# Patient Record
Sex: MALE | Race: WHITE | NOT HISPANIC OR LATINO | Employment: FULL TIME | URBAN - METROPOLITAN AREA
[De-identification: names, ages, dates, MRNs, and addresses within clinical notes are randomized per-mention and may not be internally consistent; named-entity substitution may affect disease eponyms.]

---

## 2021-03-29 ENCOUNTER — IMMUNIZATION (OUTPATIENT)
Dept: PRIMARY CARE CLINIC | Facility: CLINIC | Age: 51
End: 2021-03-29

## 2021-03-29 DIAGNOSIS — Z23 NEED FOR VACCINATION: Primary | ICD-10-CM

## 2021-03-29 PROCEDURE — 91301 PR SARS-COV-2 COVID-19 VACCINE, NO PRSV, 100MCG/0.5ML, IM: ICD-10-PCS | Mod: S$GLB,,, | Performed by: INTERNAL MEDICINE

## 2021-03-29 PROCEDURE — 91301 PR SARS-COV-2 COVID-19 VACCINE, NO PRSV, 100MCG/0.5ML, IM: CPT | Mod: S$GLB,,, | Performed by: INTERNAL MEDICINE

## 2021-03-29 PROCEDURE — 0011A PR IMMUNIZ ADMIN, SARS-COV-2 COVID-19 VACC, 100MCG/0.5ML, 1ST DOSE: CPT | Mod: CV19,S$GLB,, | Performed by: INTERNAL MEDICINE

## 2021-03-29 PROCEDURE — 0011A PR IMMUNIZ ADMIN, SARS-COV-2 COVID-19 VACC, 100MCG/0.5ML, 1ST DOSE: ICD-10-PCS | Mod: CV19,S$GLB,, | Performed by: INTERNAL MEDICINE

## 2021-03-29 RX ADMIN — Medication 0.5 ML: at 05:03

## 2021-04-28 ENCOUNTER — IMMUNIZATION (OUTPATIENT)
Dept: PRIMARY CARE CLINIC | Facility: CLINIC | Age: 51
End: 2021-04-28

## 2021-04-28 DIAGNOSIS — Z23 NEED FOR VACCINATION: Primary | ICD-10-CM

## 2021-04-28 PROCEDURE — 0012A PR IMMUNIZ ADMIN, SARS-COV-2 COVID-19 VACC, 100MCG/0.5ML, 2ND DOSE: ICD-10-PCS | Mod: CV19,S$GLB,, | Performed by: INTERNAL MEDICINE

## 2021-04-28 PROCEDURE — 91301 PR SARS-COV-2 COVID-19 VACCINE, NO PRSV, 100MCG/0.5ML, IM: CPT | Mod: S$GLB,,, | Performed by: INTERNAL MEDICINE

## 2021-04-28 PROCEDURE — 0012A PR IMMUNIZ ADMIN, SARS-COV-2 COVID-19 VACC, 100MCG/0.5ML, 2ND DOSE: CPT | Mod: CV19,S$GLB,, | Performed by: INTERNAL MEDICINE

## 2021-04-28 PROCEDURE — 91301 PR SARS-COV-2 COVID-19 VACCINE, NO PRSV, 100MCG/0.5ML, IM: ICD-10-PCS | Mod: S$GLB,,, | Performed by: INTERNAL MEDICINE

## 2021-04-28 RX ADMIN — Medication 0.5 ML: at 06:04

## 2022-02-28 ENCOUNTER — HOSPITAL ENCOUNTER (EMERGENCY)
Facility: HOSPITAL | Age: 52
Discharge: HOME | End: 2022-02-28
Attending: EMERGENCY MEDICINE
Payer: COMMERCIAL

## 2022-02-28 VITALS
TEMPERATURE: 98 F | RESPIRATION RATE: 18 BRPM | BODY MASS INDEX: 32.88 KG/M2 | HEIGHT: 76 IN | SYSTOLIC BLOOD PRESSURE: 140 MMHG | HEART RATE: 90 BPM | WEIGHT: 270 LBS | OXYGEN SATURATION: 98 % | DIASTOLIC BLOOD PRESSURE: 90 MMHG

## 2022-02-28 DIAGNOSIS — G89.29 CHRONIC LEFT-SIDED LOW BACK PAIN WITH LEFT-SIDED SCIATICA: Primary | ICD-10-CM

## 2022-02-28 DIAGNOSIS — M54.42 CHRONIC LEFT-SIDED LOW BACK PAIN WITH LEFT-SIDED SCIATICA: Primary | ICD-10-CM

## 2022-02-28 PROCEDURE — 25000003 PHARM REV CODE 250: Performed by: STUDENT IN AN ORGANIZED HEALTH CARE EDUCATION/TRAINING PROGRAM

## 2022-02-28 PROCEDURE — 99284 EMERGENCY DEPT VISIT MOD MDM: CPT | Mod: ,,, | Performed by: EMERGENCY MEDICINE

## 2022-02-28 PROCEDURE — 99283 EMERGENCY DEPT VISIT LOW MDM: CPT

## 2022-02-28 PROCEDURE — 99284 PR EMERGENCY DEPT VISIT,LEVEL IV: ICD-10-PCS | Mod: ,,, | Performed by: EMERGENCY MEDICINE

## 2022-02-28 RX ORDER — METHOCARBAMOL 500 MG/1
500 TABLET, FILM COATED ORAL
Status: COMPLETED | OUTPATIENT
Start: 2022-02-28 | End: 2022-02-28

## 2022-02-28 RX ORDER — MELOXICAM 7.5 MG/1
7.5 TABLET ORAL DAILY
Qty: 28 TABLET | Refills: 0 | Status: SHIPPED | OUTPATIENT
Start: 2022-02-28 | End: 2022-03-28

## 2022-02-28 RX ORDER — ACETAMINOPHEN 500 MG
1000 TABLET ORAL
Status: COMPLETED | OUTPATIENT
Start: 2022-02-28 | End: 2022-02-28

## 2022-02-28 RX ORDER — IBUPROFEN 400 MG/1
800 TABLET ORAL
Status: COMPLETED | OUTPATIENT
Start: 2022-02-28 | End: 2022-02-28

## 2022-02-28 RX ADMIN — ACETAMINOPHEN 1000 MG: 500 TABLET ORAL at 12:02

## 2022-02-28 RX ADMIN — IBUPROFEN 800 MG: 400 TABLET, FILM COATED ORAL at 12:02

## 2022-02-28 RX ADMIN — METHOCARBAMOL 500 MG: 500 TABLET ORAL at 12:02

## 2022-02-28 NOTE — ED NOTES
Patient identifiers verified and correct for this patient, ID bracelet placed on the L wrist.     Patient reports that he was at the chiropractor and stated that the pain was so bad that he could not do anything there so they called an ambulance to bring him here for the pain.  States that he is having pain from lower back around his buttocks, wrapping around and down the L leg.  States that he took tylenol for the pain at home.  Does report some numbness and tingling in the L leg.  Denies any falls.  Denies any loss of bowel or bladder control or difficulty with urination.     LOC: The patient is awake, alert and aware of environment with an appropriate affect, the patient is oriented x 3 and speaking appropriately.   APPEARANCE: Patient appears comfortable and in no acute distress, patient is clean and well groomed.  SKIN: The skin is warm and dry, color consistent with ethnicity, patient has normal skin turgor and moist mucus membranes, visible skin intact, no breakdown or bruising noted, denies any open wounds or sores.   MUSCULOSKELETAL: Patient moving all extremities spontaneously, no swelling noted, does report the pain in the L buttocks down the L leg as described above.  RESPIRATORY: Airway is open and patent, respirations are spontaneous, patient has a normal effort and rate, no accessory muscle use noted, denies any SOB.  CARDIAC: No edema noted, capillary refill < 3 seconds, denies any chest pain.   GASTRO: Soft and non tender to palpation, no distention noted, normoactive bowel sounds present in all four quadrants. Pt states bowel movements have been regular.  : Pt denies any pain or frequency with urination.  NEURO: Pt opens eyes spontaneously, behavior appropriate to situation, follows commands, facial expression symmetrical, purposeful motor response noted, normal sensation in all extremities when touched with a finger.    Awaiting MD evaluation and order placement.

## 2022-02-28 NOTE — ED PROVIDER NOTES
Encounter Date: 2/28/2022       History     Chief Complaint   Patient presents with    Back Pain     Arrived from chiropractor with L side sciatic nerve pain starting inlower back and buttock radiating down L leg     Patient is a 51-year-old male with no significant past medical history presenting to the emergency department for lower back pain x1 month.  He initially developed the pain when he had a stumble at work.  Did not fall, however he tested his body in a way that ended up causing a strain on his back; no actual/direct trauma to back. He has had associated although intermittent LLE shooting pains posteriorly with paresthesias.  He has not been taking any medications at home, however he did see a chiropractor for the pain.  States that it hurts him to walk, but is able to still do so.  He denies any midline back pain, urinary incontinence or saddle anesthesia.        Review of patient's allergies indicates:  No Known Allergies  History reviewed. No pertinent past medical history.  History reviewed. No pertinent surgical history.  No family history on file.     Review of Systems   Constitutional: Negative for fever.   HENT: Negative for sore throat.    Respiratory: Negative for shortness of breath.    Cardiovascular: Negative for chest pain.   Gastrointestinal: Negative for nausea.   Genitourinary: Negative for dysuria.   Musculoskeletal: Positive for back pain (lower; left sided with associated shooting pains down posterior L leg).   Skin: Negative for rash.   Neurological: Negative for weakness.   Hematological: Does not bruise/bleed easily.       Physical Exam     Initial Vitals [02/28/22 1106]   BP Pulse Resp Temp SpO2   (!) 140/90 90 18 98.1 °F (36.7 °C) 98 %      MAP       --         Physical Exam    Nursing note and vitals reviewed.  Constitutional: Vital signs are normal. He appears well-developed. He is not diaphoretic. No distress.   Overweight. Well-appearing.  Sitting in bed comfortably.  Speaking  full sentences.  No acute distress.   HENT:   Head: Normocephalic and atraumatic.   Right Ear: External ear normal.   Left Ear: External ear normal.   Neck: Neck supple.   Cardiovascular: Normal rate, regular rhythm, normal heart sounds and intact distal pulses.   Pulmonary/Chest: Breath sounds normal. No respiratory distress. He has no wheezes. He has no rhonchi. He has no rales.   Abdominal: Abdomen is soft. He exhibits no distension. There is no abdominal tenderness. There is no rebound and no guarding.   Musculoskeletal:      Cervical back: Neck supple.      Comments: There is no midline back pain. Pain is reproducible to L lumbar paraspinal musculature to the region of about L3-L4. Negative straight leg raise test.     Neurological: He is alert and oriented to person, place, and time. GCS score is 15. GCS eye subscore is 4. GCS verbal subscore is 5. GCS motor subscore is 6.   Strength and sensation intact to bilateral lower extremities.   Skin: Skin is warm. Capillary refill takes less than 2 seconds. No rash noted.   Psychiatric: He has a normal mood and affect.         ED Course   Procedures  Labs Reviewed - No data to display       Imaging Results    None          Medications   methocarbamoL tablet 500 mg (500 mg Oral Given 2/28/22 1201)   ibuprofen tablet 800 mg (800 mg Oral Given 2/28/22 1201)   acetaminophen tablet 1,000 mg (1,000 mg Oral Given 2/28/22 1201)     Medical Decision Making:   Initial Assessment:   Emergent evaluation of lower back pain that has been present x1 month.  He is afebrile and hemodynamically stable.  Differential Diagnosis:   Lumbar muscle strain, sciatica  ED Management:  Patient only has pain upon movement. Given ibuprofen, Tylenol and Robaxin.  Encouraged continued supportive care and management at home.  Discussed strict ED return precautions and back pain red flags, and patient expressed understanding.                      Clinical Impression:   Final diagnoses:  [M54.42,  G89.29] Chronic left-sided low back pain with left-sided sciatica (Primary)          ED Disposition Condition    Discharge Stable        ED Prescriptions     Medication Sig Dispense Start Date End Date Auth. Provider    meloxicam (MOBIC) 7.5 MG tablet Take 1 tablet (7.5 mg total) by mouth once daily. 28 tablet 2/28/2022 3/28/2022 Yared Duncan MD        Follow-up Information     Follow up With Specialties Details Why Contact Info    [facility] - Emergency Dept Emergency Medicine Go to  As needed, If symptoms worsen [address]  [facility] [id number] [phone]           Yared Duncan MD  Resident  02/28/22 9919

## 2022-02-28 NOTE — ED NOTES
Bed: [facility]  Expected date:   Expected time:   Means of arrival:   Comments:

## 2022-02-28 NOTE — DISCHARGE INSTRUCTIONS
Please return to the emergency department if you begin to experience symptoms including midline back pain, lower extremity weakness, inability to hold urine or numbness to the anal area

## 2022-02-28 NOTE — ED NOTES
Patient was able to get up off of the stretcher without any difficulty and with no assistance.  Stood in one attempt and stated that he felt much better.  He then ambulated out of the ED at this time without any difficulty accompanied by his mother.

## 2022-02-28 NOTE — ED NOTES
Patient medicated as ordered.  Currently without movement he denies any pain.  Visitor at bedside.  She had asked what the plan was for this patient and if we were going to do any imaging.  I had told her that they likely would not do imaging from the emergency department as the pain that he was describing was typically from sciatica.  Will continue to monitor.

## 2022-12-01 ENCOUNTER — HOSPITAL ENCOUNTER (EMERGENCY)
Facility: HOSPITAL | Age: 52
Discharge: HOME | End: 2022-12-01
Attending: EMERGENCY MEDICINE
Payer: COMMERCIAL

## 2022-12-01 VITALS
RESPIRATION RATE: 16 BRPM | TEMPERATURE: 98 F | HEART RATE: 80 BPM | OXYGEN SATURATION: 100 % | SYSTOLIC BLOOD PRESSURE: 139 MMHG | BODY MASS INDEX: 35.06 KG/M2 | WEIGHT: 288 LBS | DIASTOLIC BLOOD PRESSURE: 92 MMHG

## 2022-12-01 DIAGNOSIS — R03.0 ELEVATED BLOOD PRESSURE READING: ICD-10-CM

## 2022-12-01 DIAGNOSIS — R00.0 TACHYCARDIA: ICD-10-CM

## 2022-12-01 DIAGNOSIS — R31.9 HEMATURIA, UNSPECIFIED TYPE: ICD-10-CM

## 2022-12-01 DIAGNOSIS — M79.89 LEG SWELLING: ICD-10-CM

## 2022-12-01 DIAGNOSIS — E11.69 TYPE 2 DIABETES MELLITUS WITH OTHER SPECIFIED COMPLICATION, WITHOUT LONG-TERM CURRENT USE OF INSULIN: Primary | ICD-10-CM

## 2022-12-01 LAB
ALBUMIN SERPL BCP-MCNC: 4.2 G/DL (ref 3.5–5.2)
ALLENS TEST: ABNORMAL
ALP SERPL-CCNC: 101 U/L (ref 55–135)
ALT SERPL W/O P-5'-P-CCNC: 69 U/L (ref 10–44)
ANION GAP SERPL CALC-SCNC: 10 MMOL/L (ref 8–16)
AST SERPL-CCNC: 55 U/L (ref 10–40)
B-OH-BUTYR BLD STRIP-SCNC: 0.1 MMOL/L (ref 0–0.5)
BACTERIA #/AREA URNS AUTO: ABNORMAL /HPF
BASOPHILS # BLD AUTO: 0.05 K/UL (ref 0–0.2)
BASOPHILS NFR BLD: 0.7 % (ref 0–1.9)
BILIRUB SERPL-MCNC: 0.9 MG/DL (ref 0.1–1)
BILIRUB UR QL STRIP: NEGATIVE
BNP SERPL-MCNC: <10 PG/ML (ref 0–99)
BUN SERPL-MCNC: 10 MG/DL (ref 6–20)
CALCIUM SERPL-MCNC: 9.4 MG/DL (ref 8.7–10.5)
CHLORIDE SERPL-SCNC: 101 MMOL/L (ref 95–110)
CLARITY UR REFRACT.AUTO: CLEAR
CO2 SERPL-SCNC: 22 MMOL/L (ref 23–29)
COLOR UR AUTO: YELLOW
CREAT SERPL-MCNC: 1.1 MG/DL (ref 0.5–1.4)
DIFFERENTIAL METHOD: ABNORMAL
EOSINOPHIL # BLD AUTO: 0.1 K/UL (ref 0–0.5)
EOSINOPHIL NFR BLD: 1.5 % (ref 0–8)
ERYTHROCYTE [DISTWIDTH] IN BLOOD BY AUTOMATED COUNT: 12.7 % (ref 11.5–14.5)
EST. GFR  (NO RACE VARIABLE): >60 ML/MIN/1.73 M^2
GLUCOSE SERPL-MCNC: 402 MG/DL (ref 70–110)
GLUCOSE UR QL STRIP: ABNORMAL
HCO3 UR-SCNC: 25.4 MMOL/L (ref 24–28)
HCT VFR BLD AUTO: 45 % (ref 40–54)
HGB BLD-MCNC: 15.6 G/DL (ref 14–18)
HGB UR QL STRIP: ABNORMAL
IMM GRANULOCYTES # BLD AUTO: 0.07 K/UL (ref 0–0.04)
IMM GRANULOCYTES NFR BLD AUTO: 0.9 % (ref 0–0.5)
KETONES UR QL STRIP: NEGATIVE
LEUKOCYTE ESTERASE UR QL STRIP: NEGATIVE
LYMPHOCYTES # BLD AUTO: 2 K/UL (ref 1–4.8)
LYMPHOCYTES NFR BLD: 27.4 % (ref 18–48)
MCH RBC QN AUTO: 30.6 PG (ref 27–31)
MCHC RBC AUTO-ENTMCNC: 34.7 G/DL (ref 32–36)
MCV RBC AUTO: 88 FL (ref 82–98)
MICROSCOPIC COMMENT: ABNORMAL
MONOCYTES # BLD AUTO: 0.5 K/UL (ref 0.3–1)
MONOCYTES NFR BLD: 6.8 % (ref 4–15)
NEUTROPHILS # BLD AUTO: 4.7 K/UL (ref 1.8–7.7)
NEUTROPHILS NFR BLD: 62.7 % (ref 38–73)
NITRITE UR QL STRIP: NEGATIVE
NRBC BLD-RTO: 0 /100 WBC
PCO2 BLDA: 43.1 MMHG (ref 35–45)
PH SMN: 7.38 [PH] (ref 7.35–7.45)
PH UR STRIP: 5 [PH] (ref 5–8)
PLATELET # BLD AUTO: 151 K/UL (ref 150–450)
PMV BLD AUTO: 11.9 FL (ref 9.2–12.9)
PO2 BLDA: 65 MMHG (ref 40–60)
POC BE: 0 MMOL/L
POC SATURATED O2: 92 % (ref 95–100)
POC TCO2: 27 MMOL/L (ref 24–29)
POCT GLUCOSE: 313 MG/DL (ref 70–110)
POCT GLUCOSE: 359 MG/DL (ref 70–110)
POCT GLUCOSE: 404 MG/DL (ref 70–110)
POTASSIUM SERPL-SCNC: 4.1 MMOL/L (ref 3.5–5.1)
PROT SERPL-MCNC: 8.2 G/DL (ref 6–8.4)
PROT UR QL STRIP: NEGATIVE
RBC # BLD AUTO: 5.1 M/UL (ref 4.6–6.2)
RBC #/AREA URNS AUTO: 11 /HPF (ref 0–4)
SAMPLE: ABNORMAL
SITE: ABNORMAL
SODIUM SERPL-SCNC: 133 MMOL/L (ref 136–145)
SP GR UR STRIP: >1.03 (ref 1–1.03)
SQUAMOUS #/AREA URNS AUTO: 0 /HPF
URN SPEC COLLECT METH UR: ABNORMAL
WBC # BLD AUTO: 7.45 K/UL (ref 3.9–12.7)
WBC #/AREA URNS AUTO: 0 /HPF (ref 0–5)
YEAST UR QL AUTO: ABNORMAL

## 2022-12-01 PROCEDURE — 93005 ELECTROCARDIOGRAM TRACING: CPT

## 2022-12-01 PROCEDURE — 99284 PR EMERGENCY DEPT VISIT,LEVEL IV: ICD-10-PCS | Mod: ,,, | Performed by: PHYSICIAN ASSISTANT

## 2022-12-01 PROCEDURE — 80053 COMPREHEN METABOLIC PANEL: CPT | Performed by: PHYSICIAN ASSISTANT

## 2022-12-01 PROCEDURE — 83880 ASSAY OF NATRIURETIC PEPTIDE: CPT | Performed by: PHYSICIAN ASSISTANT

## 2022-12-01 PROCEDURE — 82962 GLUCOSE BLOOD TEST: CPT

## 2022-12-01 PROCEDURE — 99285 EMERGENCY DEPT VISIT HI MDM: CPT | Mod: 25

## 2022-12-01 PROCEDURE — 81001 URINALYSIS AUTO W/SCOPE: CPT | Performed by: PHYSICIAN ASSISTANT

## 2022-12-01 PROCEDURE — 93010 ELECTROCARDIOGRAM REPORT: CPT | Mod: ,,, | Performed by: INTERNAL MEDICINE

## 2022-12-01 PROCEDURE — 25000003 PHARM REV CODE 250: Performed by: PHYSICIAN ASSISTANT

## 2022-12-01 PROCEDURE — 99900035 HC TECH TIME PER 15 MIN (STAT)

## 2022-12-01 PROCEDURE — 82803 BLOOD GASES ANY COMBINATION: CPT

## 2022-12-01 PROCEDURE — 85025 COMPLETE CBC W/AUTO DIFF WBC: CPT | Performed by: PHYSICIAN ASSISTANT

## 2022-12-01 PROCEDURE — 96360 HYDRATION IV INFUSION INIT: CPT

## 2022-12-01 PROCEDURE — 82010 KETONE BODYS QUAN: CPT | Performed by: PHYSICIAN ASSISTANT

## 2022-12-01 PROCEDURE — 99284 EMERGENCY DEPT VISIT MOD MDM: CPT | Mod: ,,, | Performed by: PHYSICIAN ASSISTANT

## 2022-12-01 PROCEDURE — 93010 EKG 12-LEAD: ICD-10-PCS | Mod: ,,, | Performed by: INTERNAL MEDICINE

## 2022-12-01 RX ORDER — METFORMIN HYDROCHLORIDE 500 MG/1
500 TABLET ORAL
Qty: 30 TABLET | Refills: 0 | Status: SHIPPED | OUTPATIENT
Start: 2022-12-01 | End: 2022-12-27 | Stop reason: SDUPTHER

## 2022-12-01 RX ADMIN — SODIUM CHLORIDE 500 ML: 0.9 INJECTION, SOLUTION INTRAVENOUS at 07:12

## 2022-12-01 RX ADMIN — SODIUM CHLORIDE 500 ML: 9 INJECTION, SOLUTION INTRAVENOUS at 05:12

## 2022-12-02 NOTE — ED PROVIDER NOTES
"Encounter Date: 12/1/2022       History     Chief Complaint   Patient presents with    MD referral     Sent from [facility] for a "checkup" due to some concerns that came up when getting his new medical card.  "I feel fine"     Patient is a 52-year-old male with no significant medical history who presents to the emergency department after having a physical for work.  Patient reports he was having a work physical, when they told him his blood pressure and blood sugar were too high.  He denies any symptoms.  He states he does not see a primary care provider regularly.      The history is provided by the patient.   Review of patient's allergies indicates:  No Known Allergies  No past medical history on file.  No past surgical history on file.  No family history on file.     Review of Systems   Constitutional:  Negative for activity change, appetite change, chills, fatigue and fever.   HENT:  Negative for congestion, ear discharge, ear pain, postnasal drip, rhinorrhea, sore throat and trouble swallowing.    Respiratory:  Negative for cough and shortness of breath.    Cardiovascular:  Negative for chest pain.   Gastrointestinal:  Negative for abdominal pain, blood in stool, constipation, diarrhea, nausea and vomiting.   Genitourinary:  Negative for dysuria, flank pain and hematuria.   Musculoskeletal:  Negative for back pain, neck pain and neck stiffness.   Skin:  Negative for rash and wound.   Neurological:  Negative for dizziness, light-headedness and headaches.     Physical Exam     Initial Vitals [12/01/22 1602]   BP Pulse Resp Temp SpO2   (!) 168/72 100 20 98 °F (36.7 °C) 100 %      MAP       --         Physical Exam    Nursing note and vitals reviewed.  Constitutional: Vital signs are normal. He appears well-developed and well-nourished. He is not diaphoretic.  Non-toxic appearance. No distress.   HENT:   Head: Normocephalic.   Right Ear: External ear normal.   Left Ear: External ear normal.   Nose: Nose normal. "   Mouth/Throat: Oropharynx is clear and moist. No oropharyngeal exudate.   Eyes: Conjunctivae are normal. Pupils are equal, round, and reactive to light.   Neck:   Normal range of motion.  Cardiovascular:  Normal rate and regular rhythm.           Pulmonary/Chest: Breath sounds normal.   Abdominal: Abdomen is soft. Bowel sounds are normal. There is no abdominal tenderness.   Musculoskeletal:      Cervical back: Normal range of motion.     Neurological: He is alert and oriented to person, place, and time. He has normal strength. GCS score is 15. GCS eye subscore is 4. GCS verbal subscore is 5. GCS motor subscore is 6.   Skin: Skin is warm and dry. Capillary refill takes less than 2 seconds.   Psychiatric: He has a normal mood and affect.       ED Course   Procedures  Labs Reviewed   CBC W/ AUTO DIFFERENTIAL - Abnormal; Notable for the following components:       Result Value    Immature Granulocytes 0.9 (*)     Immature Grans (Abs) 0.07 (*)     All other components within normal limits   COMPREHENSIVE METABOLIC PANEL - Abnormal; Notable for the following components:    Sodium 133 (*)     CO2 22 (*)     Glucose 402 (*)     AST 55 (*)     ALT 69 (*)     All other components within normal limits   URINALYSIS, REFLEX TO URINE CULTURE - Abnormal; Notable for the following components:    Specific Gravity, UA >1.030 (*)     Glucose, UA 4+ (*)     Occult Blood UA Trace (*)     All other components within normal limits    Narrative:     Specimen Source->Urine   URINALYSIS MICROSCOPIC - Abnormal; Notable for the following components:    RBC, UA 11 (*)     All other components within normal limits    Narrative:     Specimen Source->Urine   POCT GLUCOSE - Abnormal; Notable for the following components:    POCT Glucose 404 (*)     All other components within normal limits   ISTAT PROCEDURE - Abnormal; Notable for the following components:    POC PO2 65 (*)     POC SATURATED O2 92 (*)     All other components within normal limits    POCT GLUCOSE - Abnormal; Notable for the following components:    POCT Glucose 359 (*)     All other components within normal limits   POCT GLUCOSE - Abnormal; Notable for the following components:    POCT Glucose 313 (*)     All other components within normal limits   B-TYPE NATRIURETIC PEPTIDE   BETA - HYDROXYBUTYRATE, SERUM     EKG Readings: (Independently Interpreted)   Initial Reading: No STEMI. Rhythm: Normal Sinus Rhythm.     Imaging Results              X-Ray Chest AP Portable (Final result)  Result time 12/01/22 18:28:05      Final result by Jackson Petit MD (12/01/22 18:28:05)                   Impression:      No acute process.      Electronically signed by: Jackson Petit MD  Date:    12/01/2022  Time:    18:28               Narrative:    EXAMINATION:  XR CHEST AP PORTABLE    CLINICAL HISTORY:  Other specified soft tissue disorders    TECHNIQUE:  Single frontal view of the chest was performed.    COMPARISON:  None    FINDINGS:  The trachea is unremarkable.  The cardiomediastinal silhouette is within normal limits.  The hilar structures are unremarkable.  There is no evidence of free air beneath the hemidiaphragms.  There are no pleural effusions.  There is no evidence of a pneumothorax.  There is no evidence of pneumomediastinum.  No airspace opacity is present.  The osseous structures are unremarkable.                                    X-Rays:   Independently Interpreted Readings:   Other Readings:  No acute cardiopulmonary process  Medications   sodium chloride 0.9% bolus 500 mL (0 mLs Intravenous Stopped 12/1/22 1800)   sodium chloride 0.9% bolus 500 mL (0 mLs Intravenous Stopped 12/1/22 2025)     Medical Decision Making:   Initial Assessment:   Urgent evaluation of a 52-year-old male with no significant medical history presents to the emergency department after an abnormal work physical.  Patient is afebrile, nontoxic appearing, and hemodynamically stable.  Patient is completely asymptomatic.   Patient's work forms show that his blood pressure was mildly elevated and has a glucose of greater than 500.  Extensive workup completed.  No significant kidney insufficiency or electrolyte disturbance.  No evidence to suggest DKA.  Patient is given IV fluid.  Glucose is trending downward.  He will be started on metformin.  Referral for Internal Medicine and diabetic training is placed.  Patient also has painless hematuria.  Urology referral is placed.  Patient is counseled on all findings.  He is advised to follow up with all physicians outpatient.  He is given strict return precautions.                        Clinical Impression:   Final diagnoses:  [R00.0] Tachycardia  [M79.89] Leg swelling  [E11.69] Type 2 diabetes mellitus with other specified complication, without long-term current use of insulin (Primary)  [R03.0] Elevated blood pressure reading  [R31.9] Hematuria, unspecified type        ED Disposition Condition    Discharge Stable          ED Prescriptions       Medication Sig Dispense Start Date End Date Auth. Provider    metFORMIN (GLUCOPHAGE) 500 MG tablet Take 1 tablet (500 mg total) by mouth daily with breakfast. 30 tablet 12/1/2022 12/1/2023 Kristie Crump PA-C          Follow-up Information       Follow up With Specialties Details Why Contact Info Additional Information    [facility]   [address]  [facility] [address] [phone] [facility] Please park in surface lot and check in at central registration desk    [facility] - [facility]rology [facility] Urology   [address]  [facility] [id number] [phone] Main Building, 4th Floor Please park in [facility] and take [facility] elevator             Kristie Crump PA-C  12/02/22 0048

## 2022-12-08 ENCOUNTER — LAB VISIT (OUTPATIENT)
Dept: LAB | Facility: HOSPITAL | Age: 52
End: 2022-12-08
Payer: COMMERCIAL

## 2022-12-08 ENCOUNTER — OFFICE VISIT (OUTPATIENT)
Dept: INTERNAL MEDICINE | Facility: CLINIC | Age: 52
End: 2022-12-08
Payer: COMMERCIAL

## 2022-12-08 VITALS
HEIGHT: 76 IN | OXYGEN SATURATION: 98 % | WEIGHT: 282.19 LBS | BODY MASS INDEX: 34.36 KG/M2 | HEART RATE: 82 BPM | DIASTOLIC BLOOD PRESSURE: 93 MMHG | SYSTOLIC BLOOD PRESSURE: 138 MMHG

## 2022-12-08 DIAGNOSIS — R03.0 ELEVATED BLOOD PRESSURE READING: ICD-10-CM

## 2022-12-08 DIAGNOSIS — Z00.00 ROUTINE HEALTH MAINTENANCE: ICD-10-CM

## 2022-12-08 DIAGNOSIS — E11.69 TYPE 2 DIABETES MELLITUS WITH OTHER SPECIFIED COMPLICATION, WITHOUT LONG-TERM CURRENT USE OF INSULIN: ICD-10-CM

## 2022-12-08 DIAGNOSIS — Z00.00 ROUTINE HEALTH MAINTENANCE: Primary | ICD-10-CM

## 2022-12-08 LAB
ALBUMIN SERPL BCP-MCNC: 3.9 G/DL (ref 3.5–5.2)
ALP SERPL-CCNC: 77 U/L (ref 55–135)
ALT SERPL W/O P-5'-P-CCNC: 56 U/L (ref 10–44)
ANION GAP SERPL CALC-SCNC: 8 MMOL/L (ref 8–16)
AST SERPL-CCNC: 53 U/L (ref 10–40)
BILIRUB SERPL-MCNC: 0.6 MG/DL (ref 0.1–1)
BILIRUB UR QL STRIP: NEGATIVE
BUN SERPL-MCNC: 8 MG/DL (ref 6–20)
CALCIUM SERPL-MCNC: 9.3 MG/DL (ref 8.7–10.5)
CHLORIDE SERPL-SCNC: 106 MMOL/L (ref 95–110)
CHOLEST SERPL-MCNC: 189 MG/DL (ref 120–199)
CHOLEST/HDLC SERPL: 5.3 {RATIO} (ref 2–5)
CLARITY UR REFRACT.AUTO: CLEAR
CO2 SERPL-SCNC: 24 MMOL/L (ref 23–29)
COLOR UR AUTO: YELLOW
CREAT SERPL-MCNC: 0.8 MG/DL (ref 0.5–1.4)
EST. GFR  (NO RACE VARIABLE): >60 ML/MIN/1.73 M^2
ESTIMATED AVG GLUCOSE: 306 MG/DL (ref 68–131)
GLUCOSE SERPL-MCNC: 143 MG/DL (ref 70–110)
GLUCOSE UR QL STRIP: ABNORMAL
HBA1C MFR BLD: 12.3 % (ref 4–5.6)
HDLC SERPL-MCNC: 36 MG/DL (ref 40–75)
HDLC SERPL: 19 % (ref 20–50)
HGB UR QL STRIP: NEGATIVE
KETONES UR QL STRIP: NEGATIVE
LDLC SERPL CALC-MCNC: 108.8 MG/DL (ref 63–159)
LEUKOCYTE ESTERASE UR QL STRIP: NEGATIVE
NITRITE UR QL STRIP: NEGATIVE
NONHDLC SERPL-MCNC: 153 MG/DL
PH UR STRIP: 5 [PH] (ref 5–8)
POTASSIUM SERPL-SCNC: 3.9 MMOL/L (ref 3.5–5.1)
PROT SERPL-MCNC: 7.4 G/DL (ref 6–8.4)
PROT UR QL STRIP: NEGATIVE
SODIUM SERPL-SCNC: 138 MMOL/L (ref 136–145)
SP GR UR STRIP: 1.02 (ref 1–1.03)
TRIGL SERPL-MCNC: 221 MG/DL (ref 30–150)
URN SPEC COLLECT METH UR: ABNORMAL

## 2022-12-08 PROCEDURE — 99396 PREV VISIT EST AGE 40-64: CPT | Mod: S$GLB,,,

## 2022-12-08 PROCEDURE — 3075F SYST BP GE 130 - 139MM HG: CPT | Mod: CPTII,S$GLB,,

## 2022-12-08 PROCEDURE — 36415 COLL VENOUS BLD VENIPUNCTURE: CPT

## 2022-12-08 PROCEDURE — 3080F PR MOST RECENT DIASTOLIC BLOOD PRESSURE >= 90 MM HG: ICD-10-PCS | Mod: CPTII,S$GLB,,

## 2022-12-08 PROCEDURE — 3008F PR BODY MASS INDEX (BMI) DOCUMENTED: ICD-10-PCS | Mod: CPTII,S$GLB,,

## 2022-12-08 PROCEDURE — 83036 HEMOGLOBIN GLYCOSYLATED A1C: CPT

## 2022-12-08 PROCEDURE — 3008F BODY MASS INDEX DOCD: CPT | Mod: CPTII,S$GLB,,

## 2022-12-08 PROCEDURE — 3046F HEMOGLOBIN A1C LEVEL >9.0%: CPT | Mod: CPTII,S$GLB,,

## 2022-12-08 PROCEDURE — 81003 URINALYSIS AUTO W/O SCOPE: CPT

## 2022-12-08 PROCEDURE — 3075F PR MOST RECENT SYSTOLIC BLOOD PRESS GE 130-139MM HG: ICD-10-PCS | Mod: CPTII,S$GLB,,

## 2022-12-08 PROCEDURE — 99999 PR PBB SHADOW E&M-EST. PATIENT-LVL IV: ICD-10-PCS | Mod: PBBFAC,,,

## 2022-12-08 PROCEDURE — 3046F PR MOST RECENT HEMOGLOBIN A1C LEVEL > 9.0%: ICD-10-PCS | Mod: CPTII,S$GLB,,

## 2022-12-08 PROCEDURE — 80053 COMPREHEN METABOLIC PANEL: CPT

## 2022-12-08 PROCEDURE — 80061 LIPID PANEL: CPT

## 2022-12-08 PROCEDURE — 3080F DIAST BP >= 90 MM HG: CPT | Mod: CPTII,S$GLB,,

## 2022-12-08 PROCEDURE — 99999 PR PBB SHADOW E&M-EST. PATIENT-LVL IV: CPT | Mod: PBBFAC,,,

## 2022-12-08 PROCEDURE — 99396 PR PREVENTIVE VISIT,EST,40-64: ICD-10-PCS | Mod: S$GLB,,,

## 2022-12-08 NOTE — PATIENT INSTRUCTIONS
Please obtain labs at your earliest convenience and continue to make lifestyle modifications.     Please follow up with your nutritionist and urologist

## 2022-12-08 NOTE — PROGRESS NOTES
Shlomo Wallace  1970        Subjective     Reason for Visit:    History of Present Illness:  Mr. Shlomo Wallace is a 52 y.o. male who presents to clinic for establishing of care.   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 In the ED visit, BG was 502. UA with trace hematuria. ED recommended starting on metformin, follow up with nutrition and urology.     His main concern at this visit is taking back control of his health and gettinghis diabetes under control.   ADLs: no limitations, 100% independent  Memory: No concerns  Mental health: PHQ-0   Safety: Lives at home with mother, no concerns for safety  Gait: wnl, no falls  Occupation: , odd hours   Diet: A lot of fried foods, gatorode, sweet foods, nutrition, motivat  Exercise: hard to do with work   Tobacco; no smoking  Alcohol; bud light social drinking, seldom (~1 every 1-2 mo)   Illicit Drug use: Denies   Sexual History: No partners in the last year,     HEALTH MAINTENANCE:  Colonoscopy; Has not had a colonoscopy yet  VACCINATIONS:    Flu; received flu shot  COVID: 5 shots     Review of Systems   Constitutional:  Negative for chills, fever, malaise/fatigue and weight loss.   HENT:  Negative for hearing loss and sore throat.    Eyes:  Negative for blurred vision.   Respiratory:  Negative for cough and wheezing.    Cardiovascular:  Negative for chest pain, palpitations, orthopnea and leg swelling.   Gastrointestinal:  Negative for abdominal pain, blood in stool, constipation, diarrhea, nausea and vomiting.   Genitourinary:  Negative for dysuria.   Musculoskeletal:  Negative for myalgias.   Neurological:  Negative for dizziness, tingling, weakness and headaches.   Endo/Heme/Allergies:  Does not bruise/bleed easily.   Psychiatric/Behavioral:  The patient is not nervous/anxious.       PAST HISTORY:     No past medical history on file.    No past surgical history on  "file.    No family history on file.    Social History     Socioeconomic History    Marital status: Single   Tobacco Use    Smoking status: Never    Smokeless tobacco: Never       MEDICATIONS & ALLERGIES:     Current Outpatient Medications on File Prior to Visit   Medication Sig    metFORMIN (GLUCOPHAGE) 500 MG tablet Take 1 tablet (500 mg total) by mouth daily with breakfast.     No current facility-administered medications on file prior to visit.       Review of patient's allergies indicates:  No Known Allergies    OBJECTIVE:        Vital Signs:  Vitals:    12/08/22 1522   BP: (!) 138/93   BP Location: Left arm   Patient Position: Sitting   BP Method: Large (Automatic)   Pulse: 82   SpO2: 98%   Weight: 128 kg (282 lb 3 oz)   Height: 6' 4" (1.93 m)       Body mass index is 34.35 kg/m².     Physical Exam:  Physical Exam  Vitals and nursing note reviewed.      Gen: AxOx3, well nourished, appears stated age, no pallor, no jaundice, appears well hydrated  Eye: EOMI, no scleral icterus, no periorbital edema or ecchymosis  Head: normocephalic, atraumatic, no lesions, scalp appears normal  ENT: neck supple, no stridor, no masses, no drooling or voice changes  CVS: All distal pulses intact with normal rate and rhythm, no JVD, normal S1/S2, no murmur  Pulm: Normal breath sounds, no wheezes, rales or rhonchi, no increased work of breathing  Abd: soft, nontender, nondistended, no organomegaly, no CVAT  Ext: no edema, no lesions, rashes, or deformity  Neuro: GCS15, moving all extremities, gait intact, face grossly symmetric  Psych: normal affect, cooperative, well groomed, makes good eye contact      Laboratory  Lab Results   Component Value Date    WBC 7.45 12/01/2022    HGB 15.6 12/01/2022    HCT 45.0 12/01/2022    MCV 88 12/01/2022     12/01/2022     Lab Results   Component Value Date     (H) 12/08/2022     12/08/2022    K 3.9 12/08/2022     12/08/2022    CO2 24 12/08/2022    BUN 8 12/08/2022    " CREATININE 0.8 12/08/2022    CALCIUM 9.3 12/08/2022    PROT 7.4 12/08/2022    BILITOT 0.6 12/08/2022    ALKPHOS 77 12/08/2022    ALT 56 (H) 12/08/2022    AST 53 (H) 12/08/2022     No results found for: INR, PROTIME  Lab Results   Component Value Date    CHOL 189 12/08/2022    HDL 36 (L) 12/08/2022    LDLCALC 108.8 12/08/2022    TRIG 221 (H) 12/08/2022     Lab Results   Component Value Date    HGBA1C 12.3 (H) 12/08/2022     No results found for: TSH  No results for input(s): POCTGLUCOSE in the last 72 hours.       Health Maintenance         Date Due Completion Date    Hepatitis C Screening Never done ---    Lipid Panel Never done ---    HIV Screening Never done ---    TETANUS VACCINE Never done ---    Colorectal Cancer Screening Never done ---    COVID-19 Vaccine (3 - Booster for Moderna series) 06/23/2021 4/28/2021                ASSESSMENT & PLAN:       Mr. Shlomo Wallace is a 52 y.o. male who was seen today in clinic for establishing of care after ED visit found BG of 402. He will follow up with me within 4 weeks.     Shlomo was seen today for diabetes.    Diagnoses and all orders for this visit:    Routine health maintenance  -     COMPREHENSIVE METABOLIC PANEL; Future  -     LIPID PANEL; Future  -     Urinalysis, Reflex to Urine Culture Urine, Clean Catch  -     Ambulatory referral/consult to Endo Procedure ; Future    Type 2 diabetes mellitus with other specified complication, without long-term current use of insulin  -     Ambulatory referral/consult to Internal Medicine  -     HEMOGLOBIN A1C; Future  -     Microalbumin/Creatinine Ratio, Urine; Future    Elevated blood pressure reading  -     Ambulatory referral/consult to Internal Medicine       1. Routine health maintenance    2. Type 2 diabetes mellitus with other specified complication, without long-term current use of insulin    3. Elevated blood pressure reading        Follow up in about 1 month (around 1/8/2023).    Other Orders Placed  This Visit:  Orders Placed This Encounter   Procedures    COMPREHENSIVE METABOLIC PANEL    HEMOGLOBIN A1C    LIPID PANEL    Urinalysis, Reflex to Urine Culture Urine, Clean Catch    Microalbumin/Creatinine Ratio, Urine    Ambulatory referral/consult to Endo Procedure                Discussed with Dr. Fajardo - staff attestation to follow    Cindy Castillo MD  Internal Medicine, PGY-1  12/09/2022.3:31 PM  [facility]  Internal Medicine Resident Clinic  [address] [place] [id number]  [phone]  [email or web address]

## 2022-12-09 NOTE — PROGRESS NOTES
Would usually recommend insulin with this A1c however his glucose on cmp was much better. Would do only small amount of basal if he is agreeable.

## 2022-12-12 ENCOUNTER — OFFICE VISIT (OUTPATIENT)
Dept: UROLOGY | Facility: CLINIC | Age: 52
End: 2022-12-12
Payer: COMMERCIAL

## 2022-12-12 ENCOUNTER — TELEPHONE (OUTPATIENT)
Dept: INTERNAL MEDICINE | Facility: CLINIC | Age: 52
End: 2022-12-12
Payer: COMMERCIAL

## 2022-12-12 ENCOUNTER — PATIENT MESSAGE (OUTPATIENT)
Dept: INTERNAL MEDICINE | Facility: CLINIC | Age: 52
End: 2022-12-12
Payer: COMMERCIAL

## 2022-12-12 ENCOUNTER — LAB VISIT (OUTPATIENT)
Dept: LAB | Facility: HOSPITAL | Age: 52
End: 2022-12-12
Payer: COMMERCIAL

## 2022-12-12 VITALS
HEART RATE: 87 BPM | DIASTOLIC BLOOD PRESSURE: 87 MMHG | HEIGHT: 76 IN | BODY MASS INDEX: 34.35 KG/M2 | SYSTOLIC BLOOD PRESSURE: 138 MMHG

## 2022-12-12 DIAGNOSIS — Z12.5 PROSTATE CANCER SCREENING: Primary | ICD-10-CM

## 2022-12-12 DIAGNOSIS — R31.9 HEMATURIA, UNSPECIFIED TYPE: ICD-10-CM

## 2022-12-12 DIAGNOSIS — E11.9 TYPE 2 DIABETES MELLITUS WITHOUT COMPLICATION, WITHOUT LONG-TERM CURRENT USE OF INSULIN: Primary | ICD-10-CM

## 2022-12-12 DIAGNOSIS — Z12.5 PROSTATE CANCER SCREENING: ICD-10-CM

## 2022-12-12 LAB
BILIRUB SERPL-MCNC: NORMAL MG/DL
BLOOD URINE, POC: NORMAL
CLARITY, POC UA: CLEAR
COLOR, POC UA: YELLOW
COMPLEXED PSA SERPL-MCNC: 0.25 NG/ML (ref 0–4)
GLUCOSE UR QL STRIP: NORMAL
KETONES UR QL STRIP: NORMAL
LEUKOCYTE ESTERASE URINE, POC: NORMAL
NITRITE, POC UA: NORMAL
PH, POC UA: 5
PROTEIN, POC: NORMAL
SPECIFIC GRAVITY, POC UA: 1.02
UROBILINOGEN, POC UA: NORMAL

## 2022-12-12 PROCEDURE — 88112 CYTOPATH CELL ENHANCE TECH: CPT | Mod: 26,,, | Performed by: PATHOLOGY

## 2022-12-12 PROCEDURE — 99204 PR OFFICE/OUTPT VISIT, NEW, LEVL IV, 45-59 MIN: ICD-10-PCS | Mod: S$GLB,,,

## 2022-12-12 PROCEDURE — 81002 POCT URINE DIPSTICK WITHOUT MICROSCOPE: ICD-10-PCS | Mod: S$GLB,,,

## 2022-12-12 PROCEDURE — 3075F SYST BP GE 130 - 139MM HG: CPT | Mod: CPTII,S$GLB,,

## 2022-12-12 PROCEDURE — 1160F RVW MEDS BY RX/DR IN RCRD: CPT | Mod: CPTII,S$GLB,,

## 2022-12-12 PROCEDURE — 1160F PR REVIEW ALL MEDS BY PRESCRIBER/CLIN PHARMACIST DOCUMENTED: ICD-10-PCS | Mod: CPTII,S$GLB,,

## 2022-12-12 PROCEDURE — 36415 COLL VENOUS BLD VENIPUNCTURE: CPT

## 2022-12-12 PROCEDURE — 3008F PR BODY MASS INDEX (BMI) DOCUMENTED: ICD-10-PCS | Mod: CPTII,S$GLB,,

## 2022-12-12 PROCEDURE — 88112 PR  CYTOPATH, CELL ENHANCE TECH: ICD-10-PCS | Mod: 26,,, | Performed by: PATHOLOGY

## 2022-12-12 PROCEDURE — 3008F BODY MASS INDEX DOCD: CPT | Mod: CPTII,S$GLB,,

## 2022-12-12 PROCEDURE — 99999 PR PBB SHADOW E&M-EST. PATIENT-LVL III: CPT | Mod: PBBFAC,,,

## 2022-12-12 PROCEDURE — 81001 URINALYSIS AUTO W/SCOPE: CPT

## 2022-12-12 PROCEDURE — 88112 CYTOPATH CELL ENHANCE TECH: CPT | Performed by: PATHOLOGY

## 2022-12-12 PROCEDURE — 84153 ASSAY OF PSA TOTAL: CPT

## 2022-12-12 PROCEDURE — 3079F DIAST BP 80-89 MM HG: CPT | Mod: CPTII,S$GLB,,

## 2022-12-12 PROCEDURE — 87086 URINE CULTURE/COLONY COUNT: CPT

## 2022-12-12 PROCEDURE — 3075F PR MOST RECENT SYSTOLIC BLOOD PRESS GE 130-139MM HG: ICD-10-PCS | Mod: CPTII,S$GLB,,

## 2022-12-12 PROCEDURE — 3046F PR MOST RECENT HEMOGLOBIN A1C LEVEL > 9.0%: ICD-10-PCS | Mod: CPTII,S$GLB,,

## 2022-12-12 PROCEDURE — 3046F HEMOGLOBIN A1C LEVEL >9.0%: CPT | Mod: CPTII,S$GLB,,

## 2022-12-12 PROCEDURE — 3079F PR MOST RECENT DIASTOLIC BLOOD PRESSURE 80-89 MM HG: ICD-10-PCS | Mod: CPTII,S$GLB,,

## 2022-12-12 PROCEDURE — 99204 OFFICE O/P NEW MOD 45 MIN: CPT | Mod: S$GLB,,,

## 2022-12-12 PROCEDURE — 1159F PR MEDICATION LIST DOCUMENTED IN MEDICAL RECORD: ICD-10-PCS | Mod: CPTII,S$GLB,,

## 2022-12-12 PROCEDURE — 81002 URINALYSIS NONAUTO W/O SCOPE: CPT | Mod: S$GLB,,,

## 2022-12-12 PROCEDURE — 99999 PR PBB SHADOW E&M-EST. PATIENT-LVL III: ICD-10-PCS | Mod: PBBFAC,,,

## 2022-12-12 PROCEDURE — 1159F MED LIST DOCD IN RCRD: CPT | Mod: CPTII,S$GLB,,

## 2022-12-12 NOTE — PROGRESS NOTES
CHIEF COMPLAINT:  Microscopic hematuria     HISTORY OF PRESENTING ILLINESS:  Shlomo Wallace is a 52 y.o. male new to urology. He is here today as a referral from JULIA Crump PA-C for microscopic hematuria. He had a microscopic urinalysis performed 12/1/22 11 rbc/hpf. He was seen for health physical through work - had high blood pressure and high blood sugar during exam and was sent to the ED for evaluation.     No family history of bladder, kidney, or bladder cancer.     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   HENT:  Negative for congestion and sore throat.    Respiratory:  Negative for cough, shortness of breath and wheezing.    Cardiovascular:  Negative for chest pain and palpitations.   Gastrointestinal:  Negative for nausea and vomiting.   Genitourinary:  Negative for dysuria, flank pain, frequency, hematuria and urgency.   Neurological:  Negative for dizziness and headaches.       PATIENT HISTORY:    No past medical history on file.    No past surgical history on file.    No family history on file.    Social History     Socioeconomic History    Marital status: Single   Tobacco Use    Smoking status: Never    Smokeless tobacco: Never       Allergies:  Patient has no known allergies.    Medications:    Current Outpatient Medications:     metFORMIN (GLUCOPHAGE) 500 MG tablet, Take 1 tablet (500 mg total) by mouth daily with breakfast., Disp: 30 tablet, Rfl: 0    PHYSICAL EXAMINATION:  Physical Exam  Constitutional:       Appearance: Normal appearance.   HENT:      Head: Normocephalic and atraumatic.      Right Ear: External ear normal.      Left Ear: External ear normal.   Pulmonary:      Effort: Pulmonary effort is  normal. No respiratory distress.   Genitourinary:     Comments: Declined KENAN - rationale provided   Skin:     General: Skin is warm and dry.   Neurological:      General: No focal deficit present.      Mental Status: He is alert and oriented to person, place, and time.   Psychiatric:         Mood and Affect: Mood normal.         Behavior: Behavior normal.       LABS:  UA dip in office normal      IMPRESSION:    Encounter Diagnoses   Name Primary?    Hematuria, unspecified type          Assessment:       1. Hematuria, unspecified type          Plan:   - repeat microscopic UA today - will call pt with results. If results show more than 3 rbc/hpf will proceed with hematuria workup   - PSA today     I explained to the patient that the causes of hematuria, whether it be gross hematuria or microhematuria, are many, including but not limited to  infections, kidney stones,  malignancy. Fortunately, for patients with microhematuria, the likelihood of finding an underlying  malignancy as the cause of the hematuria is very low at 1-2%. In patients with gross hematuria, the chances of an underlying  malignancy are higher but still low at 15-20%.     Nevertheless, I explained to the patient that the evaluation in both cases consists of upper tract imaging followed by flexible cystoscopy. I described the rationale and procedure for both and answered all questions.    Hematuria work up discussed in detail.  Will proceed with hematuria work up:  Creatinine prior to CT urogram to ensure adequate kidney function.   CT urogram   Cystoscopy  Urine cytology - sent  Urine culture - sent     I spent 45 minutes with the patient of which more than half was spent in direct consultation with the patient in regards to our treatment and plan.  We addressed the office findings and recent labs.   Education and recommendations of today's plan of care including home remedies and needed follow up with PCP.   We discussed the chief  complaint/LUTS and the possible contributory factors.   Recommended lifestyle modifications with proper, healthy diet, good hydration if no fluid restrictions; reducing bladder irritants.   Benefits of regular exercise approved by PCP.

## 2022-12-13 ENCOUNTER — PATIENT MESSAGE (OUTPATIENT)
Dept: INTERNAL MEDICINE | Facility: CLINIC | Age: 52
End: 2022-12-13
Payer: COMMERCIAL

## 2022-12-13 LAB
BACTERIA #/AREA URNS AUTO: ABNORMAL /HPF
BACTERIA UR CULT: NO GROWTH
MICROSCOPIC COMMENT: ABNORMAL
RBC #/AREA URNS AUTO: 2 /HPF (ref 0–4)
WBC #/AREA URNS AUTO: 1 /HPF (ref 0–5)

## 2022-12-13 RX ORDER — INSULIN PUMP SYRINGE, 3 ML
EACH MISCELLANEOUS
Qty: 1 EACH | Refills: 0 | Status: SHIPPED | OUTPATIENT
Start: 2022-12-13 | End: 2023-12-12

## 2022-12-13 RX ORDER — LANCETS
EACH MISCELLANEOUS
Qty: 100 EACH | Refills: 2 | Status: SHIPPED | OUTPATIENT
Start: 2022-12-13

## 2022-12-13 RX ORDER — INSULIN DETEMIR 100 [IU]/ML
15 INJECTION, SOLUTION SUBCUTANEOUS NIGHTLY
Qty: 6 EACH | Refills: 0 | Status: SHIPPED | OUTPATIENT
Start: 2022-12-13 | End: 2023-12-13

## 2022-12-13 RX ORDER — PEN NEEDLE, DIABETIC 30 GX3/16"
NEEDLE, DISPOSABLE MISCELLANEOUS
Qty: 100 EACH | Refills: 2 | Status: SHIPPED | OUTPATIENT
Start: 2022-12-13

## 2022-12-13 NOTE — TELEPHONE ENCOUNTER
Spoke with patient informing him of his HgA1c being 12.3 and the need for now starting insulin with nightly injection. Patient is agreeable and insulin 15U was sent to pharmacy of choice. Will follow up with patient in a month and additionally a referral to diabetes education was sent.

## 2022-12-14 ENCOUNTER — PATIENT MESSAGE (OUTPATIENT)
Dept: INTERNAL MEDICINE | Facility: CLINIC | Age: 52
End: 2022-12-14
Payer: COMMERCIAL

## 2022-12-14 NOTE — PROGRESS NOTES
I have reviewed the notes, assessments, and I concur with the plan and documentation. Discussed plan with patient.

## 2022-12-15 ENCOUNTER — PATIENT MESSAGE (OUTPATIENT)
Dept: DIABETES | Facility: CLINIC | Age: 52
End: 2022-12-15
Payer: COMMERCIAL

## 2022-12-15 ENCOUNTER — PATIENT MESSAGE (OUTPATIENT)
Dept: INTERNAL MEDICINE | Facility: CLINIC | Age: 52
End: 2022-12-15
Payer: COMMERCIAL

## 2022-12-15 NOTE — TELEPHONE ENCOUNTER
Patient's inquiry was regarding taking metformin and insulin, recommended that he continue with both and to follow up with me in clinic Jan 30- Feb 10. He also asked about his insulin- treated DM assessment form for work. Reported that at this time, I will speak to my senior attending, Dr Yuen and will have it ready for him by the end of business day tomorrow.

## 2022-12-16 ENCOUNTER — TELEPHONE (OUTPATIENT)
Dept: UROLOGY | Facility: CLINIC | Age: 52
End: 2022-12-16
Payer: COMMERCIAL

## 2022-12-16 LAB
FINAL PATHOLOGIC DIAGNOSIS: NORMAL
Lab: NORMAL

## 2022-12-16 NOTE — TELEPHONE ENCOUNTER
Spoke with patient about urine tests. Will schedule him for a f/u apt in 3 months for KENAN and micro UA.

## 2022-12-26 ENCOUNTER — PATIENT MESSAGE (OUTPATIENT)
Dept: INTERNAL MEDICINE | Facility: CLINIC | Age: 52
End: 2022-12-26
Payer: COMMERCIAL

## 2022-12-27 RX ORDER — METFORMIN HYDROCHLORIDE 500 MG/1
500 TABLET ORAL
Qty: 30 TABLET | Refills: 0 | OUTPATIENT
Start: 2022-12-27 | End: 2023-01-03 | Stop reason: SDUPTHER

## 2022-12-29 ENCOUNTER — CLINICAL SUPPORT (OUTPATIENT)
Dept: DIABETES | Facility: CLINIC | Age: 52
End: 2022-12-29
Payer: COMMERCIAL

## 2022-12-29 DIAGNOSIS — E11.69 TYPE 2 DIABETES MELLITUS WITH OTHER SPECIFIED COMPLICATION, WITHOUT LONG-TERM CURRENT USE OF INSULIN: ICD-10-CM

## 2022-12-29 PROCEDURE — G0108 PR DIAB MANAGE TRN  PER INDIV: ICD-10-PCS | Mod: S$GLB,,,

## 2022-12-29 PROCEDURE — 99999 PR PBB SHADOW E&M-EST. PATIENT-LVL I: ICD-10-PCS | Mod: PBBFAC,,,

## 2022-12-29 PROCEDURE — G0108 DIAB MANAGE TRN  PER INDIV: HCPCS | Mod: S$GLB,,,

## 2022-12-29 PROCEDURE — 99999 PR PBB SHADOW E&M-EST. PATIENT-LVL I: CPT | Mod: PBBFAC,,,

## 2022-12-30 NOTE — PROGRESS NOTES
Diabetes Care Specialist Progress Note  Author: Jasmine Lima RD  Date: 12/30/2022    Program Intake  Reason for Diabetes Program Visit:: Initial Diabetes Assessment  Current diabetes risk level:: high  In the last 12 months, have you:: used emergency room services  Was the ER or hospital admission related to diabetes?: Yes    Lab Results   Component Value Date    HGBA1C 12.3 (H) 12/08/2022     Clinical    Problem Review  Reviewed health maintenance: yes    Clinical Assessment  Current Diabetes Treatment: Oral Medication, Insulin  Have you ever experienced hypoglycemia (low blood sugar)?: no  Have you ever experienced hyperglycemia (high blood sugar)?: no    Medication Information  How many days a week do you miss your medications?: Never  Medication adherence impacting ability to self-manage diabetes?: No    Labs  Do you have regular lab work to monitor your medications?: Yes  Type of Regular Lab Work: A1c, Cholesterol, CBC  Lab Compliance Barriers: No    Nutritional Status  Diet: Regular  Meal Plan 24 Hour Recall: Breakfast, Lunch, Dinner  Meal Plan 24 Hour Recall - Breakfast: [place], banana  Meal Plan 24 Hour Recall - Lunch: Skips  Meal Plan 24 Hour Recall - Dinner: Split pea soup  Change in appetite?: No  Current nutritional status an area of need that is impacting patient's ability to self-manage diabetes?: No    Additional Social History    Support  Does anyone support you with your diabetes care?: yes  Who supports you?: self, parent  Who takes you to your medical appointments?: self  Does the current support meet the patient's needs?: Yes  Is Support an area impacting ability to self-manage diabetes?: No    Access to Mass Media & Technology  Does the patient have access to any of the following devices or technologies?: Smart phone  Media or technology needs impacting ability to self-manage diabetes?: No    Cognitive/Behavioral Health  Alert and Oriented: Yes  Difficulty Thinking: No  Requires Prompting:  No  Requires assistance for routine expression?: No  Cognitive or behavioral barriers impacting ability to self-manage diabetes?: No    Communication  Language preference: English  Hearing Problems: No  Vision Problems: No  Communication needs impacting ability to self-manage diabetes?: No    Health Literacy  Preferred Learning Method: Face to Face, Reading Materials  Health literacy needs impacting ability to self-manage diabetes?: No    Diabetes Self-Management Skills Assessment    Diabetes Disease Process/Treatment Options  Patient/caregiver knows what type of diabetes they have.: yes  Diabetes Type : Type II  Diabetes Disease Process/Treatment Options: Skills Assessment Completed: Yes  Assessment indicates:: Knowledge deficit  Area of need?: Yes    Nutrition/Healthy Eating  Challenges to healthy eating:: portion control  Method of carbohydrate measurement:: no method  Nutrition/Healthy Eating Skills Assessment Completed:: Yes  Assessment indicates:: Knowledge deficit, Instruction Needed  Area of need?: Yes    Physical Activity/Exercise  Patient formally exercises outside of work.: no  Patient can identify forms of physical activity.: yes  Stated forms of physical activity:: any movement performed by muscles that uses energy  Physical Activity/Exercise Skills Assessment Completed: : Yes  Assessment indicates:: Instruction Needed  Area of need?: Yes    Medications  Patient is able to describe current diabetes management routine.: yes  Diabetes management routine:: insulin, diet, oral medications  Patient is able to identify current diabetes medications, dosages, and appropriate timing of medications.: yes (Metformin, Levemir 15u)  Patient reports problems or concerns with current medication regimen.: no  Medication Skills Assessment Completed:: Yes  Assessment indicates:: Adequate understanding  Area of need?: No    Home Blood Glucose Monitoring  Patient states that blood sugar is checked at home daily.:  yes  Monitoring Method:: home glucometer  How often do you check your blood sugar?: Other (comment) (Once every other day 2/2 work schedule)  Blood glucose logs:: no, encouraged to keep logs  Blood glucose logs reviewed today?: no  Home Blood Glucose Monitoring Skills Assessment Completed: : Yes  Assessment indicates:: Instruction Needed  Area of need?: Yes    Acute Complications  Acute Complications Skills Assessment Completed: : No  Deffered due to:: Time    Chronic Complications  Patient is taking statin?: No  Chronic Complications Skills Assessment Completed: : No  Deferred due to:: Time    Psychosocial/Coping  Psychosocial/Coping Skills Assessment Completed: : No  Deffered due to:: Time    Diabetes Self Support Plan    Assessment Summary and Plan    Based on today's diabetes care assessment, the following areas of need were identified:      Social 12/29/2022   Support No   Access to Mass Media/Tech No   Cognitive/Behavioral Health No   Communication No   Health Literacy No        Clinical 12/29/2022   Medication Adherence No   Lab Compliance No   Nutritional Status No        Diabetes Self-Management Skills 12/29/2022   Diabetes Disease Process/Treatment Options Yes   Nutrition/Healthy Eating Yes, see care plan.   Physical Activity/Exercise Yes   Medication No   Home Blood Glucose Monitoring Yes, see care plan.     Today's interventions were provided through individual discussion, instruction, and written materials were provided.      Patient verbalized understanding of instruction and written materials.  Pt was able to return back demonstration of instructions today. Patient understood key points, needs reinforcement and further instruction.     Diabetes Self-Management Care Plan:    Today's Diabetes Self-Management Care Plan was developed with Shlomo's input. Shlomo has agreed to work toward the following goal(s) to improve his/her overall diabetes control.      Care Plan: Diabetes Management   Updates made  since 11/30/2022 12:00 AM        Problem: Healthy Eating         Goal: Eat 2-3 meals daily with 45-60g/3-4 servings of Carbohydrate per meal. Limit snacking in between meal to 1 serving (15 grams).    Start Date: 12/29/2022   Expected End Date: 3/30/2023   This Visit's Progress: Deferred   Priority: High   Barriers: No Barriers Identified   Note:    Reviewed meal planning and general nutritional counseling with regards to diabetes management. Meal habits reviewed. Reviewed basic Carbohydrate Counting principles--Food groups, label reading, use of dry measuring cups for accurate portion sizes       Task: Reviewed the sources and role of Carbohydrate, Protein, and Fat and how each nutrient impacts blood sugar. Completed 12/29/2022        Task: Provided visual examples using dry measuring cups, food models, and other familiar objects such as computer mouse, deck or cards, tennis ball etc. to help with visualization of portions. Completed 12/29/2022        Task: Explained how to count carbohydrates using the food label and the use of dry measuring cups for accurate carb counting. Completed 12/29/2022        Task: Discussed strategies for choosing healthier menu options when dining out. Completed 12/29/2022        Task: Review the importance of balancing carbohydrates with each meal using portion control techniques to count servings of carbohydrate and label reading to identify serving size and amount of total carbs per serving. Completed 12/29/2022        Problem: Blood Glucose Self-Monitoring         Goal: Patient agrees to check and record blood sugars daily.    Start Date: 12/29/2022   Expected End Date: 3/30/2023   This Visit's Progress: Deferred   Priority: Medium   Barriers: No Barriers Identified   Note:    Pt stated currently checking BS every other day 2/2 work schedule. Encouraged pt to check BS per MD instructions.         Follow Up Plan     Follow up in about 3 months (around 3/29/2023) for 3-month  F/U.    Today's care plan and follow up schedule was discussed with patient.  Shlomo verbalized understanding of the care plan, goals, and agrees to follow up plan.        The patient was encouraged to communicate with his/her health care provider/physician and care team regarding his/her condition(s) and treatment.  I provided the patient with my contact information today and encouraged to contact me via phone or [facility] Patient Portal as needed.     Length of Visit   Total Time: 50 Minutes

## 2023-01-04 RX ORDER — METFORMIN HYDROCHLORIDE 500 MG/1
500 TABLET ORAL
Qty: 30 TABLET | Refills: 0 | Status: SHIPPED | OUTPATIENT
Start: 2023-01-04 | End: 2023-03-10 | Stop reason: SDUPTHER

## 2023-01-10 ENCOUNTER — PATIENT MESSAGE (OUTPATIENT)
Dept: INTERNAL MEDICINE | Facility: CLINIC | Age: 53
End: 2023-01-10
Payer: COMMERCIAL

## 2023-01-19 ENCOUNTER — TELEPHONE (OUTPATIENT)
Dept: INTERNAL MEDICINE | Facility: CLINIC | Age: 53
End: 2023-01-19
Payer: COMMERCIAL

## 2023-01-19 NOTE — TELEPHONE ENCOUNTER
----- Message from Gene Ramirez MA sent at 1/18/2023  1:01 PM CST -----  Contact: Gogo basurto/[facility] [phone]    ----- Message -----  From: Tamar Linn  Sent: 1/17/2023   3:33 PM CST  To: [facility] Internal Medicine Residents    Gogo is reaching out in regards to a recent claim pt put in. Please give her a call.  She is looking for Dr. Castillo.            Thank you

## 2023-01-19 NOTE — TELEPHONE ENCOUNTER
Was not seen on date recrds requested   Telephone and fax number given to release of medical records

## 2023-01-26 ENCOUNTER — CLINICAL SUPPORT (OUTPATIENT)
Dept: ENDOSCOPY | Facility: HOSPITAL | Age: 53
End: 2023-01-26
Payer: COMMERCIAL

## 2023-01-26 VITALS — HEIGHT: 76 IN | BODY MASS INDEX: 32.88 KG/M2 | WEIGHT: 270 LBS

## 2023-01-26 DIAGNOSIS — Z12.11 COLON CANCER SCREENING: Primary | ICD-10-CM

## 2023-01-26 DIAGNOSIS — Z00.00 ROUTINE HEALTH MAINTENANCE: ICD-10-CM

## 2023-01-26 RX ORDER — SODIUM, POTASSIUM,MAG SULFATES 17.5-3.13G
1 SOLUTION, RECONSTITUTED, ORAL ORAL DAILY
Qty: 1 KIT | Refills: 0 | Status: SHIPPED | OUTPATIENT
Start: 2023-01-26 | End: 2023-01-28

## 2023-01-30 ENCOUNTER — OFFICE VISIT (OUTPATIENT)
Dept: INTERNAL MEDICINE | Facility: CLINIC | Age: 53
End: 2023-01-30
Payer: COMMERCIAL

## 2023-01-30 ENCOUNTER — TELEPHONE (OUTPATIENT)
Dept: INTERNAL MEDICINE | Facility: CLINIC | Age: 53
End: 2023-01-30
Payer: COMMERCIAL

## 2023-01-30 VITALS
SYSTOLIC BLOOD PRESSURE: 130 MMHG | BODY MASS INDEX: 34.05 KG/M2 | WEIGHT: 279.75 LBS | DIASTOLIC BLOOD PRESSURE: 70 MMHG

## 2023-01-30 DIAGNOSIS — E11.69 TYPE 2 DIABETES MELLITUS WITH OTHER SPECIFIED COMPLICATION, WITHOUT LONG-TERM CURRENT USE OF INSULIN: Primary | ICD-10-CM

## 2023-01-30 PROCEDURE — 3078F DIAST BP <80 MM HG: CPT | Mod: CPTII,S$GLB,,

## 2023-01-30 PROCEDURE — 99213 OFFICE O/P EST LOW 20 MIN: CPT | Mod: S$GLB,,,

## 2023-01-30 PROCEDURE — 3078F PR MOST RECENT DIASTOLIC BLOOD PRESSURE < 80 MM HG: ICD-10-PCS | Mod: CPTII,S$GLB,,

## 2023-01-30 PROCEDURE — 99999 PR PBB SHADOW E&M-EST. PATIENT-LVL III: CPT | Mod: PBBFAC,,,

## 2023-01-30 PROCEDURE — 3075F PR MOST RECENT SYSTOLIC BLOOD PRESS GE 130-139MM HG: ICD-10-PCS | Mod: CPTII,S$GLB,,

## 2023-01-30 PROCEDURE — 3008F BODY MASS INDEX DOCD: CPT | Mod: CPTII,S$GLB,,

## 2023-01-30 PROCEDURE — 3075F SYST BP GE 130 - 139MM HG: CPT | Mod: CPTII,S$GLB,,

## 2023-01-30 PROCEDURE — 99999 PR PBB SHADOW E&M-EST. PATIENT-LVL III: ICD-10-PCS | Mod: PBBFAC,,,

## 2023-01-30 PROCEDURE — 99213 PR OFFICE/OUTPT VISIT, EST, LEVL III, 20-29 MIN: ICD-10-PCS | Mod: S$GLB,,,

## 2023-01-30 PROCEDURE — 3008F PR BODY MASS INDEX (BMI) DOCUMENTED: ICD-10-PCS | Mod: CPTII,S$GLB,,

## 2023-01-30 RX ORDER — ATORVASTATIN CALCIUM 10 MG/1
10 TABLET, FILM COATED ORAL DAILY
Qty: 90 TABLET | Refills: 3 | Status: SHIPPED | OUTPATIENT
Start: 2023-01-30 | End: 2024-01-30

## 2023-01-30 RX ORDER — ATORVASTATIN CALCIUM 10 MG/1
10 TABLET, FILM COATED ORAL DAILY
Qty: 90 TABLET | Refills: 3 | Status: SHIPPED | OUTPATIENT
Start: 2023-01-30 | End: 2023-01-30

## 2023-01-30 NOTE — TELEPHONE ENCOUNTER
----- Message from Rashmi Alvarado sent at 1/30/2023  9:41 AM CST -----  Contact: Gogo [phone]  Gogo from [facility] requesting a call in regards to status of fax.    Please call and advise

## 2023-01-30 NOTE — PROGRESS NOTES
Shlomo Wallace  1970        Subjective     Reason for Visit:    History of Present Illness:  Mr. Shlomo Wallace is a 52 y.o. male who presents to clinic for a follow up visit after a recent diagnosis of Type 2 DM (now on insulin and metformin). Last seen on 12/08/2022.    Patient appeared in good spirits during visit. States he is taking his basal insulin (15U) at night time, occasionally will miss a dose. States his mother assists him in taking it. Reports adherence with blood sugar checks, states night time was 170 and morning 190. He reports his diet has improved, he supplements sugar cravings with yoghurt. States he is exercising, with 3 hours of basketball. Will incorporate 30 min of exercise.     Followed up with urology, cystoscopy was negative. Has a colonoscopy scheduled on 02/24/23.    He denies any changes in vision, lightheadedness, dizziness, numbness or tingling in extremities.       HPI         Review of Systems   Constitutional:  Positive for weight loss (intentional). Negative for chills, fever and malaise/fatigue.   HENT:  Negative for hearing loss and sore throat.    Eyes:  Negative for blurred vision.   Respiratory:  Negative for cough and wheezing.    Cardiovascular:  Negative for chest pain, palpitations, orthopnea and leg swelling.   Gastrointestinal:  Negative for abdominal pain, blood in stool, constipation, diarrhea, nausea and vomiting.   Genitourinary:  Negative for dysuria.   Musculoskeletal:  Negative for myalgias.   Neurological:  Negative for dizziness, tingling, weakness and headaches.   Endo/Heme/Allergies:  Does not bruise/bleed easily.   Psychiatric/Behavioral:  The patient is not nervous/anxious.       PAST HISTORY:     Past Medical History:   Diagnosis Date    Diabetes mellitus, type 2        No past surgical history on file.    No family history on file.    Social History     Socioeconomic History    Marital status: Single   Tobacco Use    Smoking status: Never     "Smokeless tobacco: Never   Substance and Sexual Activity    Alcohol use: Yes     Comment: occasional    Drug use: Never       MEDICATIONS & ALLERGIES:     Current Outpatient Medications on File Prior to Visit   Medication Sig    blood sugar diagnostic Strp To check BG 3 times daily, to use with insurance preferred meter    blood-glucose meter kit To check BG 3 times daily with meals and dinner, to use with insurance preferred meter    insulin detemir U-100 (LEVEMIR FLEXTOUCH U-100 INSULN) 100 unit/mL (3 mL) InPn pen Inject 15 Units into the skin every evening.    lancets Misc To check BG 3 times daily with meals and dinner, to use with insurance preferred meter    metFORMIN (GLUCOPHAGE) 500 MG tablet Take 1 tablet (500 mg total) by mouth daily with breakfast.    pen needle, diabetic 32 gauge x 5/32" Ndle Use to give insulin daily     No current facility-administered medications on file prior to visit.       Review of patient's allergies indicates:  No Known Allergies    OBJECTIVE:        Vital Signs:  Vitals:    01/30/23 1410   BP: 130/70   Weight: 126.9 kg (279 lb 12.2 oz)       Body mass index is 34.05 kg/m².     Physical Exam:  Physical Exam  Vitals and nursing note reviewed.   Constitutional:       Appearance: Normal appearance. He is well-groomed and overweight.   HENT:      Head: Normocephalic and atraumatic.   Eyes:      Extraocular Movements: Extraocular movements intact.      Conjunctiva/sclera: Conjunctivae normal.   Cardiovascular:      Rate and Rhythm: Normal rate and regular rhythm.      Heart sounds: No murmur heard.  Pulmonary:      Effort: Pulmonary effort is normal.      Breath sounds: Normal breath sounds.   Abdominal:      Tenderness: There is no abdominal tenderness.   Musculoskeletal:      Cervical back: Normal range of motion.      Right lower leg: No edema.      Left lower leg: No edema.   Skin:     General: Skin is warm.   Neurological:      Mental Status: He is alert and oriented to person, " place, and time.   Psychiatric:         Behavior: Behavior is cooperative.          Laboratory  Lab Results   Component Value Date    WBC 7.45 12/01/2022    HGB 15.6 12/01/2022    HCT 45.0 12/01/2022    MCV 88 12/01/2022     12/01/2022     Lab Results   Component Value Date     (H) 12/08/2022     12/08/2022    K 3.9 12/08/2022     12/08/2022    CO2 24 12/08/2022    BUN 8 12/08/2022    CREATININE 0.8 12/08/2022    CALCIUM 9.3 12/08/2022    PROT 7.4 12/08/2022    BILITOT 0.6 12/08/2022    ALKPHOS 77 12/08/2022    ALT 56 (H) 12/08/2022    AST 53 (H) 12/08/2022     No results found for: INR, PROTIME  Lab Results   Component Value Date    CHOL 189 12/08/2022    HDL 36 (L) 12/08/2022    LDLCALC 108.8 12/08/2022    TRIG 221 (H) 12/08/2022     Lab Results   Component Value Date    HGBA1C 12.3 (H) 12/08/2022     No results found for: TSH  No results for input(s): POCTGLUCOSE in the last 72 hours.       Health Maintenance         Date Due Completion Date    Hepatitis C Screening Never done ---    Diabetes Urine Screening Never done ---    Pneumococcal Vaccines (Age 0-64) (1 - PCV) Never done ---    Foot Exam Never done ---    Eye Exam Never done ---    HIV Screening Never done ---    TETANUS VACCINE Never done ---    Low Dose Statin Never done ---    Colorectal Cancer Screening Never done ---    COVID-19 Vaccine (3 - Booster for Moderna series) 06/23/2021 4/28/2021    Hemoglobin A1c 03/08/2023 12/8/2022    Lipid Panel 12/08/2023 12/8/2022                ASSESSMENT & PLAN:       Mr. Shlomo Wallace is a 52 y.o. male who was seen today in clinic for follow up visit.     Diagnoses and all orders for this visit:    Type 2 diabetes mellitus with other specified complication, without long-term current use of insulin  - will have patient follow up in 2 months.   - In setting of occasional adherence with insulin, will request patient to send blood sugar readings every 2 weeks. (Meal time and night time).    - offered patient chepe or dexacom at this time, however patient deferred states he is comfortably with the finger stick checks  - Upon follow up visit, will repeat A1c, lipid panel  -  Additionally for primary prevention, will add:   -     atorvastatin (LIPITOR) 10 MG tablet; Take 1 tablet (10 mg total) by mouth once daily.         1. Type 2 diabetes mellitus with other specified complication, without long-term current use of insulin        Follow up in about 2 months (around 3/30/2023).    Other Orders Placed This Visit:  No orders of the defined types were placed in this encounter.              Discussed with Dr. Clarke - staff attestation to follow    Cindy Castillo MD  Internal Medicine, PGY-1  01/30/2023.1:21 PM  [facility]  Internal Medicine Resident Clinic  [address] [place] [id number]  [phone]  [email or web address]

## 2023-01-30 NOTE — PROGRESS NOTES
I have reviewed and concur with the resident's history, physical, assessment, and plan.  See my attestation.   Spontaneous, unlabored and symmetrical

## 2023-01-30 NOTE — TELEPHONE ENCOUNTER
Spoke w/ Modesta and notified her that I would fax over the appt notes from his visit on December 8. She understood.

## 2023-01-30 NOTE — PATIENT INSTRUCTIONS
Via patient portal, please send me your blood sugar reading (meal time and night time readings) every 2 weeks  Please ensure that you take 15U of basal insulin every night   Please ensure you take your statin every night as well.

## 2023-02-01 ENCOUNTER — PATIENT MESSAGE (OUTPATIENT)
Dept: INTERNAL MEDICINE | Facility: CLINIC | Age: 53
End: 2023-02-01
Payer: COMMERCIAL

## 2023-02-01 DIAGNOSIS — Z79.4 TYPE 2 DIABETES MELLITUS WITHOUT COMPLICATION, WITH LONG-TERM CURRENT USE OF INSULIN: Primary | ICD-10-CM

## 2023-02-01 DIAGNOSIS — E11.9 TYPE 2 DIABETES MELLITUS WITHOUT COMPLICATION, WITH LONG-TERM CURRENT USE OF INSULIN: Primary | ICD-10-CM

## 2023-02-02 ENCOUNTER — TELEPHONE (OUTPATIENT)
Dept: INTERNAL MEDICINE | Facility: CLINIC | Age: 53
End: 2023-02-02
Payer: COMMERCIAL

## 2023-02-02 RX ORDER — BLOOD-GLUCOSE SENSOR
EACH MISCELLANEOUS
Qty: 5 EACH | Refills: 3 | Status: SHIPPED | OUTPATIENT
Start: 2023-02-02

## 2023-02-02 RX ORDER — BLOOD-GLUCOSE TRANSMITTER
EACH MISCELLANEOUS
Qty: 1 EACH | Refills: 3 | Status: SHIPPED | OUTPATIENT
Start: 2023-02-02

## 2023-02-02 NOTE — TELEPHONE ENCOUNTER
----- Message from Julia Maya sent at 2/2/2023  9:13 AM CST -----  Contact: [phone] Gogo Lewis is calling to see the pt was actually can not work she states she saw the notes but nothing saying why he could not work please give return call

## 2023-02-10 ENCOUNTER — TELEPHONE (OUTPATIENT)
Dept: ENDOSCOPY | Facility: HOSPITAL | Age: 53
End: 2023-02-10
Payer: COMMERCIAL

## 2023-02-10 NOTE — TELEPHONE ENCOUNTER
----- Message from Angelika Saravia sent at 2/9/2023  1:22 PM CST -----  Regarding: Pt Adv  Contact: [phone]  Pt's mother calling in regards to procedure on 02/24. Pt's mother stated they need instructions on how to use the meds, and when to start meds.   Please call and adv @ [phone]

## 2023-02-22 ENCOUNTER — TELEPHONE (OUTPATIENT)
Dept: ENDOSCOPY | Facility: HOSPITAL | Age: 53
End: 2023-02-22
Payer: COMMERCIAL

## 2023-02-22 NOTE — TELEPHONE ENCOUNTER
----- Message from Bebe Moore sent at 2/22/2023  2:29 PM CST -----  Regarding: Arrival Time  Contact: [phone]  Pt is calling to get arrival time for procedure on 02/24/2023.  Please call.

## 2023-02-23 ENCOUNTER — TELEPHONE (OUTPATIENT)
Dept: ENDOSCOPY | Facility: HOSPITAL | Age: 53
End: 2023-02-23
Payer: COMMERCIAL

## 2023-02-23 NOTE — TELEPHONE ENCOUNTER
----- Message from Erum Rosado CMA sent at 2/23/2023  1:58 PM CST -----  Regarding: Prep time  Contact: [phone]  Kesha called to ask if the prep time changed ,since the surgery time changed on 2/24/23. Please call Kesha.    Thank you

## 2023-03-02 ENCOUNTER — PATIENT MESSAGE (OUTPATIENT)
Dept: RESEARCH | Facility: HOSPITAL | Age: 53
End: 2023-03-02
Payer: COMMERCIAL

## 2023-03-08 ENCOUNTER — TELEPHONE (OUTPATIENT)
Dept: ENDOSCOPY | Facility: HOSPITAL | Age: 53
End: 2023-03-08
Payer: COMMERCIAL

## 2023-03-10 ENCOUNTER — PATIENT MESSAGE (OUTPATIENT)
Dept: UROLOGY | Facility: CLINIC | Age: 53
End: 2023-03-10
Payer: COMMERCIAL

## 2023-05-05 ENCOUNTER — PATIENT OUTREACH (OUTPATIENT)
Dept: ADMINISTRATIVE | Facility: HOSPITAL | Age: 53
End: 2023-05-05
Payer: COMMERCIAL

## 2025-04-17 ENCOUNTER — ANESTHESIA (OUTPATIENT)
Dept: SURGERY | Facility: HOSPITAL | Age: 55
End: 2025-04-17
Payer: COMMERCIAL

## 2025-04-17 ENCOUNTER — ANESTHESIA EVENT (OUTPATIENT)
Dept: SURGERY | Facility: HOSPITAL | Age: 55
End: 2025-04-17
Payer: COMMERCIAL

## 2025-04-17 ENCOUNTER — HOSPITAL ENCOUNTER (OUTPATIENT)
Facility: HOSPITAL | Age: 55
Discharge: HOME HEALTH | End: 2025-04-20
Attending: EMERGENCY MEDICINE | Admitting: EMERGENCY MEDICINE
Payer: COMMERCIAL

## 2025-04-17 DIAGNOSIS — S82.141A CLOSED BICONDYLAR FRACTURE OF RIGHT TIBIAL PLATEAU: Primary | ICD-10-CM

## 2025-04-17 DIAGNOSIS — M25.569 KNEE PAIN: ICD-10-CM
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
## 2025-04-17 DIAGNOSIS — R07.9 CHEST PAIN: ICD-10-CM

## 2025-04-17 DIAGNOSIS — Z01.810 PREOP CARDIOVASCULAR EXAM: ICD-10-CM

## 2025-04-17 DIAGNOSIS — S82.141A CLOSED FRACTURE OF RIGHT TIBIAL PLATEAU, INITIAL ENCOUNTER: ICD-10-CM

## 2025-04-17 LAB
25(OH)D3+25(OH)D2 SERPL-MCNC: 26 NG/ML (ref 30–96)
ABORH RETYPE: NORMAL
ABSOLUTE EOSINOPHIL (OHS): 0.1 K/UL
ABSOLUTE MONOCYTE (OHS): 0.59 K/UL (ref 0.3–1)
ABSOLUTE NEUTROPHIL COUNT (OHS): 6.08 K/UL (ref 1.8–7.7)
ALBUMIN SERPL BCP-MCNC: 4.1 G/DL (ref 3.5–5.2)
ALP SERPL-CCNC: 73 UNIT/L (ref 40–150)
ALT SERPL W/O P-5'-P-CCNC: 47 UNIT/L (ref 10–44)
ANION GAP (OHS): 11 MMOL/L (ref 8–16)
APTT PPP: 22.4 SECONDS (ref 21–32)
AST SERPL-CCNC: 31 UNIT/L (ref 11–45)
BASOPHILS # BLD AUTO: 0.04 K/UL
BASOPHILS NFR BLD AUTO: 0.5 %
BILIRUB SERPL-MCNC: 0.9 MG/DL (ref 0.1–1)
BUN SERPL-MCNC: 11 MG/DL (ref 6–20)
CALCIUM SERPL-MCNC: 9.2 MG/DL (ref 8.7–10.5)
CHLORIDE SERPL-SCNC: 108 MMOL/L (ref 95–110)
CO2 SERPL-SCNC: 22 MMOL/L (ref 23–29)
CREAT SERPL-MCNC: 0.9 MG/DL (ref 0.5–1.4)
EAG (OHS): 171 MG/DL (ref 68–131)
ERYTHROCYTE [DISTWIDTH] IN BLOOD BY AUTOMATED COUNT: 13.2 % (ref 11.5–14.5)
GFR SERPLBLD CREATININE-BSD FMLA CKD-EPI: >60 ML/MIN/1.73/M2
GLUCOSE SERPL-MCNC: 169 MG/DL (ref 70–110)
HBA1C MFR BLD: 7.6 % (ref 4–5.6)
HCT VFR BLD AUTO: 41.2 % (ref 40–54)
HGB BLD-MCNC: 13.6 GM/DL (ref 14–18)
IMM GRANULOCYTES # BLD AUTO: 0.1 K/UL (ref 0–0.04)
IMM GRANULOCYTES NFR BLD AUTO: 1.2 % (ref 0–0.5)
INDIRECT COOMBS: NORMAL
INR PPP: 1.1 (ref 0.8–1.2)
LYMPHOCYTES # BLD AUTO: 1.49 K/UL (ref 1–4.8)
MAGNESIUM SERPL-MCNC: 1.7 MG/DL (ref 1.6–2.6)
MCH RBC QN AUTO: 28.3 PG (ref 27–31)
MCHC RBC AUTO-ENTMCNC: 33 G/DL (ref 32–36)
MCV RBC AUTO: 86 FL (ref 82–98)
NUCLEATED RBC (/100WBC) (OHS): 0 /100 WBC
OHS QRS DURATION: 116 MS
OHS QTC CALCULATION: 435 MS
PLATELET # BLD AUTO: 148 K/UL (ref 150–450)
PMV BLD AUTO: 10.6 FL (ref 9.2–12.9)
POTASSIUM SERPL-SCNC: 3.7 MMOL/L (ref 3.5–5.1)
PREALB SERPL-MCNC: 26 MG/DL (ref 20–43)
PROT SERPL-MCNC: 7.4 GM/DL (ref 6–8.4)
PROTHROMBIN TIME: 12 SECONDS (ref 9–12.5)
RBC # BLD AUTO: 4.8 M/UL (ref 4.6–6.2)
RELATIVE EOSINOPHIL (OHS): 1.2 %
RELATIVE LYMPHOCYTE (OHS): 17.7 % (ref 18–48)
RELATIVE MONOCYTE (OHS): 7 % (ref 4–15)
RELATIVE NEUTROPHIL (OHS): 72.4 % (ref 38–73)
RH BLD: NORMAL
SODIUM SERPL-SCNC: 141 MMOL/L (ref 136–145)
SPECIMEN OUTDATE: NORMAL
TRANSFERRIN SERPL-MCNC: 231 MG/DL (ref 200–375)
WBC # BLD AUTO: 8.4 K/UL (ref 3.9–12.7)

## 2025-04-17 PROCEDURE — 85730 THROMBOPLASTIN TIME PARTIAL: CPT

## 2025-04-17 PROCEDURE — 63600175 PHARM REV CODE 636 W HCPCS

## 2025-04-17 PROCEDURE — 63600175 PHARM REV CODE 636 W HCPCS: Performed by: NURSE ANESTHETIST, CERTIFIED REGISTERED

## 2025-04-17 PROCEDURE — 94761 N-INVAS EAR/PLS OXIMETRY MLT: CPT

## 2025-04-17 PROCEDURE — 27532 TREAT KNEE FRACTURE: CPT | Mod: 51,RT,, | Performed by: ORTHOPAEDIC SURGERY

## 2025-04-17 PROCEDURE — 37000008 HC ANESTHESIA 1ST 15 MINUTES: Performed by: ORTHOPAEDIC SURGERY

## 2025-04-17 PROCEDURE — 36000708 HC OR TIME LEV III 1ST 15 MIN: Performed by: ORTHOPAEDIC SURGERY

## 2025-04-17 PROCEDURE — 84466 ASSAY OF TRANSFERRIN: CPT | Performed by: EMERGENCY MEDICINE

## 2025-04-17 PROCEDURE — 96374 THER/PROPH/DIAG INJ IV PUSH: CPT

## 2025-04-17 PROCEDURE — 25000003 PHARM REV CODE 250: Performed by: NURSE ANESTHETIST, CERTIFIED REGISTERED

## 2025-04-17 PROCEDURE — 25000003 PHARM REV CODE 250

## 2025-04-17 PROCEDURE — 63600175 PHARM REV CODE 636 W HCPCS: Performed by: ANESTHESIOLOGY

## 2025-04-17 PROCEDURE — 71000015 HC POSTOP RECOV 1ST HR: Performed by: ORTHOPAEDIC SURGERY

## 2025-04-17 PROCEDURE — 84134 ASSAY OF PREALBUMIN: CPT

## 2025-04-17 PROCEDURE — 36000709 HC OR TIME LEV III EA ADD 15 MIN: Performed by: ORTHOPAEDIC SURGERY

## 2025-04-17 PROCEDURE — 20690 APPL UNIPLN UNI EXT FIXJ SYS: CPT | Mod: RT,,, | Performed by: ORTHOPAEDIC SURGERY

## 2025-04-17 PROCEDURE — 99285 EMERGENCY DEPT VISIT HI MDM: CPT | Mod: 25

## 2025-04-17 PROCEDURE — 37000009 HC ANESTHESIA EA ADD 15 MINS: Performed by: ORTHOPAEDIC SURGERY

## 2025-04-17 PROCEDURE — C1713 ANCHOR/SCREW BN/BN,TIS/BN: HCPCS | Performed by: ORTHOPAEDIC SURGERY

## 2025-04-17 PROCEDURE — 27201423 OPTIME MED/SURG SUP & DEVICES STERILE SUPPLY: Performed by: ORTHOPAEDIC SURGERY

## 2025-04-17 PROCEDURE — 85610 PROTHROMBIN TIME: CPT

## 2025-04-17 PROCEDURE — 25000003 PHARM REV CODE 250: Performed by: STUDENT IN AN ORGANIZED HEALTH CARE EDUCATION/TRAINING PROGRAM

## 2025-04-17 PROCEDURE — 83735 ASSAY OF MAGNESIUM: CPT | Performed by: EMERGENCY MEDICINE

## 2025-04-17 PROCEDURE — 83036 HEMOGLOBIN GLYCOSYLATED A1C: CPT | Performed by: EMERGENCY MEDICINE

## 2025-04-17 PROCEDURE — 63600175 PHARM REV CODE 636 W HCPCS: Performed by: STUDENT IN AN ORGANIZED HEALTH CARE EDUCATION/TRAINING PROGRAM

## 2025-04-17 PROCEDURE — G0378 HOSPITAL OBSERVATION PER HR: HCPCS

## 2025-04-17 PROCEDURE — 96375 TX/PRO/DX INJ NEW DRUG ADDON: CPT

## 2025-04-17 PROCEDURE — 85025 COMPLETE CBC W/AUTO DIFF WBC: CPT

## 2025-04-17 PROCEDURE — 82040 ASSAY OF SERUM ALBUMIN: CPT

## 2025-04-17 PROCEDURE — 71000033 HC RECOVERY, INTIAL HOUR: Performed by: ORTHOPAEDIC SURGERY

## 2025-04-17 PROCEDURE — 96376 TX/PRO/DX INJ SAME DRUG ADON: CPT

## 2025-04-17 PROCEDURE — 99223 1ST HOSP IP/OBS HIGH 75: CPT | Mod: 57,,, | Performed by: ORTHOPAEDIC SURGERY

## 2025-04-17 PROCEDURE — 86901 BLOOD TYPING SEROLOGIC RH(D): CPT

## 2025-04-17 PROCEDURE — 82306 VITAMIN D 25 HYDROXY: CPT

## 2025-04-17 PROCEDURE — 93005 ELECTROCARDIOGRAM TRACING: CPT

## 2025-04-17 PROCEDURE — 93010 ELECTROCARDIOGRAM REPORT: CPT | Mod: ,,, | Performed by: INTERNAL MEDICINE

## 2025-04-17 DEVICE — CLAMP LG 4 POSITION MULTI-PIN: Type: IMPLANTABLE DEVICE | Site: LEG | Status: FUNCTIONAL

## 2025-04-17 DEVICE — IMPLANTABLE DEVICE: Type: IMPLANTABLE DEVICE | Site: LEG | Status: FUNCTIONAL

## 2025-04-17 DEVICE — FIXATION EXTERNAL ROD CARBON: Type: IMPLANTABLE DEVICE | Site: LEG | Status: FUNCTIONAL

## 2025-04-17 DEVICE — SCREW SCHANZ 5MMX200MM: Type: IMPLANTABLE DEVICE | Site: LEG | Status: FUNCTIONAL

## 2025-04-17 DEVICE — ROD CARBON FIBER 11MM X 300MM: Type: IMPLANTABLE DEVICE | Site: LEG | Status: FUNCTIONAL

## 2025-04-17 DEVICE — CLAMP COMBINATION / LG EXT FIX: Type: IMPLANTABLE DEVICE | Site: LEG | Status: FUNCTIONAL

## 2025-04-17 RX ORDER — ONDANSETRON HYDROCHLORIDE 2 MG/ML
4 INJECTION, SOLUTION INTRAVENOUS EVERY 6 HOURS PRN
Status: DISCONTINUED | OUTPATIENT
Start: 2025-04-17 | End: 2025-04-20 | Stop reason: HOSPADM

## 2025-04-17 RX ORDER — DEXMEDETOMIDINE HYDROCHLORIDE 100 UG/ML
INJECTION, SOLUTION INTRAVENOUS
Status: DISCONTINUED | OUTPATIENT
Start: 2025-04-17 | End: 2025-04-17

## 2025-04-17 RX ORDER — ONDANSETRON HYDROCHLORIDE 2 MG/ML
4 INJECTION, SOLUTION INTRAVENOUS ONCE AS NEEDED
Status: DISCONTINUED | OUTPATIENT
Start: 2025-04-17 | End: 2025-04-17 | Stop reason: HOSPADM

## 2025-04-17 RX ORDER — CEFAZOLIN 2 G/1
2 INJECTION, POWDER, FOR SOLUTION INTRAMUSCULAR; INTRAVENOUS
Status: COMPLETED | OUTPATIENT
Start: 2025-04-17 | End: 2025-04-17

## 2025-04-17 RX ORDER — KETAMINE HCL IN 0.9 % NACL 50 MG/5 ML
SYRINGE (ML) INTRAVENOUS
Status: DISCONTINUED | OUTPATIENT
Start: 2025-04-17 | End: 2025-04-17

## 2025-04-17 RX ORDER — CLINDAMYCIN PHOSPHATE 900 MG/50ML
900 INJECTION, SOLUTION INTRAVENOUS
Status: COMPLETED | OUTPATIENT
Start: 2025-04-17 | End: 2025-04-17

## 2025-04-17 RX ORDER — OXYCODONE HYDROCHLORIDE 10 MG/1
10 TABLET ORAL EVERY 6 HOURS PRN
Refills: 0 | Status: DISCONTINUED | OUTPATIENT
Start: 2025-04-17 | End: 2025-04-20 | Stop reason: HOSPADM

## 2025-04-17 RX ORDER — ACETAMINOPHEN 500 MG
1000 TABLET ORAL 3 TIMES DAILY
Status: DISCONTINUED | OUTPATIENT
Start: 2025-04-17 | End: 2025-04-20 | Stop reason: HOSPADM

## 2025-04-17 RX ORDER — CELECOXIB 200 MG/1
200 CAPSULE ORAL DAILY
Status: DISCONTINUED | OUTPATIENT
Start: 2025-04-18 | End: 2025-04-20 | Stop reason: HOSPADM

## 2025-04-17 RX ORDER — ROCURONIUM BROMIDE 10 MG/ML
INJECTION, SOLUTION INTRAVENOUS
Status: DISCONTINUED | OUTPATIENT
Start: 2025-04-17 | End: 2025-04-17

## 2025-04-17 RX ORDER — POLYETHYLENE GLYCOL 3350 17 G/17G
17 POWDER, FOR SOLUTION ORAL 2 TIMES DAILY PRN
Status: DISCONTINUED | OUTPATIENT
Start: 2025-04-17 | End: 2025-04-20 | Stop reason: HOSPADM

## 2025-04-17 RX ORDER — ONDANSETRON HYDROCHLORIDE 2 MG/ML
4 INJECTION, SOLUTION INTRAVENOUS DAILY PRN
Status: DISCONTINUED | OUTPATIENT
Start: 2025-04-17 | End: 2025-04-17 | Stop reason: HOSPADM

## 2025-04-17 RX ORDER — MUPIROCIN 20 MG/G
OINTMENT TOPICAL
Status: DISCONTINUED | OUTPATIENT
Start: 2025-04-17 | End: 2025-04-17 | Stop reason: HOSPADM

## 2025-04-17 RX ORDER — FENTANYL CITRATE 50 UG/ML
INJECTION, SOLUTION INTRAMUSCULAR; INTRAVENOUS
Status: DISCONTINUED | OUTPATIENT
Start: 2025-04-17 | End: 2025-04-17

## 2025-04-17 RX ORDER — HYDROMORPHONE HYDROCHLORIDE 2 MG/ML
0.5 INJECTION, SOLUTION INTRAMUSCULAR; INTRAVENOUS; SUBCUTANEOUS
Refills: 0 | Status: DISCONTINUED | OUTPATIENT
Start: 2025-04-17 | End: 2025-04-20 | Stop reason: HOSPADM

## 2025-04-17 RX ORDER — SODIUM CHLORIDE 0.9 % (FLUSH) 0.9 %
10 SYRINGE (ML) INJECTION EVERY 12 HOURS PRN
Status: DISCONTINUED | OUTPATIENT
Start: 2025-04-17 | End: 2025-04-20 | Stop reason: HOSPADM

## 2025-04-17 RX ORDER — MIDAZOLAM HYDROCHLORIDE 1 MG/ML
INJECTION INTRAMUSCULAR; INTRAVENOUS
Status: DISCONTINUED | OUTPATIENT
Start: 2025-04-17 | End: 2025-04-17

## 2025-04-17 RX ORDER — GLUCAGON 1 MG
1 KIT INJECTION
Status: DISCONTINUED | OUTPATIENT
Start: 2025-04-17 | End: 2025-04-20 | Stop reason: HOSPADM

## 2025-04-17 RX ORDER — IBUPROFEN 200 MG
24 TABLET ORAL
Status: DISCONTINUED | OUTPATIENT
Start: 2025-04-17 | End: 2025-04-20 | Stop reason: HOSPADM

## 2025-04-17 RX ORDER — PROPOFOL 10 MG/ML
VIAL (ML) INTRAVENOUS
Status: DISCONTINUED | OUTPATIENT
Start: 2025-04-17 | End: 2025-04-17

## 2025-04-17 RX ORDER — NALOXONE HCL 0.4 MG/ML
0.02 VIAL (ML) INJECTION
Status: DISCONTINUED | OUTPATIENT
Start: 2025-04-17 | End: 2025-04-20 | Stop reason: HOSPADM

## 2025-04-17 RX ORDER — SODIUM CHLORIDE 0.9 % (FLUSH) 0.9 %
10 SYRINGE (ML) INJECTION
Status: DISCONTINUED | OUTPATIENT
Start: 2025-04-17 | End: 2025-04-17 | Stop reason: HOSPADM

## 2025-04-17 RX ORDER — ASPIRIN 81 MG/1
81 TABLET ORAL 2 TIMES DAILY
Status: DISCONTINUED | OUTPATIENT
Start: 2025-04-17 | End: 2025-04-20 | Stop reason: HOSPADM

## 2025-04-17 RX ORDER — METHOCARBAMOL 500 MG/1
500 TABLET, FILM COATED ORAL 4 TIMES DAILY
Status: DISCONTINUED | OUTPATIENT
Start: 2025-04-17 | End: 2025-04-20 | Stop reason: HOSPADM

## 2025-04-17 RX ORDER — ACETAMINOPHEN 10 MG/ML
INJECTION, SOLUTION INTRAVENOUS
Status: DISCONTINUED | OUTPATIENT
Start: 2025-04-17 | End: 2025-04-17

## 2025-04-17 RX ORDER — LIDOCAINE HYDROCHLORIDE 20 MG/ML
INJECTION, SOLUTION EPIDURAL; INFILTRATION; INTRACAUDAL; PERINEURAL
Status: DISCONTINUED | OUTPATIENT
Start: 2025-04-17 | End: 2025-04-17

## 2025-04-17 RX ORDER — FENTANYL CITRATE 50 UG/ML
50 INJECTION, SOLUTION INTRAMUSCULAR; INTRAVENOUS
Refills: 0 | Status: COMPLETED | OUTPATIENT
Start: 2025-04-17 | End: 2025-04-17

## 2025-04-17 RX ORDER — AMOXICILLIN 250 MG
1 CAPSULE ORAL DAILY PRN
Status: DISCONTINUED | OUTPATIENT
Start: 2025-04-17 | End: 2025-04-20 | Stop reason: HOSPADM

## 2025-04-17 RX ORDER — PROCHLORPERAZINE EDISYLATE 5 MG/ML
2.5 INJECTION INTRAMUSCULAR; INTRAVENOUS EVERY 6 HOURS PRN
Status: DISCONTINUED | OUTPATIENT
Start: 2025-04-17 | End: 2025-04-20 | Stop reason: HOSPADM

## 2025-04-17 RX ORDER — IBUPROFEN 200 MG
16 TABLET ORAL
Status: DISCONTINUED | OUTPATIENT
Start: 2025-04-17 | End: 2025-04-20 | Stop reason: HOSPADM

## 2025-04-17 RX ORDER — HYDROMORPHONE HYDROCHLORIDE 1 MG/ML
0.2 INJECTION, SOLUTION INTRAMUSCULAR; INTRAVENOUS; SUBCUTANEOUS EVERY 5 MIN PRN
Status: DISCONTINUED | OUTPATIENT
Start: 2025-04-17 | End: 2025-04-17 | Stop reason: HOSPADM

## 2025-04-17 RX ORDER — PANTOPRAZOLE SODIUM 40 MG/10ML
40 INJECTION, POWDER, LYOPHILIZED, FOR SOLUTION INTRAVENOUS DAILY
Status: DISCONTINUED | OUTPATIENT
Start: 2025-04-18 | End: 2025-04-18

## 2025-04-17 RX ORDER — OXYCODONE HYDROCHLORIDE 5 MG/1
5 TABLET ORAL EVERY 6 HOURS PRN
Refills: 0 | Status: DISCONTINUED | OUTPATIENT
Start: 2025-04-17 | End: 2025-04-20 | Stop reason: HOSPADM

## 2025-04-17 RX ORDER — CEFAZOLIN 2 G/1
2 INJECTION, POWDER, FOR SOLUTION INTRAMUSCULAR; INTRAVENOUS
Status: COMPLETED | OUTPATIENT
Start: 2025-04-17 | End: 2025-04-18

## 2025-04-17 RX ORDER — FENTANYL CITRATE 50 UG/ML
25 INJECTION, SOLUTION INTRAMUSCULAR; INTRAVENOUS
Refills: 0 | Status: COMPLETED | OUTPATIENT
Start: 2025-04-17 | End: 2025-04-17

## 2025-04-17 RX ORDER — GABAPENTIN 300 MG/1
300 CAPSULE ORAL 3 TIMES DAILY
Status: DISCONTINUED | OUTPATIENT
Start: 2025-04-17 | End: 2025-04-20 | Stop reason: HOSPADM

## 2025-04-17 RX ORDER — MORPHINE SULFATE 4 MG/ML
4 INJECTION, SOLUTION INTRAMUSCULAR; INTRAVENOUS
Refills: 0 | Status: COMPLETED | OUTPATIENT
Start: 2025-04-17 | End: 2025-04-17

## 2025-04-17 RX ADMIN — FENTANYL CITRATE 100 MCG: 50 INJECTION INTRAMUSCULAR; INTRAVENOUS at 03:04

## 2025-04-17 RX ADMIN — FENTANYL CITRATE 25 MCG: 50 INJECTION INTRAMUSCULAR; INTRAVENOUS at 01:04

## 2025-04-17 RX ADMIN — CEFAZOLIN 2 G: 2 INJECTION, POWDER, FOR SOLUTION INTRAMUSCULAR; INTRAVENOUS at 10:04

## 2025-04-17 RX ADMIN — DEXMEDETOMIDINE 12 MCG: 100 INJECTION, SOLUTION, CONCENTRATE INTRAVENOUS at 04:04

## 2025-04-17 RX ADMIN — DEXMEDETOMIDINE 8 MCG: 100 INJECTION, SOLUTION, CONCENTRATE INTRAVENOUS at 04:04

## 2025-04-17 RX ADMIN — CEFAZOLIN 3 G: 2 INJECTION, POWDER, FOR SOLUTION INTRAMUSCULAR; INTRAVENOUS at 03:04

## 2025-04-17 RX ADMIN — ACETAMINOPHEN 1000 MG: 500 TABLET ORAL at 09:04

## 2025-04-17 RX ADMIN — MIDAZOLAM HYDROCHLORIDE 2 MG: 2 INJECTION, SOLUTION INTRAMUSCULAR; INTRAVENOUS at 03:04

## 2025-04-17 RX ADMIN — FENTANYL CITRATE 50 MCG: 50 INJECTION INTRAMUSCULAR; INTRAVENOUS at 12:04

## 2025-04-17 RX ADMIN — HYDROMORPHONE HYDROCHLORIDE 0.2 MG: 1 INJECTION, SOLUTION INTRAMUSCULAR; INTRAVENOUS; SUBCUTANEOUS at 05:04

## 2025-04-17 RX ADMIN — ACETAMINOPHEN 1000 MG: 10 INJECTION INTRAVENOUS at 04:04

## 2025-04-17 RX ADMIN — LIDOCAINE HYDROCHLORIDE 100 MG: 20 INJECTION, SOLUTION EPIDURAL; INFILTRATION; INTRACAUDAL at 03:04

## 2025-04-17 RX ADMIN — SUGAMMADEX 200 MG: 100 INJECTION, SOLUTION INTRAVENOUS at 04:04

## 2025-04-17 RX ADMIN — PROPOFOL 150 MG: 10 INJECTION, EMULSION INTRAVENOUS at 03:04

## 2025-04-17 RX ADMIN — SODIUM CHLORIDE: 9 INJECTION, SOLUTION INTRAVENOUS at 03:04

## 2025-04-17 RX ADMIN — ROCURONIUM BROMIDE 50 MG: 10 INJECTION INTRAVENOUS at 03:04

## 2025-04-17 RX ADMIN — GABAPENTIN 300 MG: 300 CAPSULE ORAL at 09:04

## 2025-04-17 RX ADMIN — MORPHINE SULFATE 4 MG: 4 INJECTION INTRAVENOUS at 02:04

## 2025-04-17 RX ADMIN — CLINDAMYCIN IN 5 PERCENT DEXTROSE 900 MG: 18 INJECTION, SOLUTION INTRAVENOUS at 03:04

## 2025-04-17 RX ADMIN — ASPIRIN 81 MG: 81 TABLET, COATED ORAL at 09:04

## 2025-04-17 RX ADMIN — Medication 30 MG: at 04:04

## 2025-04-17 RX ADMIN — METHOCARBAMOL 500 MG: 500 TABLET ORAL at 09:04

## 2025-04-17 NOTE — SUBJECTIVE & OBJECTIVE
"Past Medical History:   Diagnosis Date    Diabetes mellitus, type 2        History reviewed. No pertinent surgical history.    Review of patient's allergies indicates:  No Known Allergies    Current Facility-Administered Medications   Medication    ceFAZolin 2 g    clindamycin in D5W 900 mg/50 mL IVPB 900 mg    mupirocin 2 % ointment    sodium chloride 0.9% flush 10 mL     Family History    None       Tobacco Use    Smoking status: Never    Smokeless tobacco: Never   Substance and Sexual Activity    Alcohol use: Yes     Comment: occasional    Drug use: Never    Sexual activity: Not on file     ROS  Constitutional: negative for fevers or chills  Eyes: negative visual changes or eye discharge  ENT: negative for ear pain or sore throat  Respiratory: negative for shortness of breath or cough  Cardiovascular: negative for chest pain or palpitations  Gastrointestinal: negative for abdominal pain, nausea, or vomiting  Genitourinary: negative for dysuria and flank pain  Neurological: negative for headaches or dizziness  Musculoskeletal: positive for right knee pain and swelling.      Objective:     Vital Signs (Most Recent):  Temp: 97.5 °F (36.4 °C) (04/17/25 1525)  Pulse: 94 (04/17/25 1525)  Resp: 18 (04/17/25 1525)  BP: (!) 148/94 (04/17/25 1525)  SpO2: 97 % (04/17/25 1525) Vital Signs (24h Range):  Temp:  [97.5 °F (36.4 °C)-98.5 °F (36.9 °C)] 97.5 °F (36.4 °C)  Pulse:  [80-94] 94  Resp:  [14-20] 18  SpO2:  [95 %-99 %] 97 %  BP: (140-167)/() 148/94     Weight: 120.2 kg (265 lb)  Height: 6' 4" (193 cm)  Body mass index is 32.26 kg/m².    No intake or output data in the 24 hours ending 04/17/25 1537     Ortho/SPM Exam  General:  no acute distress, appears stated age   Neuro: alert and oriented x3  Psych: normal mood  Head: normocephalic, atraumatic.  Eyes: no scleral icterus  Mouth: moist mucous membranes  CV: extremities warm and well perfused  Pulm: breathing comfortably, equal chest rise bilat  Skin: clean, dry, " intact (any exceptions noted in below musculoskeletal exam)    MSK:    RUE:  - Skin intact throughout, no open wounds  - No swelling  - No ecchymosis, erythema, or signs of cellulitis  - NonTTP throughout  - AROM and PROM of the shoulder, elbow, wrist, and hand intact without pain  - Axillary/AIN/PIN/Radial/Median/Ulnar Nerves assessed in isolation without deficit  - SILT throughout  - Compartments soft  - Radial artery palpated   - Capillary Refill <3s    LUE:  - Skin intact throughout, no open wounds  - No swelling  - No ecchymosis, erythema, or signs of cellulitis  - NonTTP throughout  - AROM and PROM of the shoulder, elbow, wrist, and hand intact without pain  - Axillary/AIN/PIN/Radial/Median/Ulnar Nerves assessed in isolation without deficit  - SILT throughout  - Compartments soft  - Radial artery palpated   - Capillary Refill <3s    RLE:  - Skin intact throughout, no open wounds  - Moderate to severe swelling of the proximal tibia.  There is a significant knee joint effusion.  He has minimal swelling of the distal femur.  He has mild swelling of the distal aspect of the leg.  Over the fracture site, his compartments are swollen but remain compressible.  Distally, his compartments are soft and compressible.  - No ecchymosis, erythema, or signs of cellulitis  - TTP of the fracture site of the proximal tibia.  TTP of the lateral joint line of the knee.  - AROM and PROM of the ankle, and foot intact without pain  - No pain with passive stretch of the great toe.  - TA/EHL/Gastroc/FHL assessed in isolation without deficit  - SILT throughout  - DP and PT palpated  - Capillary Refill <3s  - Negative Log roll    LLE:  - Skin intact throughout, no open wounds  - No swelling  - No ecchymosis, erythema, or signs of cellulitis  - NonTTP throughout  - AROM and PROM of the hip, knee, ankle, and foot intact without pain  - TA/EHL/Gastroc/FHL assessed in isolation without deficit  - SILT throughout  - Compartments soft  - DP  and PT palpated  - Capillary Refill <3s  - Negative Log roll  - No pain with axial loading      Significant Labs:   Recent Lab Results         04/17/25  1434   04/17/25  1229   04/17/25  1221        Albumin   4.1         ALP   73         ALT   47         Anion Gap   11         PTT   22.4  Comment: Refer to local heparin nomogram for intensity/dose specific therapeutic range.         AST   31         Baso #   0.04         Basophil %   0.5         BILIRUBIN TOTAL   0.9  Comment: For infants and newborns, interpretation of results should be based   on gestational age, weight and in agreement with clinical   observations.    Premature Infant recommended reference ranges:   0-24 hours:  <8.0 mg/dL   24-48 hours: <12.0 mg/dL   3-5 days:    <15.0 mg/dL   6-29 days:   <15.0 mg/dL         BUN   11         Calcium   9.2         Chloride   108         CO2   22         Creatinine   0.9         eGFR   >60  Comment: Estimated GFR calculated using the CKD-EPI creatinine (2021) equation.         Eos #   0.10         Eos %   1.2         Glucose   169         Gran # (ANC)   6.08         Group & Rh   A POS         Hematocrit   41.2         Hemoglobin   13.6         Immature Grans (Abs)   0.10  Comment: Mild elevation in immature granulocytes is non specific and can be seen in a variety of conditions including stress response, acute inflammation, trauma and pregnancy. Correlation with other laboratory and clinical findings is essential.         Immature Granulocytes   1.2         INDIRECT LEIF   NEG         INR   1.1  Comment: Coumadin Therapy:    2.0 - 3.0 for INR for all indicators except mechanical heart valves    and antiphospholipid syndromes which should use 2.5 - 3.5.         Lymph #   1.49         Lymph %   17.7         Magnesium    1.7         MCH   28.3         MCHC   33.0         MCV   86         Mono #   0.59         Mono %   7.0         MPV   10.6         Neut %   72.4         nRBC   0         QRS Duration     116        OHS QTC Calculation     435       Platelet Count   148         Potassium   3.7         Prealbumin 26           PROTEIN TOTAL   7.4         PT   12.0         RBC   4.80         RDW   13.2         Sodium   141         Specimen Outdate   04/20/2025 23:59         Transferrin   231         WBC   8.40               All pertinent labs within the past 24 hours have been reviewed.    Significant Imaging: X-Ray: I have reviewed all pertinent results/findings and my personal findings are:  X-ray of the right knee demonstrates a bicondylar tibial plateau fracture

## 2025-04-17 NOTE — ANESTHESIA PROCEDURE NOTES
Intubation    Date/Time: 4/17/2025 3:49 PM    Performed by: Vijaya Snider CRNA  Authorized by: Tristan Garza MD    Intubation:     Induction:  Intravenous    Intubated:  Postinduction    Mask Ventilation:  Easy mask    Attempts:  1    Attempted By:  CRNA    Method of Intubation:  Video laryngoscopy    Blade:  Bell 3    Laryngeal View Grade: Grade I - full view of cords      Difficult Airway Encountered?: No      Complications:  None    Airway Device:  Oral endotracheal tube    Airway Device Size:  7.5    Style/Cuff Inflation:  Cuffed (inflated to minimal occlusive pressure)    Tube secured:  24    Secured at:  The lips    Placement Verified By:  Capnometry    Complicating Factors:  None    Findings Post-Intubation:  BS equal bilateral and atraumatic/condition of teeth unchanged

## 2025-04-17 NOTE — ED PROVIDER NOTES
Encounter Date: 4/17/2025       History     Chief Complaint   Patient presents with    Fall     Jumped out of bed of truck and injured right knee.      Shlomo Wallaec is a 54 y.o. male with PMHx of HLD, T2DM who presents for knee pain.  Patient states he was at work in the back of a pickup truck.  He hopped out of the bed of the truck and when he landed on the ground he felt his right leg collapse from under him.  He did not hit his head.  He did not have any preceding symptoms.  He has right knee pain and tenderness.  He does not have any numbness or tingling, able to move his right leg and foot.  He has had difficulty with ambulation and weight-bearing. He denies headache, confusion, neck pain, chest pain, abdominal pain, back pain.    The history is provided by the patient, a relative and medical records. No  was used.     Review of patient's allergies indicates:  No Known Allergies  Past Medical History:   Diagnosis Date    Diabetes mellitus, type 2      History reviewed. No pertinent surgical history.  No family history on file.  Social History[1]  Review of Systems   Respiratory:  Negative for shortness of breath.    Cardiovascular:  Positive for leg swelling. Negative for chest pain.   Genitourinary:  Negative for difficulty urinating and dysuria.   Musculoskeletal:  Positive for joint swelling.   Neurological:  Negative for dizziness, tremors, weakness, light-headedness, numbness and headaches.       Physical Exam     Initial Vitals [04/17/25 1115]   BP Pulse Resp Temp SpO2   (!) 152/97 82 16 98.5 °F (36.9 °C) 99 %      MAP       --         Physical Exam    Nursing note and vitals reviewed.  Constitutional: He appears well-developed and well-nourished. He is not diaphoretic. No distress.   HENT:   Head: Normocephalic and atraumatic.   Cardiovascular:  Normal rate, regular rhythm, normal heart sounds and intact distal pulses.           Pulmonary/Chest: Breath sounds normal. No  respiratory distress. He has no wheezes. He has no rales.   Abdominal: Abdomen is soft. Bowel sounds are normal. He exhibits no distension. There is no abdominal tenderness.   Musculoskeletal:      Comments: Right lower extremity with significant swelling about the knee and upper leg  Tender to palpation at knee, proximal tibia  No obvious bony deformity, no open wounds  Neurovascularly intact  No deformity seen about the right ankle or foot     Neurological: He is alert and oriented to person, place, and time. He has normal strength. No sensory deficit.   Neurovascularly intact  Compartments soft   Psychiatric: He has a normal mood and affect. Thought content normal.         ED Course   Procedures  Labs Reviewed   COMPREHENSIVE METABOLIC PANEL - Abnormal       Result Value    Sodium 141      Potassium 3.7      Chloride 108      CO2 22 (*)     Glucose 169 (*)     BUN 11      Creatinine 0.9      Calcium 9.2      Protein Total 7.4      Albumin 4.1      Bilirubin Total 0.9      ALP 73      AST 31      ALT 47 (*)     Anion Gap 11      eGFR >60     CBC WITH DIFFERENTIAL - Abnormal    WBC 8.40      RBC 4.80      HGB 13.6 (*)     HCT 41.2      MCV 86      MCH 28.3      MCHC 33.0      RDW 13.2      Platelet Count 148 (*)     MPV 10.6      Nucleated RBC 0      Neut % 72.4      Lymph % 17.7 (*)     Mono % 7.0      Eos % 1.2      Basophil % 0.5      Imm Grans % 1.2 (*)     Neut # 6.08      Lymph # 1.49      Mono # 0.59      Eos # 0.10      Baso # 0.04      Imm Grans # 0.10 (*)    APTT - Normal    PTT 22.4     PROTIME-INR - Normal    PT 12.0      INR 1.1     MAGNESIUM - Normal    Magnesium  1.7     TRANSFERRIN - Normal    Transferrin 231     CBC W/ AUTO DIFFERENTIAL    Narrative:     The following orders were created for panel order CBC auto differential.  Procedure                               Abnormality         Status                     ---------                               -----------         ------                      CBC with Differential[[id number]]       Abnormal            Final result                 Please view results for these tests on the individual orders.   EXTRA TUBES    Narrative:     The following orders were created for panel order EXTRA TUBES.  Procedure                               Abnormality         Status                     ---------                               -----------         ------                     Light Green Top Hold[[id number]]                                                       Lavender Top Hold[[id number]]                                                          Gold Top Hold[[id number]]                                                                Please view results for these tests on the individual orders.   LIGHT GREEN TOP HOLD   LAVENDER TOP HOLD   GOLD TOP HOLD   PREALBUMIN   VITAMIN D   HEMOGLOBIN A1C   TYPE & SCREEN    Specimen Outdate 04/20/2025 23:59      Group & Rh A POS      Indirect Parvin NEG     ABORH RETYPE     EKG Readings: (Independently Interpreted)   Initial Reading: No STEMI. Previous EKG: Compared with most recent EKG Previous EKG Date: 12/1/2022. Rhythm: Normal Sinus Rhythm. Heart Rate: 75. Clinical Impression: Normal Sinus Rhythm   IRBBB     ECG Results              EKG 12-lead (Final result)        Collection Time Result Time QRS Duration OHS QTC Calculation    04/17/25 12:21:07 04/17/25 12:35:10 116 435                     Final result by Interface, [facility] (04/17/25 12:35:16)                   Narrative:    Test Reason : W19.XXXA,    Vent. Rate :  75 BPM     Atrial Rate :  75 BPM     P-R Int : 186 ms          QRS Dur : 116 ms      QT Int : 390 ms       P-R-T Axes :  67  18  28 degrees    QTcB Int : 435 ms    Normal sinus rhythm  Incomplete right bundle branch block  Borderline Abnormal ECG  When compared with ECG of 01-Dec-2022 16:48,  No significant change was found  Confirmed by Arias Fernando ([id number]) on 4/17/2025 12:35:08 PM    Referred By: [email or web address]  SELF           Confirmed By: Arias Fernando                                  Imaging Results              X-Ray Chest 1 View Pre-OP (Final result)  Result time 04/17/25 14:50:20      Final result by Karan Diaz MD (04/17/25 14:50:20)                   Impression:      No acute radiographic findings in the chest on this single view.      Electronically signed by: Karan Diaz MD  Date:    04/17/2025  Time:    14:50               Narrative:    EXAMINATION:  XR CHEST 1 VIEW PRE-OP    CLINICAL HISTORY:  preop;    TECHNIQUE:  Single frontal view of the chest was performed.    COMPARISON:  Radiographs 12/01/2022.    FINDINGS:  Cardiac monitoring leads project over the bilateral hemithoraces.  Mediastinal structures are midline. Hilar contours are unremarkable.  Cardiac silhouette is normal in size. Lung volumes are low but symmetric.  No consolidation.  No pneumothorax or pleural effusion.  No free air beneath the diaphragm. No acute osseous abnormalities.                                       X-Ray Ankle Complete Right (Final result)  Result time 04/17/25 14:41:55      Final result by Alejandro Olivares III, MD (04/17/25 14:41:55)                   Narrative:    EXAMINATION:  XR ANKLE COMPLETE 3 VIEW RIGHT    CLINICAL HISTORY:  fx;    FINDINGS:  Ankle three views right.    Ankle mortise is intact.  No fracture dislocation bone destruction or OCD seen.      Electronically signed by: Alejandro Olivares MD  Date:    04/17/2025  Time:    14:41                                     X-Ray Tibia Fibula 2 View Right (Final result)  Result time 04/17/25 14:42:30      Final result by Alejandro Olivares III, MD (04/17/25 14:42:30)                   Impression:      Proximal tib fib fractures.      Electronically signed by: Alejandro Olivares MD  Date:    04/17/2025  Time:    14:42               Narrative:    EXAMINATION:  XR TIBIA FIBULA 2 VIEW RIGHT    CLINICAL HISTORY:  need an AP;    FINDINGS:  Tib fib two views right.    There  is a comminuted proximal tibial fracture involving the medial and lateral tibial plateau with depression of the lateral tibial plateau.  There is also a fibular fracture.  There are spurs on the calcaneus.                                       X-Ray Knee 3 View Right (Final result)  Result time 04/17/25 14:43:03      Final result by Alejandro Olivares III, MD (04/17/25 14:43:03)                   Impression:      Proximal tib fib fractures as above.      Electronically signed by: Alejandro Olivares MD  Date:    04/17/2025  Time:    14:43               Narrative:    EXAMINATION:  XR KNEE 3 VIEW RIGHT    CLINICAL HISTORY:  Proximal tibia fx;    FINDINGS:  There is a comminuted fracture of the proximal tibia and the proximal fibula with intra-articular extension and depression of the lateral tibial plateau.  There is a joint effusion.  There is DJD and spurs on the calcaneus and tibial tuberosity.                                       X-Ray Tibia Fibula 2 View Right (Final result)  Result time 04/17/25 13:57:46      Final result by Bakari Moreno MD (04/17/25 13:57:46)                   Impression:      See above      Electronically signed by: Bakari Moreno MD  Date:    04/17/2025  Time:    13:57               Narrative:    EXAMINATION:  XR TIBIA FIBULA 2 VIEW RIGHT    CLINICAL HISTORY:  Unspecified fall, initial encounter    TECHNIQUE:  AP and lateral views of the right tibia and fibula were performed.    COMPARISON:  None.    FINDINGS:  Partially visualized fracture of the proximal tibia.  No fracture or dislocation involving the distal tibia and fibula identified.                                       X-Ray Knee 3 View Right (Final result)  Result time 04/17/25 13:51:17      Final result by Alejandro Olivares III, MD (04/17/25 13:51:17)                   Narrative:    EXAMINATION:  XR KNEE 3 VIEW RIGHT    CLINICAL HISTORY:  Pain in unspecified knee    FINDINGS:  Knee two views right.    There is DJD and spurs on the  patella and tibial tuberosity.  There is a possible fracture of the proximal tibia.  This could also be artifact.  There has been trauma and there is knee pain a CT may be warranted.      Electronically signed by: Alejandro Olivares MD  Date:    04/17/2025  Time:    13:51                                     X-Ray Femur 2 AP/LAT Right (Final result)  Result time 04/17/25 13:51:38      Final result by Alejandro Olivares III, MD (04/17/25 13:51:38)                   Narrative:    EXAMINATION:  XR FEMUR 2 VIEW RIGHT    CLINICAL HISTORY:  Unspecified fall, initial encounter    FINDINGS:  Femur two views right.    There is DJD and impingement change of the hip.  No fracture dislocation bone destruction seen.  There are spurs on the patella.      Electronically signed by: Alejandro Olivares MD  Date:    04/17/2025  Time:    13:51                                     Medications   sodium chloride 0.9% flush 10 mL (has no administration in time range)   mupirocin 2 % ointment (has no administration in time range)   ceFAZolin 2 g (has no administration in time range)   clindamycin in D5W 900 mg/50 mL IVPB 900 mg (has no administration in time range)   fentaNYL 50 mcg/mL injection 50 mcg (50 mcg Intravenous Given 4/17/25 1235)   fentaNYL 50 mcg/mL injection 25 mcg (25 mcg Intravenous Given 4/17/25 1333)   morphine injection 4 mg (4 mg Intravenous Given 4/17/25 1445)     Medical Decision Making  54 y.o. male with PMHx of HLD, T2DM who presents for knee pain.  Mechanism of injury concerning for acute fracture versus dislocation versus ligament injury versus meniscal tear.  Do not suspect compartment syndrome at this time.  Neurovascularly intact distally.  2+ dorsalis pedis pulses. Imaging of the right lower extremity ordered.  Denies hitting head or other distracting injuries.  Denies chest pain shortness of breath abdominal pain, confusion, headache.    X-ray of tibia/fibula and right knee concerning for tibial fracture.  Orthopedic  surgery consulted and called.  Titrating pain control with fentanyl and morphine.  Orthopedic surgery planning to take to the operating room.  Admitted to obs, Dr. Marshal Manuel with orthopedic surgery.  Discussed with the patient, understanding and agreement.     Amount and/or Complexity of Data Reviewed  Labs: ordered. Decision-making details documented in ED Course.  Radiology: ordered and independent interpretation performed.  ECG/medicine tests: ordered and independent interpretation performed.    Risk  Prescription drug management.  Decision regarding hospitalization.              Attending Attestation:   Physician Attestation Statement for Resident:  As the supervising MD   Physician Attestation Statement: I have personally seen and examined this patient.   I agree with the above history.  -: 55 yo male presenting with right knee and leg pain.  Patient states he jumped off the back of a pick-up truck, approximately 3 feet, landing on both if his legs.  He states his right leg gave out.  Denies head injury, LOC, blood thinning medication.  Denies back pain.     As the supervising MD I agree with the above PE.   -: No e/o head injury  No C/T/L spinal or paraspinal TTP  Non-tender chest wall, non-tender abdomen, non-tender pelvis  RLE:  compartments soft, edema about proximal leg and knee, ROM limited 2/2 pain, no open wounds, 2+ DP, SILT  Non-tender foot, ankle.  TTP knee and proximal leg.    As the supervising MD I agree with the above treatment, course, plan, and disposition.   -: Fentanyl ordered for pain.   Xrays concerning for ' proximal tibia and the proximal fibula with intra-articular extension and depression of the lateral tibial plateau.'  Orthopedics consulted, appreciate evaluation of the patient in the ED, planning for OR.    I have reviewed and agree with the residents interpretation of the following: lab data, x-rays and EKG.  I have reviewed the following: old records at this facility.                 ED Course as of 04/17/25 1506   Thu Apr 17, 2025   1157 BP(!): 152/97 [AK]   1158 Temp: 98.5 °F (36.9 °C) [AK]   1158 Pulse: 82 [AK]   1158 SpO2: 99 % [AK]   1312 WBC: 8.40 [AK]   1312 Hemoglobin(!): 13.6 [AK]   1312 Platelet Count(!): 148 [AK]   1312 Comprehensive metabolic panel(!)  CO2 22, glucose 169, ALT 47, otherwise normal [AK]   1312 PT: 12.0 [AK]   1312 INR: 1.1 [AK]   1312 PTT: 22.4 [AK]   1328 BP(!): 167/105 [AK]   1328 Pulse: 80 [AK]   1328 SpO2: 95 % [AK]   1420 Creatinine: 0.9  Normal renal function [AB]   1420 WBC: 8.40  No leukocytosis [AB]      ED Course User Index  [AB] Migel Scott MD  [AK] Rodrigo Marr MD                           Clinical Impression:  Final diagnoses:  [W19.XXXA] Fall  [M25.569] Knee pain  [S82.141A] Closed fracture of right tibial plateau, initial encounter          ED Disposition Condition    Observation                   Rodrigo Marr MD  Resident  04/17/25 1507         [1]   Social History  Tobacco Use    Smoking status: Never    Smokeless tobacco: Never   Substance Use Topics    Alcohol use: Yes     Comment: occasional    Drug use: Never        Migel Scott MD  04/17/25 [id number]

## 2025-04-17 NOTE — ASSESSMENT & PLAN NOTE
Shlomo Wallace is a 54 y.o. male with no significant past medical history who presents with a right bicondylar tibial plateau fracture.  He is closed, neurovascularly intact.  On initial presentation, the patient does not have compartment syndrome; however, given his moderate to severe swelling, we will plan to proceed to the operating room emergently for stabilization with an external fixator.  He last ate at 4:00 a.m. this morning.    NWB RLE  NPO   Marked, booked, and consented  Proceed to the OR for ex fix versus ORIF, possible fasciotomies, and all other indicated procedures.     I had a thorough discussion with the patient regarding the nature of his injury and options for treatment, including both operative and nonoperative.  Given his significant swelling, I recommended emergent stabilization with an external fixator in order to mitigate his risk of continued swelling and the need for fasciotomies.  Risks of surgery include pain, bleeding, scarring, infection, pin site infection, compartment syndrome requiring fasciotomies, paralysis, loss of limb function, death.

## 2025-04-17 NOTE — CONSULTS
[facility] - Surgery ([facility])  Orthopedics  Consult Note    Patient Name: Shlomo Wallace  MRN: [id number]  Admission Date: 4/17/2025  Hospital Length of Stay: 0 days  Attending Provider: Marshal Manuel MD  Primary Care Provider: Cindy Castillo MD    Inpatient consult to Orthopedic Surgery  Consult performed by: ALEJANDRO Don MD  Consult ordered by: Rodrigo Marr MD        Subjective:     Principal Problem:Closed bicondylar fracture of right tibial plateau    Chief Complaint:   Chief Complaint   Patient presents with    Fall     Jumped out of bed of truck and injured right knee.         HPI: Shlomo Wallace is a 54 y.o. male with no significant past medical history presenting with right knee pain.  The patient reports that today, while at work, he fell 3 ft out of a truck and felt his right leg give way.  He had immediate onset of pain that has been unable to bear weight since the time of injury.  The injury occurred at approximately 10:30 a.m..  Patient denies any head trauma or LOC. The patient denies prior hx of falls. Patient denies numbness and tingling to the RLE. Denies any other musculoskeletal pain or injuries. No known history of prior right knee injury or surgery.    Ambulates without assistance@ baseline   Tobacco Use: Lifetime nonsmoker  Denies BOYD   Lives with his mother in [place], [place]. No stairs to enter the home.    Anticoagulation:  None at baseline  Immunosuppressive medications:  None at baseline  Occupation:  He works for [employer]  No hx of MI, CVA, DVT/PE  No hx of cancer, chemotherapy, or radiation      Past Medical History:   Diagnosis Date    Diabetes mellitus, type 2        History reviewed. No pertinent surgical history.    Review of patient's allergies indicates:  No Known Allergies    Current Facility-Administered Medications   Medication    ceFAZolin 2 g    clindamycin in D5W 900 mg/50 mL IVPB 900 mg    mupirocin 2 % ointment    sodium chloride 0.9% flush 10 mL     Family  "History    None       Tobacco Use    Smoking status: Never    Smokeless tobacco: Never   Substance and Sexual Activity    Alcohol use: Yes     Comment: occasional    Drug use: Never    Sexual activity: Not on file     ROS  Constitutional: negative for fevers or chills  Eyes: negative visual changes or eye discharge  ENT: negative for ear pain or sore throat  Respiratory: negative for shortness of breath or cough  Cardiovascular: negative for chest pain or palpitations  Gastrointestinal: negative for abdominal pain, nausea, or vomiting  Genitourinary: negative for dysuria and flank pain  Neurological: negative for headaches or dizziness  Musculoskeletal: positive for right knee pain and swelling.      Objective:     Vital Signs (Most Recent):  Temp: 97.5 °F (36.4 °C) (04/17/25 1525)  Pulse: 94 (04/17/25 1525)  Resp: 18 (04/17/25 1525)  BP: (!) 148/94 (04/17/25 1525)  SpO2: 97 % (04/17/25 1525) Vital Signs (24h Range):  Temp:  [97.5 °F (36.4 °C)-98.5 °F (36.9 °C)] 97.5 °F (36.4 °C)  Pulse:  [80-94] 94  Resp:  [14-20] 18  SpO2:  [95 %-99 %] 97 %  BP: (140-167)/() 148/94     Weight: 120.2 kg (265 lb)  Height: 6' 4" (193 cm)  Body mass index is 32.26 kg/m².    No intake or output data in the 24 hours ending 04/17/25 1537     Ortho/SPM Exam  General:  no acute distress, appears stated age   Neuro: alert and oriented x3  Psych: normal mood  Head: normocephalic, atraumatic.  Eyes: no scleral icterus  Mouth: moist mucous membranes  CV: extremities warm and well perfused  Pulm: breathing comfortably, equal chest rise bilat  Skin: clean, dry, intact (any exceptions noted in below musculoskeletal exam)    MSK:    RUE:  - Skin intact throughout, no open wounds  - No swelling  - No ecchymosis, erythema, or signs of cellulitis  - NonTTP throughout  - AROM and PROM of the shoulder, elbow, wrist, and hand intact without pain  - Axillary/AIN/PIN/Radial/Median/Ulnar Nerves assessed in isolation without deficit  - SILT " throughout  - Compartments soft  - Radial artery palpated   - Capillary Refill <3s    LUE:  - Skin intact throughout, no open wounds  - No swelling  - No ecchymosis, erythema, or signs of cellulitis  - NonTTP throughout  - AROM and PROM of the shoulder, elbow, wrist, and hand intact without pain  - Axillary/AIN/PIN/Radial/Median/Ulnar Nerves assessed in isolation without deficit  - SILT throughout  - Compartments soft  - Radial artery palpated   - Capillary Refill <3s    RLE:  - Skin intact throughout, no open wounds  - Moderate to severe swelling of the proximal tibia.  There is a significant knee joint effusion.  He has minimal swelling of the distal femur.  He has mild swelling of the distal aspect of the leg.  Over the fracture site, his compartments are swollen but remain compressible.  Distally, his compartments are soft and compressible.  - No ecchymosis, erythema, or signs of cellulitis  - TTP of the fracture site of the proximal tibia.  TTP of the lateral joint line of the knee.  - AROM and PROM of the ankle, and foot intact without pain  - No pain with passive stretch of the great toe.  - TA/EHL/Gastroc/FHL assessed in isolation without deficit  - SILT throughout  - DP and PT palpated  - Capillary Refill <3s  - Negative Log roll    LLE:  - Skin intact throughout, no open wounds  - No swelling  - No ecchymosis, erythema, or signs of cellulitis  - NonTTP throughout  - AROM and PROM of the hip, knee, ankle, and foot intact without pain  - TA/EHL/Gastroc/FHL assessed in isolation without deficit  - SILT throughout  - Compartments soft  - DP and PT palpated  - Capillary Refill <3s  - Negative Log roll  - No pain with axial loading      Significant Labs:   Recent Lab Results         04/17/25  1434   04/17/25  1229   04/17/25  1221        Albumin   4.1         ALP   73         ALT   47         Anion Gap   11         PTT   22.4  Comment: Refer to local heparin nomogram for intensity/dose specific therapeutic  range.         AST   31         Baso #   0.04         Basophil %   0.5         BILIRUBIN TOTAL   0.9  Comment: For infants and newborns, interpretation of results should be based   on gestational age, weight and in agreement with clinical   observations.    Premature Infant recommended reference ranges:   0-24 hours:  <8.0 mg/dL   24-48 hours: <12.0 mg/dL   3-5 days:    <15.0 mg/dL   6-29 days:   <15.0 mg/dL         BUN   11         Calcium   9.2         Chloride   108         CO2   22         Creatinine   0.9         eGFR   >60  Comment: Estimated GFR calculated using the CKD-EPI creatinine (2021) equation.         Eos #   0.10         Eos %   1.2         Glucose   169         Gran # (ANC)   6.08         Group & Rh   A POS         Hematocrit   41.2         Hemoglobin   13.6         Immature Grans (Abs)   0.10  Comment: Mild elevation in immature granulocytes is non specific and can be seen in a variety of conditions including stress response, acute inflammation, trauma and pregnancy. Correlation with other laboratory and clinical findings is essential.         Immature Granulocytes   1.2         INDIRECT LEIF   NEG         INR   1.1  Comment: Coumadin Therapy:    2.0 - 3.0 for INR for all indicators except mechanical heart valves    and antiphospholipid syndromes which should use 2.5 - 3.5.         Lymph #   1.49         Lymph %   17.7         Magnesium    1.7         MCH   28.3         MCHC   33.0         MCV   86         Mono #   0.59         Mono %   7.0         MPV   10.6         Neut %   72.4         nRBC   0         QRS Duration     116       OHS QTC Calculation     435       Platelet Count   148         Potassium   3.7         Prealbumin 26           PROTEIN TOTAL   7.4         PT   12.0         RBC   4.80         RDW   13.2         Sodium   141         Specimen Outdate   04/20/2025 23:59         Transferrin   231         WBC   8.40               All pertinent labs within the past 24 hours have been  reviewed.    Significant Imaging: X-Ray: I have reviewed all pertinent results/findings and my personal findings are:  X-ray of the right knee demonstrates a bicondylar tibial plateau fracture  Assessment/Plan:     * Closed bicondylar fracture of right tibial plateau  Shlomo Wallace is a 54 y.o. male with no significant past medical history who presents with a right bicondylar tibial plateau fracture.  He is closed, neurovascularly intact.  On initial presentation, the patient does not have compartment syndrome; however, given his moderate to severe swelling, we will plan to proceed to the operating room emergently for stabilization with an external fixator.  He last ate at 4:00 a.m. this morning.    ROSIO SUTHERLAND  NPO   Marked, booked, and consented  Proceed to the OR for ex fix versus ORIF, possible fasciotomies, and all other indicated procedures.     I had a thorough discussion with the patient regarding the nature of his injury and options for treatment, including both operative and nonoperative.  Given his significant swelling, I recommended emergent stabilization with an external fixator in order to mitigate his risk of continued swelling and the need for fasciotomies.  Risks of surgery include pain, bleeding, scarring, infection, pin site infection, compartment syndrome requiring fasciotomies, paralysis, loss of limb function, death.          CAROLINE Don MD  Orthopedics  [facility] - Surgery (2nd Fl)

## 2025-04-17 NOTE — PLAN OF CARE
Orthopedic surgery care update:        17:56  patient resting comfortably in PACU.  Good active and passive range of motion of the ankle and toes.  Leg screw and the compartments soft and compressible.  We will continue to monitor.    Marshal Manuel MD    Pt seen and examined at bedside in the PACU.  Reports that his pain is better than before surgery.    Leg is elevated on a stack of sheets with ice in place.  Knee spanning ex fix is in place.    Vitals:    04/17/25 1430 04/17/25 1445 04/17/25 1500 04/17/25 1525   BP: (!) 161/98 (!) 162/99 (!) 157/85 (!) 148/94   BP Location:       Pulse: 81 82 89 94   Resp: 20 15 16 18   Temp:    97.5 °F (36.4 °C)   TempSrc:    Oral   SpO2: 95% 95% 95% 97%   Weight:       Height:          Temp:  [97.5 °F (36.4 °C)-98.5 °F (36.9 °C)]      Pain: controlled  Pallor: none  Paraesthesias: none  Poikilothermia: none, extremity is WWP  Pulse:  2+ DP and PT pulse, cap refill < 2 seconds  Paralysis: none, ROM of the toes intact  Compartments: remains significantly swollen at the proximal tibia. Compressible to the medial and posterior tibia and remains compressible to the lateral compartment despite being significantly swollen. Distally, he is less swollen and remains soft and compressible.     ALEJANDRO Don MD  Orthopaedic Surgery   Resident Physician, PGY-2  04/17/2025

## 2025-04-17 NOTE — ANESTHESIA PREPROCEDURE EVALUATION
[facility]  Anesthesia Pre-Operative Evaluation     Patient Name: Shlomo Wallace  YOB: 1970  MRN: [id number]  [facility]: [id number]       Admit Date: 4/17/2025   Admit Team: Networked reference to record PCT   Hospital Day: 1  Date of Procedure: 4/17/2025  Anesthesia: General Procedure: Procedure(s) (LRB):  APPLICATION, EXTERNAL FIXATION DEVICE, LOWER EXTREMITY - RIGHT, Synthes, Slider, C-arm door side, ancef (Right)  Pre-Operative Diagnosis: Closed bicondylar fracture of right tibial plateau [S82.141A]  Proceduralist:Surgeons and Role:     * Marshal Manuel MD - Primary  Code Status: Full Code   Advanced Directive: <no information>  Isolation Precautions: No active isolations  Capacity: Full capacity       SUBJECTIVE:     Pre-operative evaluation for Procedure(s) (LRB):  APPLICATION, EXTERNAL FIXATION DEVICE, LOWER EXTREMITY - RIGHT, Synthes, Slider, C-arm door side, ancef (Right)  04/17/2025  Hospital LOS: 0 days  ICU LOS: Patient does not have an ICU stay during this admission.    Shlomo Wallace is a 54 y.o. male w/ a significant PMHx of HLD, T2DM who presents for knee pain.     he has a current medication list which includes the following long-term medication(s): atorvastatin, blood-glucose meter, dexcom g6 sensor, dexcom g6 transmitter, levemir flextouch u100 insulin, and metformin.     Patient now presents for the above procedure(s).    NPO Status: 330 AM sandwich    LDA:        Peripheral IV - Single Lumen 04/17/25 1232 20 G Left Antecubital (Active)        Peripheral IV - Single Lumen 04/17/25 1435 20 G Left Wrist (Active)     Vent/Oxygen: None documented  Drips: None documented.  Previous Airway: None documented.  Allergies:  Review of patient's allergies indicates:  No Known Allergies  Medications:     Current Outpatient Medications   Medication Instructions    atorvastatin (LIPITOR) 10 mg, Oral, Daily    blood sugar diagnostic Strp To check BG 3 times daily, to use  "with insurance preferred meter    blood-glucose meter kit To check BG 3 times daily with meals and dinner, to use with insurance preferred meter    blood-glucose sensor (DEXCOM G6 SENSOR) Sonya Use to check blood sugars twice daily    blood-glucose transmitter (DEXCOM G6 TRANSMITTER) Sonya Use to check blood sugar twice daily    lancets Misc To check BG 3 times daily with meals and dinner, to use with insurance preferred meter    LEVEMIR FLEXTOUCH U100 INSULIN 15 Units, Subcutaneous, Nightly    metFORMIN (GLUCOPHAGE) 500 mg, Oral, With breakfast    pen needle, diabetic 32 gauge x 5/32" Ndle Use to give insulin daily     Inpatient Medications:    History:   There are no hospital problems to display for this patient.    Medical History:  Past Medical History:   Diagnosis Date    Diabetes mellitus, type 2      Surgical History:    has no past surgical history on file.   Social History:    has no history on file for sexual activity.  reports that he has never smoked. He has never used smokeless tobacco. He reports current alcohol use. He reports that he does not use drugs.    OBJECTIVE:   Vital Signs Range (Last 24H):  Temp:  [36.9 °C (98.5 °F)]   Pulse:  [80-89]   Resp:  [14-16]   BP: (140-167)/()   SpO2:  [93 %-99 %]   Lab Results   Component Value Date    WBC 8.40 04/17/2025    HGB 13.6 (L) 04/17/2025    HCT 41.2 04/17/2025     (L) 04/17/2025     04/17/2025    K 3.7 04/17/2025     04/17/2025    CREATININE 0.9 04/17/2025    BUN 11 04/17/2025    CO2 22 (L) 04/17/2025     (H) 12/08/2022    CALCIUM 9.2 04/17/2025    MG 1.7 04/17/2025    ALKPHOS 73 04/17/2025    ALT 47 (H) 04/17/2025    AST 31 04/17/2025    ALBUMIN 4.1 04/17/2025    INR 1.1 04/17/2025    APTT 22.4 04/17/2025    HGBA1C 12.3 (H) 12/08/2022    BNP <10 12/01/2022     Recent Labs     04/17/25  1229   WBC 8.40   HGB 13.6*   HCT 41.2   *      K 3.7   CREATININE 0.9   INR 1.1     No results for input(s): "PH", "PCO2", " ""PO2", "HCO3", "POCSATURATED", "BE" in the last 72 hours.   No LMP for male patient.    Please see Results Review for additional labs & imaging.     EKG:   Results for orders placed or performed during the hospital encounter of 04/17/25   EKG 12-lead    Collection Time: 04/17/25 12:21 PM   Result Value Ref Range    QRS Duration 116 ms    OHS QTC Calculation 435 ms    Narrative    Test Reason : W19.XXXA,    Vent. Rate :  75 BPM     Atrial Rate :  75 BPM     P-R Int : 186 ms          QRS Dur : 116 ms      QT Int : 390 ms       P-R-T Axes :  67  18  28 degrees    QTcB Int : 435 ms    Normal sinus rhythm  Incomplete right bundle branch block  Borderline Abnormal ECG  When compared with ECG of 01-Dec-2022 16:48,  No significant change was found  Confirmed by Arias Fernnado ([id number]) on 4/17/2025 12:35:08 PM    Referred By: AAAREFERRAL SELF           Confirmed By: Arias Fernando     ECHO & Other Cardiac Studies:  See subjective, if available.  ASSESSMENT/PLAN:     Pre-op Assessment    I have reviewed the Patient Summary Reports.     I have reviewed the Nursing Notes. I have reviewed the NPO Status.   I have reviewed the Medications.     Review of Systems  Anesthesia Hx:  No problems with previous Anesthesia             Denies Family Hx of Anesthesia complications.    Denies Personal Hx of Anesthesia complications.                    Social:  Non-Smoker, No Alcohol Use       Hematology/Oncology:       -- Denies Anemia:                                  EENT/Dental:  EENT/Dental Normal           Cardiovascular:      Denies Hypertension.   Denies MI.      Denies Dysrhythmias.    Denies CHF.                                   Pulmonary:    Denies COPD.  Denies Asthma.     Denies Sleep Apnea.                Renal/:   Denies Chronic Renal Disease.                Hepatic/GI:      Denies GERD. Denies Liver Disease.               Neurological:    Denies CVA.    Denies Seizures.                                Endocrine:  Diabetes, " type 2               Physical Exam  General: Well nourished, Cooperative, Alert and Oriented    Airway:  Mallampati: II   Mouth Opening: Normal  TM Distance: Normal  Tongue: Normal  Neck ROM: Normal ROM    Dental:  Intact    Chest/Lungs:  Normal Respiratory Rate    Heart:  Rate: Normal        Anesthesia Plan  Type of Anesthesia, risks & benefits discussed:    Anesthesia Type: Gen ETT  Intra-op Monitoring Plan: Standard ASA Monitors  Post Op Pain Control Plan: multimodal analgesia and IV/PO Opioids PRN  Induction:  IV  Airway Plan: Direct and Video, Post-Induction  Informed Consent: Informed consent signed with the Patient and all parties understand the risks and agree with anesthesia plan.  All questions answered.   ASA Score: 2  Day of Surgery Review of History & Physical: H&P Update referred to the surgeon/provider.    Ready For Surgery From Anesthesia Perspective.     .

## 2025-04-17 NOTE — ED TRIAGE NOTES
Shlomo Wallace, a 54 y.o. male presents to the ED w/ complaint of right knee injury. Patient states he jumped out of the bed of the truck when his knee right knee suddenly gave out. Obvious deformity noted the right knee. Patient denies numbness or tingling to the right leg. Palpable pedal pulse noted. Denies hitting his head. Denies LOC.     Triage note:  Chief Complaint   Patient presents with    Fall     Jumped out of bed of truck and injured right knee.      Review of patient's allergies indicates:  No Known Allergies  Past Medical History:   Diagnosis Date    Diabetes mellitus, type 2

## 2025-04-17 NOTE — TRANSFER OF CARE
"Anesthesia Transfer of Care Note    Patient: Shlomo Wallace    Procedure(s) Performed: Procedure(s) (LRB):  APPLICATION, EXTERNAL FIXATION DEVICE, LOWER EXTREMITY - RIGHT (Right)    Patient location: PACU    Anesthesia Type: general    Transport from OR: Transported from OR on 6-10 L/min O2 by face mask with adequate spontaneous ventilation    Post pain: adequate analgesia    Post assessment: no apparent anesthetic complications and tolerated procedure well    Post vital signs: stable    Level of consciousness: sedated    Nausea/Vomiting: no nausea/vomiting    Complications: none    Transfer of care protocol was followed      Last vitals: Visit Vitals  BP (!) 148/94   Pulse 94   Temp 36.4 °C (97.5 °F) (Oral)   Resp 18   Ht 6' 4" (1.93 m)   Wt 120.2 kg (265 lb)   SpO2 97%   BMI 32.26 kg/m²     "

## 2025-04-17 NOTE — PLAN OF CARE
[facility] - Surgery (2nd Fl)  Initial Discharge Assessment       Primary Care Provider: Cindy Castillo MD    Admission Diagnosis: Knee pain [M25.569]  Fall [W19.XXXA]  Preop cardiovascular exam [Z01.810]  Closed fracture of right tibial plateau, initial encounter [S82.141A]  Closed bicondylar fracture of right tibial plateau [S82.141A]    Admission Date: 4/17/2025  Expected Discharge Date:     Transition of Care Barriers: (P) None    Payor: CIGNA / Plan: CIGNA OPEN ACCESS PLUS / Product Type: Commercial /     Extended Emergency Contact Information  Primary Emergency Contact: Kesha Narayan  Address: [address]  Mobile Phone: [phone]  Relation: Mother  Preferred language: English   needed? No    Discharge Plan A: (P) Rehab  Discharge Plan B: (P) Home Health      [id number] [place], [place] - [address] [facility]  [address] [facility] [address]  Phone: [phone] Fax: [phone]      Initial Assessment (most recent)       Adult Discharge Assessment - 04/17/25 1625          Discharge Assessment    Assessment Type Discharge Planning Assessment (P)      Confirmed/corrected address, phone number and insurance Yes (P)      Confirmed Demographics Correct on Facesheet (P)      Source of Information patient;family;health record (P)      Does patient/caregiver understand observation status Yes (P)      Communicated JN with patient/caregiver Yes (P)      People in Home parent(s) (P)      Do you expect to return to your current living situation? Other (see comments) (P)    on-going    Prior to hospitilization cognitive status: Alert/Oriented (P)      Current cognitive status: Alert/Oriented (P)      Equipment Currently Used at Home none (P)      Readmission within 30 days? No (P)      Do you take prescription medications? Yes (P)      Do you have prescription coverage? Yes (P)      How do you get to  doctors appointments? family or friend will provide;car, drives self (P)      Are you on dialysis? No (P)      Discharge Plan A Rehab (P)      Discharge Plan B Home Health (P)      DME Needed Upon Discharge  crutches;other (see comments) (P)    w/c ?    Discharge Plan discussed with: Patient;Parent(s) (P)      Transition of Care Barriers None (P)         Physical Activity    On average, how many days per week do you engage in moderate to strenuous exercise (like a brisk walk)? 0 days (P)         Financial Resource Strain    How hard is it for you to pay for the very basics like food, housing, medical care, and heating? Not hard at all (P)         Housing Stability    In the last 12 months, was there a time when you were not able to pay the mortgage or rent on time? No (P)      At any time in the past 12 months, were you homeless or living in a shelter (including now)? No (P)         Transportation Needs    In the past 12 months, has lack of transportation kept you from medical appointments or from getting medications? No (P)      In the past 12 months, has lack of transportation kept you from meetings, work, or from getting things needed for daily living? No (P)         Food Insecurity    Within the past 12 months, you worried that your food would run out before you got the money to buy more. Never true (P)         Stress    Do you feel stress - tense, restless, nervous, or anxious, or unable to sleep at night because your mind is troubled all the time - these days? Only a little (P)         Social Isolation    How often do you feel lonely or isolated from those around you?  Patient declined (P)         Alcohol Use    Q1: How often do you have a drink containing alcohol? Patient declined (P)      Q2: How many drinks containing alcohol do you have on a typical day when you are drinking? Patient declined (P)         APJeT    In the past 12 months has the electric, gas, oil, or water company threatened to shut off  services in your home? No (P)         Health Literacy    How often do you need to have someone help you when you read instructions, pamphlets, or other written material from your doctor or pharmacy? Sometimes (P)

## 2025-04-17 NOTE — PLAN OF CARE
Discharge planning     Chart reviewed today   Care plan discussed at bedside   Family also at bedside   Per Orthopedics,  trauma surgery today   Initial  Case Management assessment done in the emergency room.   Disposition-  ongoing,  per surgery team,  he will potentially have exterior rods from the leg.   Rehab versus home health.   Await bed/floor assignment   Case Management  team to follow tomorrow

## 2025-04-17 NOTE — HPI
Shlomo Wallace is a 54 y.o. male with no significant past medical history presenting with right knee pain.  The patient reports that today, while at work, he fell 3 ft out of a truck and felt his right leg give way.  He had immediate onset of pain that has been unable to bear weight since the time of injury.  The injury occurred at approximately 10:30 a.m..  Patient denies any head trauma or LOC. The patient denies prior hx of falls. Patient denies numbness and tingling to the RLE. Denies any other musculoskeletal pain or injuries. No known history of prior right knee injury or surgery.    Ambulates without assistance@ baseline   Tobacco Use: Lifetime nonsmoker  Denies IVDU   Lives with his mother in [place], [place]. No stairs to enter the home.    Anticoagulation:  None at baseline  Immunosuppressive medications:  None at baseline  Occupation:  He works for [employer]  No hx of MI, CVA, DVT/PE  No hx of cancer, chemotherapy, or radiation

## 2025-04-17 NOTE — OP NOTE
OP NOTE    DOS:  04/17/2025    Preop Dx: Right bicondylar tibial plateau fracture    Postop Dx: Right bicondylar tibial plateau fracture    Procedure: Spanning external fixation right bicondylar tibial plateau fracture - 20690    Closed treatment of right bicondylar tibial plateau fracture with manipulation under anesthesia -     Surgeon: Marshal Manuel M.D.    Asst:  Sean Don M.D    Anesthesia: GETA    EBL:  Minimal    IVF:  500 cc crystalloid    Implants: Synthes large ex fix    Specimens: None    Findings: Significant reduction in swelling with stabilization    Dispo:  To PACU extubated/stable       Indications for Procedure:      54-year-old male stepped about 3 ft off of a truck at work and sustained a bicondylar right tibial plateau fracture.  He presented emergency department root was evaluated and found of the fracture.  He had significant swelling we are taking him urgently/emergently to the operating room for spanning external fixation.  The risks, benefits and alternatives to surgery were discussed the patient prior to going the operating room.  Informed consent was obtained.    Procedure in Detail:    Patient was identified in preoperative holding area and the site was marked.  Patient was wheeled to the operating room and placed on the operating table in supine position.  General endotracheal anesthesia was induced and preoperative antibiotics were administered.  Right lower extremity was prepped and draped in sterile fashion.  A time-out was undertaken to confirm patient, side, site, surgery, surgeon and administration of preoperative antibiotics.  All agreed we proceeded.      I began in the tibia and marked out for a 4 pin clamp.  I made 2 small stab incisions, drilled 2 holes and placed 2 half Shantz pins.  I went up to the femur and made a stab incision and placed a drill hole followed by a Shantz pin.  I then marked out a 4 pin clamp and performed the same more proximally.  I checked the pin  lengths under fluoroscopy.  I then placed the 4 pin clamps and built a lateral bar construct to pull the patient out of varus and out to length.  I confirmed under fluoroscopy that we had got him out to length.  I then built a medial bar construct.  I took AP and lateral fluoroscopic views confirming good length and alignment.  I would normally want to distract a little bit more for ease with future fixation but given his swelling did not want to add any extra tension.    At this point I reassessed his compartments and he had significant decrease in swelling with stabilization.  Compartments were spongy.  The pin sites were overwrapped with Kerlix over Hibiclens soap scrub sponges.    Knee was aspirated relieving significant hemarthrosis.    All instrument sponge counts were reported correct at the end the case there were complications.  The patient was extubated, awakened and taken to recovery room in stable condition.    Marshal Manuel MD

## 2025-04-18 PROCEDURE — 63600175 PHARM REV CODE 636 W HCPCS

## 2025-04-18 PROCEDURE — 96375 TX/PRO/DX INJ NEW DRUG ADDON: CPT

## 2025-04-18 PROCEDURE — G0378 HOSPITAL OBSERVATION PER HR: HCPCS

## 2025-04-18 PROCEDURE — 25000003 PHARM REV CODE 250

## 2025-04-18 PROCEDURE — 97165 OT EVAL LOW COMPLEX 30 MIN: CPT

## 2025-04-18 PROCEDURE — 97116 GAIT TRAINING THERAPY: CPT

## 2025-04-18 PROCEDURE — 97161 PT EVAL LOW COMPLEX 20 MIN: CPT

## 2025-04-18 PROCEDURE — 97535 SELF CARE MNGMENT TRAINING: CPT

## 2025-04-18 PROCEDURE — 94761 N-INVAS EAR/PLS OXIMETRY MLT: CPT

## 2025-04-18 RX ORDER — PANTOPRAZOLE SODIUM 40 MG/1
40 TABLET, DELAYED RELEASE ORAL DAILY
Status: DISCONTINUED | OUTPATIENT
Start: 2025-04-19 | End: 2025-04-20 | Stop reason: HOSPADM

## 2025-04-18 RX ADMIN — PANTOPRAZOLE SODIUM 40 MG: 40 INJECTION, POWDER, FOR SOLUTION INTRAVENOUS at 09:04

## 2025-04-18 RX ADMIN — GABAPENTIN 300 MG: 300 CAPSULE ORAL at 02:04

## 2025-04-18 RX ADMIN — ACETAMINOPHEN 1000 MG: 500 TABLET ORAL at 09:04

## 2025-04-18 RX ADMIN — METHOCARBAMOL 500 MG: 500 TABLET ORAL at 09:04

## 2025-04-18 RX ADMIN — ASPIRIN 81 MG: 81 TABLET, COATED ORAL at 09:04

## 2025-04-18 RX ADMIN — ACETAMINOPHEN 1000 MG: 500 TABLET ORAL at 02:04

## 2025-04-18 RX ADMIN — GABAPENTIN 300 MG: 300 CAPSULE ORAL at 09:04

## 2025-04-18 RX ADMIN — OXYCODONE 5 MG: 5 TABLET ORAL at 06:04

## 2025-04-18 RX ADMIN — CEFAZOLIN 2 G: 2 INJECTION, POWDER, FOR SOLUTION INTRAMUSCULAR; INTRAVENOUS at 06:04

## 2025-04-18 RX ADMIN — CELECOXIB 200 MG: 200 CAPSULE ORAL at 09:04

## 2025-04-18 RX ADMIN — METHOCARBAMOL 500 MG: 500 TABLET ORAL at 02:04

## 2025-04-18 RX ADMIN — METHOCARBAMOL 500 MG: 500 TABLET ORAL at 06:04

## 2025-04-18 NOTE — PT/OT/SLP EVAL
Physical Therapy  Co-Evaluation and treatment with OT    Patient Name:  Shlomo Wallace   MRN:  [id number]    Recommendations:     Discharge Recommendations: High Intensity Therapy   Discharge Equipment Recommendations: walker, rolling   Barriers to discharge: Decreased caregiver support    Assessment:     Shlomo Wallace is a 54 y.o. male admitted with a medical diagnosis of Closed bicondylar fracture of right tibial plateau.  He presents with the following impairments/functional limitations: impaired endurance, impaired functional mobility, gait instability, decreased safety awareness, impaired balance pt tolerated treatment well but pain limited functional mobility. Pt will benefit from skilled PT daily to progress.  Pt is significantly below previous functional level, increased risk of falls and increased burden of care currently. Patient presents with good participation and motivation to return to prior level of function with high intensity therapy.  The patient demonstrates appropriate endurance to participate in up to 3 hours or 15hrs of combined therapy post acute.Pt is s/p external fixator R tibial plateau 4/17/25.    Rehab Prognosis: Good; patient would benefit from acute skilled PT services to address these deficits and reach maximum level of function.    Recent Surgery: Procedure(s) (LRB):  APPLICATION, EXTERNAL FIXATION DEVICE, LOWER EXTREMITY - RIGHT (Right) 1 Day Post-Op    Plan:     During this hospitalization, patient to be seen daily to address the identified rehab impairments via gait training, therapeutic activities and progress toward the following goals:    Plan of Care Expires:  05/16/25    Subjective     Chief Complaint: pt c/o pain during treatment.   Patient/Family Comments/goals:  to get better and go home.   Pain/Comfort:  Pain Rating 1: 4/10 (RLE)  Pain Rating Post-Intervention 1: 8/10 (RLE)    Patients cultural, spiritual, Evangelical conflicts given the current situation:  no    Living Environment:  Pt lives with his mother in 1 story slab. Pt works full-time and his mother is retired.   Prior to admission, patients level of function was Independent .  Equipment used at home:  (walk in shower with grab bar).  DME owned (not currently used): none.  Upon discharge, patient will have assistance from mom.    Objective:     Communicated with nurse  prior to session.  Patient found supine with telemetry, FCD (hep lock IV,external fixator)  upon PT entry to room.    General Precautions: Standard, fall  Orthopedic Precautions:RLE non weight bearing   Braces:  (external fixator)  Respiratory Status: Room air    Exams:  Cognitive Exam:  Patient is oriented to Person, Place, Time, and Situation  LLE ROM: WFL  LLE Strength: WFL    RLE not tested due to external fixator.     Functional Mobility:  Bed Mobility:  pt needed verbal cues for hand placement and sequencing for functional mobility.    Rolling Right: stand by assistance  Supine to Sit: stand by assistance pt was total assist RLE management.     Transfers:     Sit to Stand:  contact guard assistance with rolling walker    Gait: pt received gait training ~ 16 ft with RW and CGA. Pt was NWB RLE.     Balance: pt stood at sink and performed ADLS with OT with CGA then needing max assist to stand. Pt was put sitting in chair with BLE elevated.     Due to pt complex medical condition, the skill of 2 licensed therapists is needed to maximize treatment session and progression towards goals  Pt white board updated with current therapists name and level of mobility assistance needed.       AM-PAC 6 CLICK MOBILITY  Total Score:15       Treatment & Education:  Pt and mother received verbal instructions in role of PT and POC. Both verbally expressed understanding of such.     Patient left up in chair with all lines intact, call button in reach, RN  notified, mother  present, and BLE elevated and FCD on.  .    GOALS:   Multidisciplinary Problems        Physical Therapy Goals          Problem: Physical Therapy    Goal Priority Disciplines Outcome Interventions   Physical Therapy Goal     PT, PT/OT Progressing    Description: Goals to be met by: 25     Patient will increase functional independence with mobility by performin. Supine to sit with Contact Guard Assistance  2. Sit to stand transfer with Contact Guard Assistance with RW. NWB RLE  3. Gait  x 50 feet with Contact Guard Assistance using Rolling Walker NWB RLE                         DME Justifications:   Shlomo's mobility limitation cannot be sufficiently resolved by the use of a cane. His functional mobility deficit can be sufficiently resolved with the use of a Rolling Walker. Patient's mobility limitation significantly impairs their ability to participate in one of more activities of daily living.  The use of a RW will significantly improve the patient's ability to participate in MRADLS and the patient will use it on regular basis in the home.    History:     Past Medical History:   Diagnosis Date    Diabetes mellitus, type 2        History reviewed. No pertinent surgical history.    Time Tracking:     PT Received On: 25  PT Start Time: 908     PT Stop Time: 934  PT Total Time (min): 26 min     Billable Minutes: Evaluation 8 min  and Gait Training 18 min       2025

## 2025-04-18 NOTE — SUBJECTIVE & OBJECTIVE
"Principal Problem:Closed bicondylar fracture of right tibial plateau    Principal Orthopedic Problem:  As above s/p ex fix of right bicondylar tibial plateau fracture on 04/17/2025.    Interval History:  No acute events overnight.  Afebrile, hemodynamically stable.  He reports that his pain has slightly increased over the past 1 hour, now 6/10.  He states that this may be related to pain medicines wearing off.  He is still yet to require any PRN opioid medications since arrival to the floor.  Denies numbness and tingling to the right foot.  Denies nausea, vomiting, diarrhea, chest pain, shortness of breath.  He has remained NPO overnight.    Review of patient's allergies indicates:  No Known Allergies    Current Facility-Administered Medications   Medication    acetaminophen tablet 1,000 mg    aspirin EC tablet 81 mg    ceFAZolin 2 g    celecoxib capsule 200 mg    dextrose 50% injection 12.5 g    dextrose 50% injection 25 g    gabapentin capsule 300 mg    glucagon (human recombinant) injection 1 mg    glucose chewable tablet 16 g    glucose chewable tablet 24 g    HYDROmorphone (PF) injection 0.5 mg    methocarbamoL tablet 500 mg    naloxone 0.4 mg/mL injection 0.02 mg    ondansetron injection 4 mg    oxyCODONE immediate release tablet 5 mg    oxyCODONE immediate release tablet Tab 10 mg    pantoprazole injection 40 mg    polyethylene glycol packet 17 g    prochlorperazine injection Soln 2.5 mg    senna-docusate 8.6-50 mg per tablet 1 tablet    sodium chloride 0.9% flush 10 mL     Objective:     Vital Signs (Most Recent):  Temp: 97.8 °F (36.6 °C) (04/18/25 0006)  Pulse: 82 (04/18/25 0304)  Resp: 18 (04/18/25 0006)  BP: 127/83 (04/18/25 0006)  SpO2: 98 % (04/18/25 0006) Vital Signs (24h Range):  Temp:  [97.5 °F (36.4 °C)-99 °F (37.2 °C)] 97.8 °F (36.6 °C)  Pulse:  [78-94] 82  Resp:  [11-20] 18  SpO2:  [93 %-100 %] 98 %  BP: (127-167)/() 127/83     Weight: 124.7 kg (275 lb)  Height: 6' 4" (193 cm)  Body mass " index is 33.47 kg/m².      Intake/Output Summary (Last 24 hours) at 4/18/2025 0442  Last data filed at 4/18/2025 0118  Gross per 24 hour   Intake 850 ml   Output 500 ml   Net 350 ml        Ortho/SPM Exam  NAD, resting comfortably in bed  A&O x3   Breathing comfortably w/o distress   Extremities WWP     RLE:   Knee spanning external fixators in place.    The patient is iced and elevated on a stack of blankets.      Pain: controlled; currently 6/10 currently  Pallor: none  Paraesthesias: none  Poikilothermia: none, extremity is WWP  Pulse:  2+ DP and PT pulse, cap refill < 2 seconds  Paralysis: none, ROM of the toes intact. AROM of ankle intact without pain.   Compartments:  Significant swelling to the proximal tibia.  The posterior compartment is compressible and unchanged from prior exams.  The lateral compartment remains compressible and is slightly less swollen as compared to my previous exam.  Distally, the patient is soft and compressible.  No pain with passive stretch of the great toe.     Significant Labs:   Recent Lab Results         04/17/25  1434   04/17/25  1229   04/17/25  1221        ABO and RH A POS           Albumin   4.1         ALP   73         ALT   47         Anion Gap   11         PTT   22.4  Comment: Refer to local heparin nomogram for intensity/dose specific therapeutic range.         AST   31         Baso #   0.04         Basophil %   0.5         BILIRUBIN TOTAL   0.9  Comment: For infants and newborns, interpretation of results should be based   on gestational age, weight and in agreement with clinical   observations.    Premature Infant recommended reference ranges:   0-24 hours:  <8.0 mg/dL   24-48 hours: <12.0 mg/dL   3-5 days:    <15.0 mg/dL   6-29 days:   <15.0 mg/dL         BUN   11         Calcium   9.2         Chloride   108         CO2   22         Creatinine   0.9         eGFR   >60  Comment: Estimated GFR calculated using the CKD-EPI creatinine (2021) equation.         Eos #    0.10         Eos %   1.2         Estimated Avg Glucose   171         Glucose   169         Gran # (ANC)   6.08         Group & Rh   A POS         Hematocrit   41.2         Hemoglobin   13.6         Hemoglobin A1C External   7.6  Comment: ADA Screening Guidelines:  5.7-6.4%  Consistent with prediabetes  >=6.5%  Consistent with diabetes    High levels of fetal hemoglobin interfere with the HbA1C  assay. Heterozygous hemoglobin variants (HbS, HgC, etc)do  not significantly interfere with this assay.   However, presence of multiple variants may affect accuracy.         Immature Grans (Abs)   0.10  Comment: Mild elevation in immature granulocytes is non specific and can be seen in a variety of conditions including stress response, acute inflammation, trauma and pregnancy. Correlation with other laboratory and clinical findings is essential.         Immature Granulocytes   1.2         INDIRECT LEIF   NEG         INR   1.1  Comment: Coumadin Therapy:    2.0 - 3.0 for INR for all indicators except mechanical heart valves    and antiphospholipid syndromes which should use 2.5 - 3.5.         Lymph #   1.49         Lymph %   17.7         Magnesium    1.7         MCH   28.3         MCHC   33.0         MCV   86         Mono #   0.59         Mono %   7.0         MPV   10.6         Neut %   72.4         nRBC   0         QRS Duration     116       OHS QTC Calculation     435       Platelet Count   148         Potassium   3.7         Prealbumin 26           PROTEIN TOTAL   7.4         PT   12.0         RBC   4.80         RDW   13.2         Sodium   141         Specimen Outdate   04/20/2025 23:59         Transferrin   231         Vitamin D 26  Comment: Vitamin D deficiency.........<10 ng/mL                              Vitamin D insufficiency......10-29 ng/mL       Vitamin D sufficiency........> or equal to 30 ng/mL  Vitamin D toxicity............>100 ng/mL             WBC   8.40               All pertinent labs within the past 24  hours have been reviewed.    Significant Imaging: I have reviewed all pertinent imaging results/findings.

## 2025-04-18 NOTE — PROGRESS NOTES
Pharmacist Intervention IV to PO Note    Shlomo Wallace is a 54 y.o. male being treated with IV medication  pantoprazole    Patient Data:    Vital Signs (Most Recent):  Temp: 98.6 °F (37 °C) (04/18/25 1122)  Pulse: 92 (04/18/25 1122)  Resp: 18 (04/18/25 1122)  BP: 129/72 (04/18/25 1122)  SpO2: 96 % (04/18/25 1122) Vital Signs (72h Range):  Temp:  [97.5 °F (36.4 °C)-99 °F (37.2 °C)]   Pulse:  [78-94]   Resp:  [11-20]   BP: (127-167)/()   SpO2:  [93 %-100 %]      CBC:  Recent Labs   Lab 04/17/25  1229   WBC 8.40   RBC 4.80   HGB 13.6*   HCT 41.2   *   MCV 86   MCH 28.3   MCHC 33.0     CMP:     Recent Labs   Lab 04/17/25  1229   CALCIUM 9.2   ALBUMIN 4.1      K 3.7   CO2 22*      BUN 11   CREATININE 0.9   ALKPHOS 73   ALT 47*   AST 31   BILITOT 0.9       Dietary Orders:  Diet Orders            Diet Adult Regular Standard Tray: Regular starting at 04/18 1050            Based on the following criteria, this patient qualifies for intravenous to oral conversion:  [x] The patients gastrointestinal tract is functioning (tolerating medications via oral or enteral route for 24 hours and tolerating food or enteral feeds for 24 hours).  [x] The patient is hemodynamically stable for 24 hours (heart rate <100 beats per minute, systolic blood pressure >99 mm Hg, and respiratory rate <20 breaths per minute).  [x] The patient shows clinical improvement (afebrile for at least 24 hours and white blood cell count downtrending or normalized). Additionally, the patient must be non-neutropenic (absolute neutrophil count >500 cells/mm3).  [x] For antimicrobials, the patient has received IV therapy for at least 24 hours.    IV medication pantoprazole will be changed to oral medication pantoprazole    Pharmacist's Name: Krupa Salas  Pharmacist's Extension: [phone]

## 2025-04-18 NOTE — CARE UPDATE
Orthopedic surgery care update:     21:30: Pt seen and examined at bedside on the floor.  Reports that his pain is not that bad.  Currently 5-6/10.  He has not required any PRN's since arrival to the floor.      Leg is elevated on a stack of sheets with ice in place.  Knee spanning ex fix is in place.     Vitals          Vitals:     04/17/25 1430 04/17/25 1445 04/17/25 1500 04/17/25 1525   BP: (!) 161/98 (!) 162/99 (!) 157/85 (!) 148/94   BP Location:           Pulse: 81 82 89 94   Resp: 20 15 16 18   Temp:       97.5 °F (36.4 °C)   TempSrc:       Oral   SpO2: 95% 95% 95% 97%   Weight:           Height:                 Temp:  [97.5 °F (36.4 °C)-98.5 °F (36.9 °C)]       Pain: controlled; currently 5-6/10  Pallor: none  Paraesthesias: none  Poikilothermia: none, extremity is WWP  Pulse:  2+ DP and PT pulse, cap refill < 2 seconds  Paralysis: none, ROM of the toes intact. AROM of ankle intact without pain.   Compartments:  He remains significantly swollen over the proximal tibia at the level of his fracture.  At this level, the medial and posterior aspects of the leg overlying the posterior compartment are compressible with minimal pain.  On the lateral side, he is significantly swollen but remains compressible and unchanged from prior exams.  Distally, he is soft and compressible throughout.  No pain with passive stretch of the great toe.     No sign of compartment syndrome at this time. Continue to aggressively monitor and PAGE orthopedics with any change in exam. Will continue to monitor with serial exams.      ALEJANDRO Don MD  Orthopaedic Surgery   Resident Physician, PGY-2  04/17/2025

## 2025-04-18 NOTE — PLAN OF CARE
Problem: Occupational Therapy  Goal: Occupational Therapy Goal  Description: Goals to be met by: 5/2/25     Patient will increase functional independence with ADLs by performing:    UE Dressing with Modified [place].  LE Dressing with Modified [place] and Assistive Devices as needed.  Grooming while standing at sink with Modified [place].  Toileting from bedside commode with Modified [place] for hygiene and clothing management.   Bathing from  shower chair/bench with Modified [place].  Toilet transfer to bedside commode with Modified [place].  Increased functional strength to WFL for B UE.  Upper extremity exercise program x15 reps per handout, with assistance as needed.  Pt will state all WB precautions.    Outcome: Progressing

## 2025-04-18 NOTE — PROGRESS NOTES
[facility] - Surgery  Orthopedics  Progress Note    Patient Name: Shlomo Wallace  MRN: [id number]  Admission Date: 4/17/2025  Hospital Length of Stay: 0 days  Attending Provider: Marshal Manuel MD  Primary Care Provider: Cindy Castillo MD  Follow-up For: Procedure(s) (LRB):  APPLICATION, EXTERNAL FIXATION DEVICE, LOWER EXTREMITY - RIGHT (Right)    Post-Operative Day: 1 Day Post-Op  Subjective:     Principal Problem:Closed bicondylar fracture of right tibial plateau    Principal Orthopedic Problem:  As above s/p ex fix of right bicondylar tibial plateau fracture on 04/17/2025.    Interval History:  No acute events overnight.  Afebrile, hemodynamically stable.  He reports that his pain has slightly increased over the past 1 hour, now 6/10.  He states that this may be related to pain medicines wearing off.  He is still yet to require any PRN opioid medications since arrival to the floor.  Denies numbness and tingling to the right foot.  Denies nausea, vomiting, diarrhea, chest pain, shortness of breath.  He has remained NPO overnight.    Review of patient's allergies indicates:  No Known Allergies    Current Facility-Administered Medications   Medication    acetaminophen tablet 1,000 mg    aspirin EC tablet 81 mg    ceFAZolin 2 g    celecoxib capsule 200 mg    dextrose 50% injection 12.5 g    dextrose 50% injection 25 g    gabapentin capsule 300 mg    glucagon (human recombinant) injection 1 mg    glucose chewable tablet 16 g    glucose chewable tablet 24 g    HYDROmorphone (PF) injection 0.5 mg    methocarbamoL tablet 500 mg    naloxone 0.4 mg/mL injection 0.02 mg    ondansetron injection 4 mg    oxyCODONE immediate release tablet 5 mg    oxyCODONE immediate release tablet Tab 10 mg    pantoprazole injection 40 mg    polyethylene glycol packet 17 g    prochlorperazine injection Soln 2.5 mg    senna-docusate 8.6-50 mg per tablet 1 tablet    sodium chloride 0.9% flush 10 mL     Objective:     Vital Signs (Most  "Recent):  Temp: 97.8 °F (36.6 °C) (04/18/25 0006)  Pulse: 82 (04/18/25 0304)  Resp: 18 (04/18/25 0006)  BP: 127/83 (04/18/25 0006)  SpO2: 98 % (04/18/25 0006) Vital Signs (24h Range):  Temp:  [97.5 °F (36.4 °C)-99 °F (37.2 °C)] 97.8 °F (36.6 °C)  Pulse:  [78-94] 82  Resp:  [11-20] 18  SpO2:  [93 %-100 %] 98 %  BP: (127-167)/() 127/83     Weight: 124.7 kg (275 lb)  Height: 6' 4" (193 cm)  Body mass index is 33.47 kg/m².      Intake/Output Summary (Last 24 hours) at 4/18/2025 0442  Last data filed at 4/18/2025 0118  Gross per 24 hour   Intake 850 ml   Output 500 ml   Net 350 ml        Ortho/SPM Exam  NAD, resting comfortably in bed  A&O x3   Breathing comfortably w/o distress   Extremities WWP     RLE:   Knee spanning external fixators in place.    The patient is iced and elevated on a stack of blankets.      Pain: controlled; currently 6/10 currently  Pallor: none  Paraesthesias: none  Poikilothermia: none, extremity is WWP  Pulse:  2+ DP and PT pulse, cap refill < 2 seconds  Paralysis: none, ROM of the toes intact. AROM of ankle intact without pain.   Compartments:  Significant swelling to the proximal tibia.  The posterior compartment is compressible and unchanged from prior exams.  The lateral compartment remains compressible and is slightly less swollen as compared to my previous exam.  Distally, the patient is soft and compressible.  No pain with passive stretch of the great toe.     Significant Labs:   Recent Lab Results         04/17/25  1434   04/17/25  1229   04/17/25  1221        ABO and RH A POS           Albumin   4.1         ALP   73         ALT   47         Anion Gap   11         PTT   22.4  Comment: Refer to local heparin nomogram for intensity/dose specific therapeutic range.         AST   31         Baso #   0.04         Basophil %   0.5         BILIRUBIN TOTAL   0.9  Comment: For infants and newborns, interpretation of results should be based   on gestational age, weight and in agreement with " clinical   observations.    Premature Infant recommended reference ranges:   0-24 hours:  <8.0 mg/dL   24-48 hours: <12.0 mg/dL   3-5 days:    <15.0 mg/dL   6-29 days:   <15.0 mg/dL         BUN   11         Calcium   9.2         Chloride   108         CO2   22         Creatinine   0.9         eGFR   >60  Comment: Estimated GFR calculated using the CKD-EPI creatinine (2021) equation.         Eos #   0.10         Eos %   1.2         Estimated Avg Glucose   171         Glucose   169         Gran # (ANC)   6.08         Group & Rh   A POS         Hematocrit   41.2         Hemoglobin   13.6         Hemoglobin A1C External   7.6  Comment: ADA Screening Guidelines:  5.7-6.4%  Consistent with prediabetes  >=6.5%  Consistent with diabetes    High levels of fetal hemoglobin interfere with the HbA1C  assay. Heterozygous hemoglobin variants (HbS, HgC, etc)do  not significantly interfere with this assay.   However, presence of multiple variants may affect accuracy.         Immature Grans (Abs)   0.10  Comment: Mild elevation in immature granulocytes is non specific and can be seen in a variety of conditions including stress response, acute inflammation, trauma and pregnancy. Correlation with other laboratory and clinical findings is essential.         Immature Granulocytes   1.2         INDIRECT LEIF   NEG         INR   1.1  Comment: Coumadin Therapy:    2.0 - 3.0 for INR for all indicators except mechanical heart valves    and antiphospholipid syndromes which should use 2.5 - 3.5.         Lymph #   1.49         Lymph %   17.7         Magnesium    1.7         MCH   28.3         MCHC   33.0         MCV   86         Mono #   0.59         Mono %   7.0         MPV   10.6         Neut %   72.4         nRBC   0         QRS Duration     116       OHS QTC Calculation     435       Platelet Count   148         Potassium   3.7         Prealbumin 26           PROTEIN TOTAL   7.4         PT   12.0         RBC   4.80         RDW   13.2          Sodium   141         Specimen Outdate   04/20/2025 23:59         Transferrin   231         Vitamin D 26  Comment: Vitamin D deficiency.........<10 ng/mL                              Vitamin D insufficiency......10-29 ng/mL       Vitamin D sufficiency........> or equal to 30 ng/mL  Vitamin D toxicity............>100 ng/mL             WBC   8.40               All pertinent labs within the past 24 hours have been reviewed.    Significant Imaging: I have reviewed all pertinent imaging results/findings.  Assessment/Plan:     * Closed bicondylar fracture of right tibial plateau  Shlomo Wallace is a 54 y.o. male with no significant past medical history who presents with a right bicondylar tibial plateau fracture.  He is closed, neurovascularly intact.  On initial presentation, the patient does not have compartment syndrome; however, given his moderate to severe swelling, we will plan to proceed to the operating room emergently for stabilization with an external fixator.  He last ate at 4:00 a.m. this morning.    Now s/p ex fix of right bicondylar tibial plateau fracture on 04/17/2025.  Serial exams of the right lower extremity performed overnight and the patient has not shown evidence of compartment syndrome or worsening swelling to the right lower extremity.  He has remained iced and elevated on a stack blankets overnight with slight improvement in the swelling in his lateral compartment.    NWB RLE  PT/OT   ASA 81 mg BID   Remain NPO for now   Postop Ancef x24 hours   Orthopedic surgery to continue to perform serial exams throughout the morning.            CAROLINE Don MD  Orthopedics  [facility] - Surgery

## 2025-04-18 NOTE — NURSING TRANSFER
Nursing Transfer Note      4/17/2025   6:45 PM    Nurse giving handoff:ABBY GleasonU RN  Nurse receiving handoff:RENETTA RN    Reason patient is being transferred: Post anesthesia care    Transfer To: [facility] 553    Transfer via bed    Transfer with cardiac monitoring    Transported by RNx2    Transfer Vital Signs:  Blood Pressure:149/88  Heart Rate:82  O2:97  Temperature:98.1  Respirations:15    Order for Tele Monitor? Yes    Any special needs or follow-up needed: Rn instructed to contact Few, MD if pt loses pulses, cap refill, foot becomes cold, leg becomes non compressible.    Patient belongings transferred with patient: Yes    Chart send with patient: Yes    Notified: family  Pt reassesed at 1840    Upon arrival to floor: patient oriented to room, call bell in reach, and bed in lowest position

## 2025-04-18 NOTE — PLAN OF CARE
Problem: Physical Therapy  Goal: Physical Therapy Goal  Description: Goals to be met by: 25     Patient will increase functional independence with mobility by performin. Supine to sit with Contact Guard Assistance  2. Sit to stand transfer with Contact Guard Assistance with RW. NWB RLE  3. Gait  x 50 feet with Contact Guard Assistance using Rolling Walker NWB RLE    Outcome: Progressing   Evaluation completed and goals appropriate. 2025

## 2025-04-18 NOTE — CARE UPDATE
Compartment Check 04/18/2025 4:30 PM    Pt seen and examined at bedside. Pain controlled. DANII since last examination. VSS. Resting comfortably in bed. Pain controlled on PO meds. Rates pain 4/10 at this time.  Leg is elevated on a stack of blankets ice in place. Ex fix in place.    Vitals:    04/18/25 0735 04/18/25 1057 04/18/25 1122 04/18/25 1538   BP: (!) 143/90  129/72 130/76   BP Location: Right arm  Right arm Left arm   Patient Position: Lying  Sitting Sitting   Pulse: 84 90 92 98   Resp: 18 18 18   Temp:   98.6 °F (37 °C) 98.6 °F (37 °C)   TempSrc: Oral  Oral Oral   SpO2: 98%  96% 95%   Weight:       Height:          Temp:  [97.6 °F (36.4 °C)-99 °F (37.2 °C)]      Pain: controlled  Pallor: none  Paraesthesias: none  Poikilothermia: none, extremity is WWP  Pulse:  DP and PT 2+, cap refill < 2 seconds  Paralysis: none, ROM of ankle and toes intact  Compartments: compressible  Anterior, lateral, and posterior compartments of the leg soft and compressible.   No pain with passive ROM of the ankle, or toes.    No sign of compartment syndrome at this time. Continue to aggressively monitor and PAGE orthopedics with any change in exam. He is now 24 hours post op. We will reexamine him tomorrow morning.    Virginia Nuñez MD  Orthopaedic Surgery, PGY1  04/18/2025

## 2025-04-18 NOTE — PT/OT/SLP EVAL
"Occupational Therapy   Evaluation and Discharge Note    Name: Shlomo Wallace  MRN: [id number]  Admitting Diagnosis: Closed bicondylar fracture of right tibial plateau  Recent Surgery: Procedure(s) (LRB):  APPLICATION, EXTERNAL FIXATION DEVICE, LOWER EXTREMITY - RIGHT (Right) 1 Day Post-Op    Recommendations:     Discharge Recommendations: High Intensity Therapy  Discharge Equipment Recommendations:  walker, rolling  Barriers to discharge:  None    Assessment:     Shlomo Wallace is a 54 y.o. male with a medical diagnosis of Closed bicondylar fracture of right tibial plateau.  He presents with ex-fix R LE. Performance deficits affecting function: weakness, impaired endurance, impaired self care skills, impaired functional mobility, impaired balance, orthopedic precautions.  Pt was able to perform supine/sit T/F c total assist and sit/stand T/F c CGA and RW.  Was able to walk from bed to bathroom c CGA and RW.  Pt became fatigued while standing at sink and had to sit in chair in bathroom after standing for approx. 5 min c CGA-max A.  Pt was able to maintain WB status until he became fatigued.  Performed grooming c min A and UB dressing c min A.  Pt is progressing well.    Rehab Prognosis: Good; patient would benefit from acute skilled OT services to address these deficits and reach maximum level of function.       Plan:     Patient to be seen 5 x/week to address the above listed problems via self-care/home management, therapeutic activities, therapeutic exercises  Plan of Care Expires: 05/02/25  Plan of Care Reviewed with: patient, mother    Subjective     Chief Complaint: Pt is s/p R TP fx and is now s/p ex-fix and is NWB R LE  Patient/Family Comments/goals: "I don't think I did very good."    Occupational Profile:  Living Environment: Pt lives in a 1 story house c 1 [address] and has a walk-in shower.  Previous level of function: I PTA  Roles and Routines: Drives tractor in [employer]  Equipment Used at Home: " grab bar  Assistance upon Discharge: Pt lives c his mother    Pain/Comfort:  Pain Rating 1: 4/10    Patients cultural, spiritual, Druze conflicts given the current situation: no    Objective:     Communicated with: RN prior to session.  Patient found supine with FCD, peripheral IV, telemetry, external fixator upon OT entry to room.    General Precautions: Standard, fall  Orthopedic Precautions: RLE non weight bearing  Braces: N/A  Respiratory Status: Room air    Occupational Performance:    Bed Mobility:    Patient completed Supine to Sit with total assistance    Functional Mobility/Transfers:  Patient completed Sit <> Stand Transfer with contact guard assistance  with  rolling walker   Patient completed Bed <> Chair Transfer using Stand Pivot technique with contact guard assistance with rolling walker  Functional Mobility: Pt was able to walk from bed to bathroom and after standing for approx. 5 min pt became very fatigued and had to sit in chair in bathroom.    Activities of Daily Living:  Grooming: contact guard assistance and maximal assistance to brush teeth while standing at sink c RW.  Upper Body Dressing: minimum assistance to don hospital gown.    Physical Exam:  Upper Extremity Range of Motion:     -       Right Upper Extremity: WFL  -       Left Upper Extremity: WFL  Upper Extremity Strength:    -       Right Upper Extremity: WFL  -       Left Upper Extremity: WFL    AMPAC 6 Click ADL:  AMPAC Total Score: 15    Patient left up in chair with all lines intact, call button in reach, RN notified, and mother present    GOALS:   Multidisciplinary Problems       Occupational Therapy Goals          Problem: Occupational Therapy    Goal Priority Disciplines Outcome Interventions   Occupational Therapy Goal     OT, PT/OT Progressing    Description: Goals to be met by: 5/2/25     Patient will increase functional independence with ADLs by performing:    UE Dressing with Modified [place].  LE Dressing with  Modified [place] and Assistive Devices as needed.  Grooming while standing at sink with Modified [place].  Toileting from bedside commode with Modified [place] for hygiene and clothing management.   Bathing from  shower chair/bench with Modified [place].  Toilet transfer to bedside commode with Modified [place].  Increased functional strength to WFL for B UE.  Upper extremity exercise program x15 reps per handout, with assistance as needed.  Pt will state all WB precautions.                         DME Justifications:   Shlomo requires a commode for home use because he is confined to one level of the home environment and there is no toilet on that level.   Shlomo's mobility limitation cannot be sufficiently resolved by the use of a cane. His functional mobility deficit can be sufficiently resolved with the use of a Rolling Walker. Patient's mobility limitation significantly impairs their ability to participate in one of more activities of daily living.  The use of a RW will significantly improve the patient's ability to participate in MRADLS and the patient will use it on regular basis in the home.    History:     Past Medical History:   Diagnosis Date    Diabetes mellitus, type 2        History reviewed. No pertinent surgical history.    Time Tracking:     OT Date of Treatment: 04/18/25  OT Start Time: 0908  OT Stop Time: 0934  OT Total Time (min): 26 min    Billable Minutes:Evaluation 13  Self Care/Home Management 13    4/18/2025

## 2025-04-18 NOTE — NURSING
Patient arrived in unit, settled in bed, mother at bedside. Vitals obtained, stable on 2LNC. RLE elevated with ex-fix and ice packs, 3+ swelling noted, DP/PT pulses palpable, marked. NPO for possible intervention. POC discussed at bedside with patient and mother, no immediate concerns noted.

## 2025-04-18 NOTE — ASSESSMENT & PLAN NOTE
Shlomo Wallace is a 54 y.o. male with no significant past medical history who presents with a right bicondylar tibial plateau fracture.  He is closed, neurovascularly intact.  On initial presentation, the patient does not have compartment syndrome; however, given his moderate to severe swelling, we will plan to proceed to the operating room emergently for stabilization with an external fixator.  He last ate at 4:00 a.m. this morning.    Now s/p ex fix of right bicondylar tibial plateau fracture on 04/17/2025.  Serial exams of the right lower extremity performed overnight and the patient has not shown evidence of compartment syndrome or worsening swelling to the right lower extremity.  He has remained iced and elevated on a stack blankets overnight with slight improvement in the swelling in his lateral compartment.    NWB RLE  PT/OT   ASA 81 mg BID   Remain NPO for now   Postop Ancef x24 hours   Orthopedic surgery to continue to perform serial exams throughout the morning.

## 2025-04-18 NOTE — ANESTHESIA POSTPROCEDURE EVALUATION
Anesthesia Post Evaluation    Patient: Shlomo Wallace    Procedure(s) Performed: Procedure(s) (LRB):  APPLICATION, EXTERNAL FIXATION DEVICE, LOWER EXTREMITY - RIGHT (Right)    Final Anesthesia Type: general      Patient location during evaluation: PACU  Patient participation: Yes- Able to Participate  Level of consciousness: awake  Post-procedure vital signs: reviewed and stable  Pain management: adequate  Airway patency: patent    PONV status at discharge: No PONV  Anesthetic complications: no      Cardiovascular status: blood pressure returned to baseline  Respiratory status: unassisted  Hydration status: euvolemic  Follow-up not needed.              Vitals Value Taken Time   /90 04/18/25 07:35   Temp 36.6 °C (97.8 °F) 04/18/25 04:49   Pulse 84 04/18/25 07:35   Resp 18 04/18/25 07:35   SpO2 98 % 04/18/25 07:35         Event Time   Out of Recovery 17:15:00         Pain/Errol Score: Pain Rating Prior to Med Admin: 6 (4/18/2025  6:09 AM)  Pain Rating Post Med Admin: 5 (4/17/2025 10:09 PM)  Errol Score: 10 (4/17/2025  5:15 PM)

## 2025-04-18 NOTE — CARE UPDATE
Orthopedic surgery care update:     22:30: Pt seen and examined at bedside on the floor.  Reports that his pain is well controlled; currently 3-4/10 @ rest after administration of Gabapentin and Robaxin.  He has not required any PRN's since arrival to the floor.      Leg is elevated on a stack of sheets with ice in place.  Knee spanning ex fix is in place.     Vitals          Vitals:     04/17/25 1430 04/17/25 1445 04/17/25 1500 04/17/25 1525   BP: (!) 161/98 (!) 162/99 (!) 157/85 (!) 148/94   BP Location:           Pulse: 81 82 89 94   Resp: 20 15 16 18   Temp:       97.5 °F (36.4 °C)   TempSrc:       Oral   SpO2: 95% 95% 95% 97%   Weight:           Height:                 Temp:  [97.5 °F (36.4 °C)-98.5 °F (36.9 °C)]       Pain: controlled; currently 3-4/10  Pallor: none  Paraesthesias: none  Poikilothermia: none, extremity is WWP  Pulse:  2+ DP and PT pulse, cap refill < 2 seconds  Paralysis: none, ROM of the toes intact. AROM of ankle intact without pain.   Compartments:  He remains significantly swollen over the proximal tibia at the level of his fracture.  At this level, the medial and posterior aspects of the leg overlying the posterior compartment are compressible with minimal pain.  On the lateral side, he is significantly swollen but remains compressible and unchanged from prior exams.  Distally, he is soft and compressible throughout.  No pain with passive stretch of the great toe.     No sign of compartment syndrome at this time. Continue to aggressively monitor and PAGE orthopedics with any change in exam. Will continue to monitor with serial exams.      ALEJANDRO Don MD  Orthopaedic Surgery   Resident Physician, PGY-2  04/17/2025

## 2025-04-18 NOTE — CARE UPDATE
Compartment Check 04/18/2025 12:20 PM    Pt seen and examined at bedside. Pain controlled. DANII since last examination. VSS. Resting comfortably in bed. Pain controlled on PO meds. Rates pain 4/10 at this time.  Leg is elevated on a stack of blankets ice in place. Ex fix in place.    Vitals:    04/18/25 0609 04/18/25 0735 04/18/25 1057 04/18/25 1122   BP:  (!) 143/90  129/72   BP Location:  Right arm  Right arm   Patient Position:  Lying  Sitting   Pulse:  84 90 92   Resp: 16 18 18   Temp:    98.6 °F (37 °C)   TempSrc:  Oral  Oral   SpO2:  98%  96%   Weight:       Height:          Temp:  [97.5 °F (36.4 °C)-99 °F (37.2 °C)]      Pain: controlled  Pallor: none  Paraesthesias: none  Poikilothermia: none, extremity is WWP  Pulse:  DP and PT 2+, cap refill < 2 seconds  Paralysis: none, ROM of ankle and toes intact  Compartments: compressible  Anterior, lateral, and posterior compartments of the leg soft and compressible.   No pain with passive ROM of the ankle, or toes.    No sign of compartment syndrome at this time. Continue to aggressively monitor and PAGE orthopedics with any change in exam. Will continue to monitor with serial exams.     Virginia Nuñez MD  Orthopaedic Surgery, PGY1  04/18/2025

## 2025-04-18 NOTE — CARE UPDATE
Orthopedic surgery care update:     19:30: Pt seen and examined at bedside on the floor.  Reports that his pain is better than before surgery. Currently 6/10.     Leg is elevated on a stack of sheets with ice in place.  Knee spanning ex fix is in place.     Vitals          Vitals:     04/17/25 1430 04/17/25 1445 04/17/25 1500 04/17/25 1525   BP: (!) 161/98 (!) 162/99 (!) 157/85 (!) 148/94   BP Location:           Pulse: 81 82 89 94   Resp: 20 15 16 18   Temp:       97.5 °F (36.4 °C)   TempSrc:       Oral   SpO2: 95% 95% 95% 97%   Weight:           Height:                 Temp:  [97.5 °F (36.4 °C)-98.5 °F (36.9 °C)]       Pain: controlled  Pallor: none  Paraesthesias: none  Poikilothermia: none, extremity is WWP  Pulse:  2+ DP and PT pulse, cap refill < 2 seconds  Paralysis: none, ROM of the toes intact. AROM of ankle intact without pain.   Compartments: remains significantly swollen at the proximal tibia. Compressible to the medial and posterior tibia and remains compressible to the lateral compartment despite being significantly swollen. Distally, he is less swollen and remains soft and compressible.   No pain with passive stretch of the great toe.     No sign of compartment syndrome at this time. Please continue to aggressively monitor and page orthopedics with any change in exam. Will recheck patient later this evening.      ALEJANDRO Don MD  Orthopaedic Surgery   Resident Physician, PGY-2  04/17/2025

## 2025-04-18 NOTE — CARE UPDATE
Orthopaedic surgery care update    Patient seen and examined at 0730. Pain well controlled at this time. Leg elevated on stack of sheets with ice in place. Knee spanning ex fix in place.     Exam    Pain: controlled; currently 3-4/10  Pallor: none  Paraesthesias: none  Poikilothermia: none, extremity is WWP  Pulse:  2+ DP and PT pulse, cap refill < 2 seconds  Paralysis: none, ROM of the toes intact. AROM of ankle intact without pain.   Compartments:  He remains significantly swollen over the proximal tibia at the level of his fracture.  At this level, the medial and posterior aspects of the leg overlying the posterior compartment are compressible with minimal pain.  On the lateral side, he is significantly swollen but remains compressible and unchanged from prior exams.  Distally, he is soft and compressible throughout.  No pain with passive stretch of the great toe.      No sign of compartment syndrome at this time. Continue to aggressively monitor and PAGE orthopedics with any change in exam. Will continue to monitor with serial exams.     Oscar Willson MD PGY3

## 2025-04-19 PROCEDURE — G0378 HOSPITAL OBSERVATION PER HR: HCPCS

## 2025-04-19 PROCEDURE — 97530 THERAPEUTIC ACTIVITIES: CPT | Mod: CQ

## 2025-04-19 PROCEDURE — 25000003 PHARM REV CODE 250

## 2025-04-19 PROCEDURE — 97535 SELF CARE MNGMENT TRAINING: CPT

## 2025-04-19 RX ORDER — CELECOXIB 200 MG/1
200 CAPSULE ORAL DAILY
Qty: 14 CAPSULE | Refills: 0 | Status: SHIPPED | OUTPATIENT
Start: 2025-04-20

## 2025-04-19 RX ORDER — ONDANSETRON 4 MG/1
8 TABLET, FILM COATED ORAL EVERY 6 HOURS PRN
Qty: 28 TABLET | Refills: 0 | Status: SHIPPED | OUTPATIENT
Start: 2025-04-19

## 2025-04-19 RX ORDER — METHOCARBAMOL 500 MG/1
500 TABLET, FILM COATED ORAL 4 TIMES DAILY
Qty: 56 TABLET | Refills: 0 | Status: SHIPPED | OUTPATIENT
Start: 2025-04-19 | End: 2025-05-03

## 2025-04-19 RX ORDER — ASPIRIN 81 MG/1
81 TABLET ORAL 2 TIMES DAILY
Qty: 120 TABLET | Refills: 0 | Status: SHIPPED | OUTPATIENT
Start: 2025-04-19 | End: 2025-06-18

## 2025-04-19 RX ORDER — OXYCODONE HYDROCHLORIDE 5 MG/1
5 TABLET ORAL EVERY 6 HOURS PRN
Qty: 28 TABLET | Refills: 0 | Status: SHIPPED | OUTPATIENT
Start: 2025-04-19 | End: 2025-04-24

## 2025-04-19 RX ORDER — ACETAMINOPHEN 500 MG
1000 TABLET ORAL EVERY 8 HOURS PRN
Qty: 84 TABLET | Refills: 0 | Status: SHIPPED | OUTPATIENT
Start: 2025-04-19

## 2025-04-19 RX ORDER — PANTOPRAZOLE SODIUM 40 MG/1
40 TABLET, DELAYED RELEASE ORAL DAILY
Qty: 30 TABLET | Refills: 0 | Status: SHIPPED | OUTPATIENT
Start: 2025-04-20 | End: 2025-05-20

## 2025-04-19 RX ORDER — POLYETHYLENE GLYCOL 3350 17 G/17G
17 POWDER, FOR SOLUTION ORAL DAILY PRN
Qty: 510 G | Refills: 0 | Status: SHIPPED | OUTPATIENT
Start: 2025-04-19

## 2025-04-19 RX ADMIN — ACETAMINOPHEN 1000 MG: 500 TABLET ORAL at 09:04

## 2025-04-19 RX ADMIN — GABAPENTIN 300 MG: 300 CAPSULE ORAL at 02:04

## 2025-04-19 RX ADMIN — METHOCARBAMOL 500 MG: 500 TABLET ORAL at 09:04

## 2025-04-19 RX ADMIN — METHOCARBAMOL 500 MG: 500 TABLET ORAL at 06:04

## 2025-04-19 RX ADMIN — GABAPENTIN 300 MG: 300 CAPSULE ORAL at 09:04

## 2025-04-19 RX ADMIN — ASPIRIN 81 MG: 81 TABLET, COATED ORAL at 09:04

## 2025-04-19 RX ADMIN — PANTOPRAZOLE SODIUM 40 MG: 40 TABLET, DELAYED RELEASE ORAL at 09:04

## 2025-04-19 RX ADMIN — CELECOXIB 200 MG: 200 CAPSULE ORAL at 09:04

## 2025-04-19 RX ADMIN — ACETAMINOPHEN 1000 MG: 500 TABLET ORAL at 02:04

## 2025-04-19 RX ADMIN — OXYCODONE HYDROCHLORIDE 10 MG: 10 TABLET ORAL at 10:04

## 2025-04-19 RX ADMIN — METHOCARBAMOL 500 MG: 500 TABLET ORAL at 02:04

## 2025-04-19 RX ADMIN — OXYCODONE HYDROCHLORIDE 10 MG: 10 TABLET ORAL at 06:04

## 2025-04-19 NOTE — ASSESSMENT & PLAN NOTE
Shlomo Wallace is a 54 y.o. male with no significant past medical history who presents with a right bicondylar tibial plateau fracture.  He is closed, neurovascularly intact.  On initial presentation, the patient does not have compartment syndrome; however, given his moderate to severe swelling, we will plan to proceed to the operating room emergently for stabilization with an external fixator.  He last ate at 4:00 a.m. this morning.    Now s/p ex fix of right bicondylar tibial plateau fracture on 04/17/2025    NWB RLE  PT/OT   ASA 81 mg BID   Regular diet  Postop Ancef x24 hours

## 2025-04-19 NOTE — PT/OT/SLP PROGRESS
"Occupational Therapy   Treatment    Name: Shlomo Wallace  MRN: [id number]  Admitting Diagnosis:  Closed bicondylar fracture of right tibial plateau  2 Days Post-Op    Recommendations:     Discharge Recommendations: High Intensity Therapy  Discharge Equipment Recommendations:  wheelchair, bedside commode, walker, rolling  Barriers to discharge:  None    Assessment:     Shlomo Wallace is a 54 y.o. male with a medical diagnosis of Closed bicondylar fracture of right tibial plateau.  He presents semi-reclined in bed w/ mother present, ex-fix to RLE elevated. Pt politely declines OOB activities during session due to intense pain earlier with PT. Significant time spent discussing transfer safety, DME use, and home set up with pt and mother in prep for dc home this PM per chart. Pt and mother report anxious feelings re: returning home. Primarily concerned with current level of mobility, safety with transfers, use of w/c w/ elevating leg rests, maintaining precautions with RLE, ADL transfers, car transfers, performing ADLs safely, and bed transfers. Due to body habitus, pt unlikely to fit into standard 18" wheelchair, MD & SW aware of need for larger chair. Pt will also require bariatric BSC at home due to inability to fit into restroom with leg in extension. Due to pt's current LOF and significant rehab needs, OT currently updating dc rec to high intensity. If pt must dc home, OT highly recommends pt remain in house x1-2 days for additional training with w/c, ADL transfers, and dynamic balance ADLs.     Deficits currently limit indep with ADLs, ADL t/fs, and functional mobility. Performance deficits affecting function are weakness, impaired endurance, pain, impaired self care skills, decreased lower extremity function, gait instability, impaired functional mobility, decreased safety awareness, impaired balance, orthopedic precautions.     Rehab Prognosis:  Good; patient would benefit from acute skilled OT services to " address these deficits and reach maximum level of function.       Plan:     Patient to be seen 5 x/week to address the above listed problems via self-care/home management, community/work re-entry, therapeutic activities, therapeutic exercises, neuromuscular re-education, wheelchair management/training  Plan of Care Expires: 05/02/25  Plan of Care Reviewed with: patient, mother    Subjective     Chief Complaint: pain in RLE, anxiety about dc home   Patient/Family Comments/goals: Return home safely  Pain/Comfort:   None reported    Objective:     Communicated with: RN prior to session.  Patient found HOB elevated with external fixator, peripheral IV upon OT entry to room.    General Precautions: Standard, fall    Orthopedic Precautions:RLE non weight bearing  Braces: N/A  Respiratory Status: Room air    AMPAC 6 Click ADL: 15    Treatment & Education:  Skilled education re: home set up, transfer training, and DME use. Pt deferring OOB activities this date. Will attempt to f/u this PM for w/c training if w/c is available and time allows.      Patient left HOB elevated with all lines intact, call button in reach, RN notified, and mother present    GOALS:   Multidisciplinary Problems       Occupational Therapy Goals          Problem: Occupational Therapy    Goal Priority Disciplines Outcome Interventions   Occupational Therapy Goal     OT, PT/OT Progressing    Description: Goals to be met by: 5/2/25     Patient will increase functional independence with ADLs by performing:    UE Dressing with Modified Cucumber.  LE Dressing with Modified Cucumber and Assistive Devices as needed.  Grooming while standing at sink with Modified Cucumber.  Toileting from bedside commode with Modified Cucumber for hygiene and clothing management.   Bathing from  shower chair/bench with Modified Cucumber.  Toilet transfer to bedside commode with Modified Cucumber.  Increased functional strength to WFL for B UE.  Upper  extremity exercise program x15 reps per handout, with assistance as needed.  Pt will state all WB precautions.                         DME Justifications:   Shlomo requires a commode for home use because he is confined to a single room.  Shlomo Wallace has a mobility limitation that significantly impairs his ability to participate in one or more mobility related activities of daily living (MRADLs) such as toileting, feeding, dressing, grooming, and bathing in customary locations in the home.  The mobility limitation cannot be sufficiently resolved by the use of a cane or walker.   The use of a manual wheelchair will significantly improve the patients ability to participate in MRADLS and the patient will use it on regular basis in the home.  Shlomo Wallace has expressed his willingness to use a manual wheelchair in the home. Patients upper body strength is sufficient for propulsion.  He also has a caregiver who is available, willing, and able to provide assistance with the wheelchair when needed.      Time Tracking:     OT Date of Treatment: 04/19/25  OT Start Time: 1225  OT Stop Time: 1308  OT Total Time (min): 43 min    Billable Minutes:Self Care/Home Management 43    OT/MOSES: OT          4/19/2025

## 2025-04-19 NOTE — SUBJECTIVE & OBJECTIVE
"Principal Problem:Closed bicondylar fracture of right tibial plateau    Principal Orthopedic Problem:  As above s/p ex fix of right bicondylar tibial plateau fracture on 04/17/2025.    Interval History:  No acute events overnight.  Afebrile, hemodynamically stable.  Pain is well-controlled. "pt received gait training ~ 16 ft with RW and CGA. Pt was NWB RLE" with PT yesterday.  No numbness and tingling of the right lower extremity.  Tolerating an oral diet since surgery.    Review of patient's allergies indicates:  No Known Allergies    Current Facility-Administered Medications   Medication    acetaminophen tablet 1,000 mg    aspirin EC tablet 81 mg    celecoxib capsule 200 mg    dextrose 50% injection 12.5 g    dextrose 50% injection 25 g    gabapentin capsule 300 mg    glucagon (human recombinant) injection 1 mg    glucose chewable tablet 16 g    glucose chewable tablet 24 g    HYDROmorphone (PF) injection 0.5 mg    methocarbamoL tablet 500 mg    naloxone 0.4 mg/mL injection 0.02 mg    ondansetron injection 4 mg    oxyCODONE immediate release tablet 5 mg    oxyCODONE immediate release tablet Tab 10 mg    pantoprazole EC tablet 40 mg    polyethylene glycol packet 17 g    prochlorperazine injection Soln 2.5 mg    senna-docusate 8.6-50 mg per tablet 1 tablet    sodium chloride 0.9% flush 10 mL     Objective:     Vital Signs (Most Recent):  Temp: 98.6 °F (37 °C) (04/19/25 0454)  Pulse: 97 (04/19/25 0454)  Resp: 17 (04/19/25 0454)  BP: 133/82 (04/19/25 0454)  SpO2: 97 % (04/19/25 0454) Vital Signs (24h Range):  Temp:  [97.4 °F (36.3 °C)-99 °F (37.2 °C)] 98.6 °F (37 °C)  Pulse:  [] 97  Resp:  [17-18] 17  SpO2:  [95 %-98 %] 97 %  BP: (129-143)/(72-99) 133/82     Weight: 124.7 kg (275 lb)  Height: 6' 4" (193 cm)  Body mass index is 33.47 kg/m².      Intake/Output Summary (Last 24 hours) at 4/19/2025 0624  Last data filed at 4/19/2025 0624  Gross per 24 hour   Intake --   Output 1950 ml   Net -1950 ml        Ortho/SPM " Exam  NAD, resting comfortably in bed  A&O x3   Breathing comfortably w/o distress   Extremities WWP     RLE:   Knee spanning external fixators in place.    The patient is iced and elevated on a stack of blankets.    Moderate swelling of the proximal tibia.  His swelling of the lateral compartment is improved from prior.  Soft and compressible to the lateral, posterior, and deep posterior compartments.  2+ DP pulse.    No pain with passive stretch of the great toe.    Sensation intact to light touch throughout.     Significant Labs:   Recent Lab Results       None          All pertinent labs within the past 24 hours have been reviewed.    Significant Imaging: I have reviewed all pertinent imaging results/findings.

## 2025-04-19 NOTE — PROGRESS NOTES
"[facility]  Orthopedics  Progress Note    Patient Name: Shlomo Wallace  MRN: [id number]  Admission Date: 4/17/2025  Hospital Length of Stay: 0 days  Attending Provider: Marshal Manuel MD  Primary Care Provider: Cindy Castillo MD  Follow-up For: Procedure(s) (LRB):  APPLICATION, EXTERNAL FIXATION DEVICE, LOWER EXTREMITY - RIGHT (Right)    Post-Operative Day: 2 Days Post-Op  Subjective:     Principal Problem:Closed bicondylar fracture of right tibial plateau    Principal Orthopedic Problem:  As above s/p ex fix of right bicondylar tibial plateau fracture on 04/17/2025.    Interval History:  No acute events overnight.  Afebrile, hemodynamically stable.  Pain is well-controlled. "pt received gait training ~ 16 ft with RW and CGA. Pt was NWB RLE" with PT yesterday.  No numbness and tingling of the right lower extremity.  Tolerating an oral diet since surgery.    Review of patient's allergies indicates:  No Known Allergies    Current Facility-Administered Medications   Medication    acetaminophen tablet 1,000 mg    aspirin EC tablet 81 mg    celecoxib capsule 200 mg    dextrose 50% injection 12.5 g    dextrose 50% injection 25 g    gabapentin capsule 300 mg    glucagon (human recombinant) injection 1 mg    glucose chewable tablet 16 g    glucose chewable tablet 24 g    HYDROmorphone (PF) injection 0.5 mg    methocarbamoL tablet 500 mg    naloxone 0.4 mg/mL injection 0.02 mg    ondansetron injection 4 mg    oxyCODONE immediate release tablet 5 mg    oxyCODONE immediate release tablet Tab 10 mg    pantoprazole EC tablet 40 mg    polyethylene glycol packet 17 g    prochlorperazine injection Soln 2.5 mg    senna-docusate 8.6-50 mg per tablet 1 tablet    sodium chloride 0.9% flush 10 mL     Objective:     Vital Signs (Most Recent):  Temp: 98.6 °F (37 °C) (04/19/25 0454)  Pulse: 97 (04/19/25 0454)  Resp: 17 (04/19/25 0454)  BP: 133/82 (04/19/25 0454)  SpO2: 97 % (04/19/25 0454) Vital Signs (24h Range):  Temp:  " "[97.4 °F (36.3 °C)-99 °F (37.2 °C)] 98.6 °F (37 °C)  Pulse:  [] 97  Resp:  [17-18] 17  SpO2:  [95 %-98 %] 97 %  BP: (129-143)/(72-99) 133/82     Weight: 124.7 kg (275 lb)  Height: 6' 4" (193 cm)  Body mass index is 33.47 kg/m².      Intake/Output Summary (Last 24 hours) at 4/19/2025 0624  Last data filed at 4/19/2025 0624  Gross per 24 hour   Intake --   Output 1950 ml   Net -1950 ml        Ortho/SPM Exam  NAD, resting comfortably in bed  A&O x3   Breathing comfortably w/o distress   Extremities WWP     RLE:   Knee spanning external fixators in place.    The patient is iced and elevated on a stack of blankets.    Moderate swelling of the proximal tibia.  His swelling of the lateral compartment is improved from prior.  Soft and compressible to the lateral, posterior, and deep posterior compartments.  2+ DP pulse.    No pain with passive stretch of the great toe.    Sensation intact to light touch throughout.     Significant Labs:   Recent Lab Results       None          All pertinent labs within the past 24 hours have been reviewed.    Significant Imaging: I have reviewed all pertinent imaging results/findings.  Assessment/Plan:     * Closed bicondylar fracture of right tibial plateau  Shlomo Wallace is a 54 y.o. male with no significant past medical history who presents with a right bicondylar tibial plateau fracture.  He is closed, neurovascularly intact.  On initial presentation, the patient does not have compartment syndrome; however, given his moderate to severe swelling, we will plan to proceed to the operating room emergently for stabilization with an external fixator.  He last ate at 4:00 a.m. this morning.    Now s/p ex fix of right bicondylar tibial plateau fracture on 04/17/2025    NWB RLE  PT/OT   ASA 81 mg BID   Regular diet  Postop Ancef x24 hours               W Sean Don MD  Orthopedics  [facility] - Surgery    "

## 2025-04-19 NOTE — PLAN OF CARE
[facility]  Discharge Final Note    Primary Care Provider: Cindy Castillo MD    Expected Discharge Date: 4/19/2025    Final Discharge Note (most recent)       Final Note - 04/19/25 1631          Final Note    Assessment Type Final Discharge Note     Anticipated Discharge Disposition Home or Self Care     What phone number can be called within the next 1-3 days to see how you are doing after discharge? [phone] (P)         Post-Acute Status    Discharge Delays Home Medical Equipment (Insurance, Delivery) (P)                    Pt to DC with BSC and WC from [facility],  van to transport pt home.    Melinda LANE,   Case Management   [phone]

## 2025-04-19 NOTE — DISCHARGE SUMMARY
[facility]  Orthopedics  Discharge Summary      Patient Name: Shlomo Wallace  MRN: [id number]  Admission Date: 4/17/2025  Hospital Length of Stay: 0 days  Discharge Date and Time: No discharge date for patient encounter.  Attending Physician: Marshal Manuel MD   Discharging Provider: CAROLINE Don MD  Primary Care Provider: Cindy Castillo MD    HPI:   Shlomo Wallace is a 54 y.o. male with no significant past medical history presenting with right knee pain.  The patient reports that today, while at work, he fell 3 ft out of a truck and felt his right leg give way.  He had immediate onset of pain that has been unable to bear weight since the time of injury.  The injury occurred at approximately 10:30 a.m..  Patient denies any head trauma or LOC. The patient denies prior hx of falls. Patient denies numbness and tingling to the RLE. Denies any other musculoskeletal pain or injuries. No known history of prior right knee injury or surgery.    Ambulates without assistance@ baseline   Tobacco Use: Lifetime nonsmoker  Denies IVDU   Lives with his mother in [place], [place]. No stairs to enter the home.    Anticoagulation:  None at baseline  Immunosuppressive medications:  None at baseline  Occupation:  He works for [employer]  No hx of MI, CVA, DVT/PE  No hx of cancer, chemotherapy, or radiation      Procedure(s) (LRB):  APPLICATION, EXTERNAL FIXATION DEVICE, LOWER EXTREMITY - RIGHT (Right)      Hospital Course:  On 04/17/2025, the patient presented to [facility] main campus with a right bicondylar tibial plateau fracture after falling out of his truck.  He is closed, neurovascularly intact distally.  Given his significant swelling in the concern that the patient would progress to compartment syndrome, the decision was made to take the patient emergently to the operating room for ex fix.  On postop day 0, the patient was monitored closely with serial compartment checks and, fortunately, his pain was  well-controlled without evidence of compartment syndrome.  On postoperative day 2, the patient's compartments remained swollen but easily compressible.  He was aggressively iced and elevated and today, postoperative day 2, his lateral compartment which was the most concerning of the time of injury is soft and easily compressible.  His pain has been well-controlled.  He is able to ambulate yesterday with physical therapy.  He is tolerating an oral diet since surgery.  We have been treating him with aspirin twice daily for DVT prophylaxis.  A CT scan of the right knee was obtained for preoperative planning.  The patient will be discharged to home with home health therapy and we will have him follow up in clinic this week to check his skin and swelling.  We will plan to proceed to the operating room for definitive fixation once his soft tissues are amenable.  He has already been consented for removal of external fixator and open reduction internal fixation of the right tibial plateau fracture and all other indicated procedures at the time of surgery.    Goals of Care Treatment Preferences:  Code Status: Full Code      Consults (From admission, onward)          Status Ordering Provider     Inpatient consult to Orthopedic Surgery  Once        Provider:  (Not yet assigned)    Completed JOSE ANTONIO BARKER            Pending Diagnostic Studies:       Procedure Component Value Units Date/Time    CT Knee Without Contrast Right [[id number]]     Order Status: Sent Lab Status: No result     EXTRA TUBES [[id number]]     Order Status: Sent Lab Status: No result     Specimen: Blood, Venous     Narrative:      The following orders were created for panel order EXTRA TUBES.  Procedure                               Abnormality         Status                     ---------                               -----------         ------                     Light Green Top Hold[[id number]]                                                       Leticia  Top Hold[[id number]]                                                          Gold Top Hold[[id number]]                                                                Please view results for these tests on the individual orders.    Gold Top Hold [[id number]]     Order Status: Sent Lab Status: No result     Specimen: Blood, Venous     Lavender Top Hold [[id number]]     Order Status: Sent Lab Status: No result     Specimen: Blood, Venous     Light Green Top Hold [[id number]]     Order Status: Sent Lab Status: No result     Specimen: Blood, Venous           Final Active Diagnoses:    Diagnosis Date Noted POA    PRINCIPAL PROBLEM:  Closed bicondylar fracture of right tibial plateau [S82.141A] 04/17/2025 Yes      Problems Resolved During this Admission:      Discharged Condition: good    Disposition: Home or Self Care    Follow Up: in clinic this week     Medications:  Reconciled Home Medications:      Medication List        START taking these medications      acetaminophen 500 MG tablet  Commonly known as: TYLENOL  Take 2 tablets (1,000 mg total) by mouth every 8 (eight) hours as needed for Pain.     aspirin 81 MG EC tablet  Commonly known as: ECOTRIN  Take 1 tablet (81 mg total) by mouth 2 (two) times a day.     celecoxib 200 MG capsule  Commonly known as: CeleBREX  Take 1 capsule (200 mg total) by mouth once daily.  Start taking on: April 20, 2025     methocarbamoL 500 MG Tab  Commonly known as: Robaxin  Take 1 tablet (500 mg total) by mouth 4 (four) times daily. for 14 days     ondansetron 4 MG tablet  Commonly known as: ZOFRAN  Take 2 tablets (8 mg total) by mouth every 6 (six) hours as needed for Nausea.     oxyCODONE 5 MG immediate release tablet  Commonly known as: ROXICODONE  Take 1 tablet (5 mg total) by mouth every 6 (six) hours as needed for Pain.     pantoprazole 40 MG tablet  Commonly known as: PROTONIX  Take 1 tablet (40 mg total) by mouth once daily.  Start taking on: April 20, 2025     polyethylene  "glycol 17 gram Pwpk  Commonly known as: GLYCOLAX  Take 17 g by mouth daily as needed for Constipation.            CONTINUE taking these medications      atorvastatin 10 MG tablet  Commonly known as: LIPITOR  Take 1 tablet (10 mg total) by mouth once daily.     blood sugar diagnostic Strp  To check BG 3 times daily, to use with insurance preferred meter     blood-glucose meter kit  To check BG 3 times daily with meals and dinner, to use with insurance preferred meter     DEXCOM G6 SENSOR Sonya  Generic drug: blood-glucose sensor  Use to check blood sugars twice daily     DEXCOM G6 TRANSMITTER Soyna  Generic drug: blood-glucose transmitter  Use to check blood sugar twice daily     lancets Misc  To check BG 3 times daily with meals and dinner, to use with insurance preferred meter     LEVEMIR FLEXTOUCH U100 INSULIN 100 unit/mL (3 mL) Inpn pen  Generic drug: insulin detemir U-100 (Levemir)  Inject 15 Units into the skin every evening.     metFORMIN 500 MG tablet  Commonly known as: GLUCOPHAGE  Take 1 tablet (500 mg total) by mouth daily with breakfast.     pen needle, diabetic 32 gauge x 5/32" Ndle  Use to give insulin daily              CAROLINE Don MD  Orthopedics  [facility] - Surgery    "

## 2025-04-19 NOTE — PLAN OF CARE
[facility]    HOME HEALTH ORDERS  FACE TO FACE ENCOUNTER    Patient Name: Shlomo Wallace  YOB: 1970    PCP: Cindy Castillo MD   PCP Address: [address] / [place] [address]  PCP Phone Number: [phone]  PCP Fax: [phone]    Encounter Date: 04/19/2025    Admit to Home Health    Diagnoses:  Active Hospital Problems    Diagnosis  POA    *Closed bicondylar fracture of right tibial plateau [S82.141A]  Yes      Resolved Hospital Problems   No resolved problems to display.       No future appointments.        I have seen and examined this patient face to face today. My clinical findings that support the need for the home health skilled services and home bound status are the following:  Medical restrictions requiring assistance of another human to leave home due to Post surgery monitoring.  Weakness/numbness causing balance and gait disturbance due to Surgery making it taxing to leave home.  Requiring assistive device to leave home due to unsteady gait caused by Surgery.    Allergies:Review of patient's allergies indicates:  No Known Allergies    Diet: regular diet    Activities:  Nonweightbearing to the right lower extremity.    Nursing:   SN to complete comprehensive assessment including routine vital signs. Instruct on disease process and s/s of complications to report to MD. Review/verify medication list sent home with the patient at time of discharge  and instruct patient/caregiver as needed. Frequency may be adjusted depending on start of care date. If patient has enteral feeding tube (NG, PEG, J-tube, G-tube), flush tube before and after feeding and/or medication administration with 20-30 mL of water.    Notify MD if SBP > 160 or < 90; DBP > 90 or < 50; HR > 120 or < 50; Temp > 101       CONSULTS:    Physical Therapy to evaluate and treat. Evaluate for home safety and equipment needs; Establish/upgrade home exercise program. Perform / instruct on therapeutic exercises,  "gait training, transfer training, and Range of Motion.  Occupational Therapy to evaluate and treat. Evaluate home environment for safety and equipment needs. Perform/Instruct on transfers, ADL training, ROM, and therapeutic exercises.  Aide to provide assistance with personal care, ADLs, and vital signs.    MISCELLANEOUS CARE:  N/A    WOUND CARE ORDERS  no      Medications: Review discharge medications with patient and family and provide education.      Current Discharge Medication List        CONTINUE these medications which have NOT CHANGED    Details   atorvastatin (LIPITOR) 10 MG tablet Take 1 tablet (10 mg total) by mouth once daily.  Qty: 90 tablet, Refills: 3      blood sugar diagnostic Strp To check BG 3 times daily, to use with insurance preferred meter  Qty: 200 each, Refills: 2      blood-glucose meter kit To check BG 3 times daily with meals and dinner, to use with insurance preferred meter  Qty: 1 each, Refills: 0      blood-glucose sensor (DEXCOM G6 SENSOR) Sonya Use to check blood sugars twice daily  Qty: 5 each, Refills: 3    Associated Diagnoses: Type 2 diabetes mellitus without complication, with long-term current use of insulin      blood-glucose transmitter (DEXCOM G6 TRANSMITTER) Sonya Use to check blood sugar twice daily  Qty: 1 each, Refills: 3    Associated Diagnoses: Type 2 diabetes mellitus without complication, with long-term current use of insulin      insulin detemir U-100 (LEVEMIR FLEXTOUCH U-100 INSULN) 100 unit/mL (3 mL) InPn pen Inject 15 Units into the skin every evening.  Qty: 6 each, Refills: 0      lancets Misc To check BG 3 times daily with meals and dinner, to use with insurance preferred meter  Qty: 100 each, Refills: 2      metFORMIN (GLUCOPHAGE) 500 MG tablet Take 1 tablet (500 mg total) by mouth daily with breakfast.  Qty: 30 tablet, Refills: 0      pen needle, diabetic 32 gauge x 5/32" Ndle Use to give insulin daily  Qty: 100 each, Refills: 2             I certify that this " patient is confined to his home and needs intermittent skilled nursing care, physical therapy, and occupational therapy.

## 2025-04-19 NOTE — PT/OT/SLP PROGRESS
Physical Therapy Treatment    Patient Name:  Shlomo Wallace   MRN:  [id number]    Recommendations:     Discharge Recommendations: High Intensity Therapy  Discharge Equipment Recommendations: walker, rolling  Barriers to discharge: Decreased caregiver support    Assessment:     Shlomo Wallace is a 54 y.o. male admitted with a medical diagnosis of Closed bicondylar fracture of right tibial plateau.  He presents with the following impairments/functional limitations: impaired endurance, impaired functional mobility, gait instability, decreased safety awareness, impaired balance .Pt tolerated treatment fairly well and is progressing slowly with mobility. pt limited due to pain. Patient remains appropriate for continued skilled services within the acute environment and goals remain appropriate.        Rehab Prognosis: Good; patient would benefit from acute skilled PT services to address these deficits and reach maximum level of function.    Recent Surgery: Procedure(s) (LRB):  APPLICATION, EXTERNAL FIXATION DEVICE, LOWER EXTREMITY - RIGHT (Right) 2 Days Post-Op    Plan:     During this hospitalization, patient to be seen daily to address the identified rehab impairments via gait training, therapeutic activities and progress toward the following goals:    Plan of Care Expires:  05/16/25    Subjective     Chief Complaint: pt c/o pain with mobility   Patient/Family Comments/goals:  to get better   Pain/Comfort:  Pain Rating 1: 4/10  Location - Side 1: Right  Location 1: leg  Pain Addressed 1: Distraction, Cessation of Activity, Reposition, Pre-medicate for activity  Pain Rating Post-Intervention 1: 8/10      Objective:     Communicated with RN prior to session.  Patient found HOB elevated with  (FCD; peripheral IV; telemetry; external fixator) upon PT entry to room.     General Precautions: Standard, fall  Orthopedic Precautions: RLE non weight bearing  Braces: N/A  Respiratory Status: Room air     Functional  Mobility:  Bed Mobility:     Rolling Right: stand by assistance  Supine to Sit: minimum assistance  Transfers:     Sit to Stand:  contact guard assistance with rolling walker  Gait: pt amb  2 ft with RW with Alfonso with NWB R LE. Pt limited due to increased pain upon standing       AM-PAC 6 CLICK MOBILITY  Turning over in bed (including adjusting bedclothes, sheets and blankets)?: 3  Sitting down on and standing up from a chair with arms (e.g., wheelchair, bedside commode, etc.): 3  Moving from lying on back to sitting on the side of the bed?: 2  Moving to and from a bed to a chair (including a wheelchair)?: 3  Need to walk in hospital room?: 3  Climbing 3-5 steps with a railing?: 1  Basic Mobility Total Score: 15       Treatment & Education:  Therapist provided instruction and educated of patient on progress, safety, d/c, PT POC,   proper body mechanics, energy conservation, and fall prevention strategies during tasks listed above, on the effects of prolonged immobility and the importance of performing OOB activity and exercises to promote healing and reduce recovery time     Updated white board with appropriate PT mobility information for medical team notification   Donned an extra gown   Bedside table in front of patient and area set up for function, convenience, and safety. RN aware of patient's mobility needs and status. Questions/concerns addressed within PTA scope of practice, patient with no further questions. Time was provided for active listening, discussion of health disposition, and discussion of safe discharge. Pt?verbalized?agreement   Due to pt complex medical condition, the skill of 2 licensed therapists is needed to maximize treatment session and progression towards goals   Patient left up in chair with call button in reach, RN  notified, mother  present, and BLE elevated and FCD o     GOALS:   Multidisciplinary Problems       Physical Therapy Goals          Problem: Physical Therapy    Goal Priority  Disciplines Outcome Interventions   Physical Therapy Goal     PT, PT/OT Progressing    Description: Goals to be met by: 25     Patient will increase functional independence with mobility by performin. Supine to sit with Contact Guard Assistance  2. Sit to stand transfer with Contact Guard Assistance with RW. NWB RLE  3. Gait  x 50 feet with Contact Guard Assistance using Rolling Walker NWB RLE                         Time Tracking:     PT Received On: 25  PT Start Time: 948     PT Stop Time: 1027  PT Total Time (min): 39 min     Billable Minutes: Therapeutic Activity 39    Treatment Type: Treatment  PT/PTA: PTA     Number of PTA visits since last PT visit: 2025

## 2025-04-20 VITALS
SYSTOLIC BLOOD PRESSURE: 209 MMHG | HEART RATE: 91 BPM | BODY MASS INDEX: 33.49 KG/M2 | DIASTOLIC BLOOD PRESSURE: 92 MMHG | HEIGHT: 76 IN | OXYGEN SATURATION: 92 % | RESPIRATION RATE: 18 BRPM | WEIGHT: 275 LBS | TEMPERATURE: 99 F

## 2025-04-20 PROCEDURE — 97530 THERAPEUTIC ACTIVITIES: CPT | Mod: CQ

## 2025-04-20 PROCEDURE — G0378 HOSPITAL OBSERVATION PER HR: HCPCS

## 2025-04-20 PROCEDURE — 25000003 PHARM REV CODE 250

## 2025-04-20 RX ADMIN — METHOCARBAMOL 500 MG: 500 TABLET ORAL at 09:04

## 2025-04-20 RX ADMIN — METHOCARBAMOL 500 MG: 500 TABLET ORAL at 01:04

## 2025-04-20 RX ADMIN — METHOCARBAMOL 500 MG: 500 TABLET ORAL at 04:04

## 2025-04-20 RX ADMIN — GABAPENTIN 300 MG: 300 CAPSULE ORAL at 09:04

## 2025-04-20 RX ADMIN — ASPIRIN 81 MG: 81 TABLET, COATED ORAL at 09:04

## 2025-04-20 RX ADMIN — PANTOPRAZOLE SODIUM 40 MG: 40 TABLET, DELAYED RELEASE ORAL at 09:04

## 2025-04-20 RX ADMIN — GABAPENTIN 300 MG: 300 CAPSULE ORAL at 01:04

## 2025-04-20 RX ADMIN — ACETAMINOPHEN 1000 MG: 500 TABLET ORAL at 01:04

## 2025-04-20 RX ADMIN — ACETAMINOPHEN 1000 MG: 500 TABLET ORAL at 09:04

## 2025-04-20 RX ADMIN — OXYCODONE 5 MG: 5 TABLET ORAL at 09:04

## 2025-04-20 RX ADMIN — CELECOXIB 200 MG: 200 CAPSULE ORAL at 09:04

## 2025-04-20 NOTE — SUBJECTIVE & OBJECTIVE
"Principal Problem:Closed bicondylar fracture of right tibial plateau    Principal Orthopedic Problem:  As above s/p ex fix of right bicondylar tibial plateau fracture on 04/17/2025.    Interval History:  No acute events overnight.  Afebrile, hemodynamically stable.  Pain is manageable.  Tolerating an oral diet.  He states that his swelling seems to have been improved.    Review of patient's allergies indicates:  No Known Allergies    Current Facility-Administered Medications   Medication    acetaminophen tablet 1,000 mg    aspirin EC tablet 81 mg    celecoxib capsule 200 mg    dextrose 50% injection 12.5 g    dextrose 50% injection 25 g    gabapentin capsule 300 mg    glucagon (human recombinant) injection 1 mg    glucose chewable tablet 16 g    glucose chewable tablet 24 g    HYDROmorphone (PF) injection 0.5 mg    methocarbamoL tablet 500 mg    naloxone 0.4 mg/mL injection 0.02 mg    ondansetron injection 4 mg    oxyCODONE immediate release tablet 5 mg    oxyCODONE immediate release tablet Tab 10 mg    pantoprazole EC tablet 40 mg    polyethylene glycol packet 17 g    prochlorperazine injection Soln 2.5 mg    senna-docusate 8.6-50 mg per tablet 1 tablet    sodium chloride 0.9% flush 10 mL     Objective:     Vital Signs (Most Recent):  Temp: 98.1 °F (36.7 °C) (04/20/25 0505)  Pulse: 88 (04/20/25 0505)  Resp: 16 (04/20/25 0505)  BP: (!) 158/93 (04/20/25 0505)  SpO2: 98 % (04/20/25 0505) Vital Signs (24h Range):  Temp:  [97.3 °F (36.3 °C)-99.3 °F (37.4 °C)] 98.1 °F (36.7 °C)  Pulse:  [] 88  Resp:  [16-18] 16  SpO2:  [94 %-100 %] 98 %  BP: (132-159)/(80-93) 158/93     Weight: 124.7 kg (275 lb)  Height: 6' 4" (193 cm)  Body mass index is 33.47 kg/m².      Intake/Output Summary (Last 24 hours) at 4/20/2025 0640  Last data filed at 4/20/2025 0607  Gross per 24 hour   Intake --   Output 750 ml   Net -750 ml        Ortho/SPM Exam  NAD, resting comfortably in bed  A&O x3   Breathing comfortably w/o distress "   Extremities WWP     RLE:   Knee spanning external fixators in place.    The patient is iced and elevated on a stack of blankets.    Moderate swelling of the proximal tibia that is much improved from prior.  The posterior, deep posterior, and anterior compartments are all soft and compressible.  2+ DP pulse.    No pain with passive stretch of the great toe.    Sensation intact to light touch throughout.     Significant Labs:   Recent Lab Results       None          All pertinent labs within the past 24 hours have been reviewed.    Significant Imaging: I have reviewed all pertinent imaging results/findings.

## 2025-04-20 NOTE — ASSESSMENT & PLAN NOTE
Shlomo Wallace is a 54 y.o. male with no significant past medical history who presents with a right bicondylar tibial plateau fracture.  He is closed, neurovascularly intact.  On initial presentation, the patient does not have compartment syndrome; however, given his moderate to severe swelling, we will plan to proceed to the operating room emergently for stabilization with an external fixator.  He last ate at 4:00 a.m. this morning.    Now s/p ex fix of right bicondylar tibial plateau fracture on 04/17/2025    NWB RLE  PT/OT   ASA 81 mg BID   Regular diet    Dispo:  Discharge to home today.  We will arrange outpatient follow up with the Orthopedic surgery this week.

## 2025-04-20 NOTE — PT/OT/SLP PROGRESS
Occupational Therapy      Patient Name:  Shlomo Wallace   MRN:  [id number]    Patient not seen today secondary to patient deferred due to anticipation of pain and awaiting discharge. Patient educated on importance of OOB activity and reviewed functional transfer training from previous OT note. Patient reported awaiting wheelchair delivery and had no further questions/concerns. He preferred to stay in bed. Will follow-up next scheduled visit if patient still admitted. [phone]. No units billed.     4/20/2025

## 2025-04-20 NOTE — PLAN OF CARE
"[facility]  Discharge Final Note    Primary Care Provider: Cindy Castillo MD    Expected Discharge Date: 4/20/2025    Final Discharge Note (most recent)       Final Note - 04/20/25 1721          Final Note    Assessment Type Final Discharge Note (P)      Anticipated Discharge Disposition ED Dismiss - Diverted Elsewhere (P)      What phone number can be called within the next 1-3 days to see how you are doing after discharge? [phone] (P)      Hospital Resources/Appts/Education Provided Provided patient/caregiver with written discharge plan information;Provided education on problems/symptoms using teachback;Appointments scheduled and added to AVS (P)         Post-Acute Status    Post-Acute Authorization HME (P)      HME Status Set-up Complete/Auth obtained (P)    Wheelchair/Bedside Commode    Discharge Delays None known at this time (P)                      Important Message from Medicare           Pt. discharging home with HME: wheelchair & bedside commode. Wheelchair delivered to bedside today. "Inaccessible Home & Decreased Caregiver Support" noted by Therapy Team. High Intensity. Wheelchair Van Transportation arranged via [facility].     Bety James LMSW       "

## 2025-04-20 NOTE — PLAN OF CARE
Pt. waiting on wheelchair delivery to bedside from [facility]. JEREMY consulting Dr. Don about Home Health as pt has High Intensity Recs from Therapy Team. Wheelchair van transportation arranged for pt to discharge home to [place].     Bety James LMSW

## 2025-04-20 NOTE — PROGRESS NOTES
[facility]  Orthopedics  Progress Note    Patient Name: Shlomo Wallace  MRN: [id number]  Admission Date: 4/17/2025  Hospital Length of Stay: 0 days  Attending Provider: Marshal Manuel MD  Primary Care Provider: Cindy Castillo MD  Follow-up For: Procedure(s) (LRB):  APPLICATION, EXTERNAL FIXATION DEVICE, LOWER EXTREMITY - RIGHT (Right)    Post-Operative Day: 3 Days Post-Op  Subjective:     Principal Problem:Closed bicondylar fracture of right tibial plateau    Principal Orthopedic Problem:  As above s/p ex fix of right bicondylar tibial plateau fracture on 04/17/2025.    Interval History:  No acute events overnight.  Afebrile, hemodynamically stable.  Pain is manageable.  Tolerating an oral diet.  He states that his swelling seems to have been improved.    Review of patient's allergies indicates:  No Known Allergies    Current Facility-Administered Medications   Medication    acetaminophen tablet 1,000 mg    aspirin EC tablet 81 mg    celecoxib capsule 200 mg    dextrose 50% injection 12.5 g    dextrose 50% injection 25 g    gabapentin capsule 300 mg    glucagon (human recombinant) injection 1 mg    glucose chewable tablet 16 g    glucose chewable tablet 24 g    HYDROmorphone (PF) injection 0.5 mg    methocarbamoL tablet 500 mg    naloxone 0.4 mg/mL injection 0.02 mg    ondansetron injection 4 mg    oxyCODONE immediate release tablet 5 mg    oxyCODONE immediate release tablet Tab 10 mg    pantoprazole EC tablet 40 mg    polyethylene glycol packet 17 g    prochlorperazine injection Soln 2.5 mg    senna-docusate 8.6-50 mg per tablet 1 tablet    sodium chloride 0.9% flush 10 mL     Objective:     Vital Signs (Most Recent):  Temp: 98.1 °F (36.7 °C) (04/20/25 0505)  Pulse: 88 (04/20/25 0505)  Resp: 16 (04/20/25 0505)  BP: (!) 158/93 (04/20/25 0505)  SpO2: 98 % (04/20/25 0505) Vital Signs (24h Range):  Temp:  [97.3 °F (36.3 °C)-99.3 °F (37.4 °C)] 98.1 °F (36.7 °C)  Pulse:  [] 88  Resp:  [16-18]  "16  SpO2:  [94 %-100 %] 98 %  BP: (132-159)/(80-93) 158/93     Weight: 124.7 kg (275 lb)  Height: 6' 4" (193 cm)  Body mass index is 33.47 kg/m².      Intake/Output Summary (Last 24 hours) at 4/20/2025 0640  Last data filed at 4/20/2025 0607  Gross per 24 hour   Intake --   Output 750 ml   Net -750 ml        Ortho/SPM Exam  NAD, resting comfortably in bed  A&O x3   Breathing comfortably w/o distress   Extremities WWP     RLE:   Knee spanning external fixators in place.    The patient is iced and elevated on a stack of blankets.    Moderate swelling of the proximal tibia that is much improved from prior.  The posterior, deep posterior, and anterior compartments are all soft and compressible.  2+ DP pulse.    No pain with passive stretch of the great toe.    Sensation intact to light touch throughout.     Significant Labs:   Recent Lab Results       None          All pertinent labs within the past 24 hours have been reviewed.    Significant Imaging: I have reviewed all pertinent imaging results/findings.  Assessment/Plan:     * Closed bicondylar fracture of right tibial plateau  Shlomo Wallace is a 54 y.o. male with no significant past medical history who presents with a right bicondylar tibial plateau fracture.  He is closed, neurovascularly intact.  On initial presentation, the patient does not have compartment syndrome; however, given his moderate to severe swelling, we will plan to proceed to the operating room emergently for stabilization with an external fixator.  He last ate at 4:00 a.m. this morning.    Now s/p ex fix of right bicondylar tibial plateau fracture on 04/17/2025    NWB RLE  PT/OT   ASA 81 mg BID   Regular diet    Dispo:  Discharge to home today.  We will arrange outpatient follow up with the Orthopedic surgery this week.              CAROLINE Don MD  Orthopedics  [facility] - Surgery    "

## 2025-04-20 NOTE — PT/OT/SLP PROGRESS
Physical Therapy Treatment    Patient Name:  Shlomo Wallace   MRN:  [id number]    Recommendations:     Discharge Recommendations: High Intensity Therapy  Discharge Equipment Recommendations: bedside commode, walker, rolling, wheelchair  Barriers to discharge: Inaccessible home and Decreased caregiver support    Assessment:     Shlomo Wallace is a 54 y.o. male admitted with a medical diagnosis of Closed bicondylar fracture of right tibial plateau.  He presents with the following impairments/functional limitations: weakness, impaired endurance, impaired self care skills, impaired functional mobility, gait instability, pain, decreased lower extremity function, impaired skin, edema, decreased ROM, orthopedic precautions, decreased coordination . Patient and mother educated on w/c management and transfer from bed to wheelchair. Patient was initially anxious due to fear of pain when transferring.    Rehab Prognosis: Good and Fair; patient would benefit from acute skilled PT services to address these deficits and reach maximum level of function.    Recent Surgery: Procedure(s) (LRB):  APPLICATION, EXTERNAL FIXATION DEVICE, LOWER EXTREMITY - RIGHT (Right) 3 Days Post-Op    Plan:     During this hospitalization, patient to be seen daily to address the identified rehab impairments via gait training, therapeutic activities and progress toward the following goals:    Plan of Care Expires:  05/16/25    Subjective     Chief Complaint: pain only when in standing  Patient/Family Comments/goals: to go home today.  Pain/Comfort:  Pain Rating 1: 2/10  Location - Side 1: Right  Location - Orientation 1: lower  Location 1: leg  Pain Addressed 1: Pre-medicate for activity, Reposition, Distraction, Cessation of Activity  Pain Rating Post-Intervention 1: 4/10      Objective:     Communicated with NSG prior to session.  Patient found HOB elevated with external fixator upon PT entry to room.     General Precautions: Standard,  fall  Orthopedic Precautions: RLE non weight bearing  Braces: N/A  Respiratory Status: Room air     Functional Mobility:  Bed Mobility:     Scooting: minimum assistance  Supine to Sit: minimum assistance  Transfers:     Bed to Chair: minimum assistance with  no AD  using  Scoot Pivot      AM-PAC 6 CLICK MOBILITY  Turning over in bed (including adjusting bedclothes, sheets and blankets)?: 4  Sitting down on and standing up from a chair with arms (e.g., wheelchair, bedside commode, etc.): 3  Moving from lying on back to sitting on the side of the bed?: 3  Moving to and from a bed to a chair (including a wheelchair)?: 3  Need to walk in hospital room?: 2  Climbing 3-5 steps with a railing?: 1  Basic Mobility Total Score: 16       Treatment & Education:  Donned a second gown. Educated on basic wheel-chair management and rationale of transferring to the stronger side. Patient transferred to wheelchair using mod cues and supporting the R LE.    Patient left  On Wheelchair  with  mother present.    GOALS:   Multidisciplinary Problems       Physical Therapy Goals          Problem: Physical Therapy    Goal Priority Disciplines Outcome Interventions   Physical Therapy Goal     PT, PT/OT Progressing    Description: Goals to be met by: 25     Patient will increase functional independence with mobility by performin. Supine to sit with Contact Guard Assistance  2. Sit to stand transfer with Contact Guard Assistance with RW. NWB RLE  3. Gait  x 50 feet with Contact Guard Assistance using Rolling Walker NWB RLE                         DME Justifications:   Shlomo's mobility limitation cannot be sufficiently resolved by the use of a cane. His functional mobility deficit can be sufficiently resolved with the use of a Rolling Walker. Patient's mobility limitation significantly impairs their ability to participate in one of more activities of daily living.  The use of a RW will significantly improve the patient's ability  to participate in MRADLS and the patient will use it on regular basis in the home.    Time Tracking:     PT Received On: 04/20/25  PT Start Time: 1530     PT Stop Time: 1550  PT Total Time (min): 20 min     Billable Minutes: Therapeutic Activity 20    Treatment Type: Treatment  PT/PTA: PTA     Number of PTA visits since last PT visit: 2     04/20/2025

## 2025-04-23 PROCEDURE — G0180 MD CERTIFICATION HHA PATIENT: HCPCS | Mod: ,,, | Performed by: STUDENT IN AN ORGANIZED HEALTH CARE EDUCATION/TRAINING PROGRAM

## 2025-04-24 ENCOUNTER — OFFICE VISIT (OUTPATIENT)
Dept: ORTHOPEDICS | Facility: CLINIC | Age: 55
End: 2025-04-24
Payer: COMMERCIAL

## 2025-04-24 VITALS
RESPIRATION RATE: 18 BRPM | DIASTOLIC BLOOD PRESSURE: 93 MMHG | HEIGHT: 76 IN | BODY MASS INDEX: 33.48 KG/M2 | SYSTOLIC BLOOD PRESSURE: 123 MMHG | WEIGHT: 274.94 LBS | HEART RATE: 121 BPM

## 2025-04-24 DIAGNOSIS — S82.141A CLOSED BICONDYLAR FRACTURE OF RIGHT TIBIAL PLATEAU: Primary | ICD-10-CM

## 2025-04-24 PROCEDURE — 3074F SYST BP LT 130 MM HG: CPT | Mod: CPTII,S$GLB,, | Performed by: PHYSICIAN ASSISTANT

## 2025-04-24 PROCEDURE — 99999 PR PBB SHADOW E&M-EST. PATIENT-LVL V: CPT | Mod: PBBFAC,,, | Performed by: PHYSICIAN ASSISTANT

## 2025-04-24 PROCEDURE — 3080F DIAST BP >= 90 MM HG: CPT | Mod: CPTII,S$GLB,, | Performed by: PHYSICIAN ASSISTANT

## 2025-04-24 PROCEDURE — 3051F HG A1C>EQUAL 7.0%<8.0%: CPT | Mod: CPTII,S$GLB,, | Performed by: PHYSICIAN ASSISTANT

## 2025-04-24 PROCEDURE — 99024 POSTOP FOLLOW-UP VISIT: CPT | Mod: S$GLB,,, | Performed by: PHYSICIAN ASSISTANT

## 2025-04-24 PROCEDURE — 1159F MED LIST DOCD IN RCRD: CPT | Mod: CPTII,S$GLB,, | Performed by: PHYSICIAN ASSISTANT

## 2025-04-24 RX ORDER — HYDROCODONE BITARTRATE AND ACETAMINOPHEN 5; 325 MG/1; MG/1
1 TABLET ORAL
Qty: 42 TABLET | Refills: 0 | Status: SHIPPED | OUTPATIENT
Start: 2025-04-24

## 2025-04-24 RX ORDER — GABAPENTIN 100 MG/1
100 CAPSULE ORAL 3 TIMES DAILY
Qty: 90 CAPSULE | Refills: 11 | Status: SHIPPED | OUTPATIENT
Start: 2025-04-24 | End: 2026-04-24

## 2025-04-24 RX ORDER — SODIUM CHLORIDE 9 MG/ML
INJECTION, SOLUTION INTRAVENOUS CONTINUOUS
OUTPATIENT
Start: 2025-04-24

## 2025-04-24 RX ORDER — MUPIROCIN 20 MG/G
OINTMENT TOPICAL
OUTPATIENT
Start: 2025-04-24

## 2025-04-24 RX ORDER — CLINDAMYCIN PHOSPHATE 900 MG/50ML
900 INJECTION, SOLUTION INTRAVENOUS
OUTPATIENT
Start: 2025-04-24

## 2025-04-24 NOTE — PROGRESS NOTES
"Principal Orthopedic Problem: right bicondylar tibial plateau fracture      Relevant Medical History: according to chart    PMHX: DM    Ambulates without assistance@ baseline   Tobacco Use: Lifetime nonsmoker  Denies BOYD   Lives with his mother in [place], [place]. No stairs to enter the home.    Anticoagulation:  None at baseline  Immunosuppressive medications:  None at baseline  Occupation:  He works for [employer]  No hx of MI, CVA, DVT/PE  No hx of cancer, chemotherapy, or radiation        Lab Results   Component Value Date    HGBA1C 7.6 (H) 04/17/2025     Estimated body mass index is 33.47 kg/m² as calculated from the following:    Height as of 4/17/25: 6' 4" (1.93 m).    Weight as of 4/17/25: 124.7 kg (275 lb).      HPI:   54 year old male, fall 3 feet out of a truck while at work on 04/19/25  RADS: Right bicondylar tibial plateau fracture     04/17/25: :   Spanning external fixation right bicondylar tibial plateau fracture - 20690  Closed treatment of right bicondylar tibial plateau fracture with manipulation under anesthesia     Mr. Wallace is here today for a post-operative visit    Interval History:  he reports that he is doing ok.   he is at home . he is  participating in PT/OT. Home health   He is getting pin site cleaning. He is elevating and icing his knee.   Pain is controlled somewhat.  he is  taking pain medication.  Reports oxycodone is not helping at all.   he denies fever, chills, and sweats .     Physical exam:    Patient arrives to exam room: wheelchair.  Patient is  accompanied    Pin sites is clean, dry and intact.    Healing well no signs of breakdown or infection. Skin does minimal wrinkling on medial side.     RADS: All pertinent images were reviewed by myself:   none done today    Assessment:  Post-op visit ( 1 weeks)    Plan:  Current care, treatment plan, precautions, activity level/ modifications, limitations, rehabilitation exercises and proposed future treatment were discussed " with the patient. We discussed the need to monitor for changes in symptoms and condition and report them to the physician.  Discussed importance of compliance with all appointments and follow up examinations.     WOUND CARE :  - daily pin site cleaning.   -Patient was advised to monitor wound closely and multiple times daily for any problems. Call clinic immediately or report to ED for immediate medical attention for any complications including reopening of wound, drainage, purulence, redness, streaking, odor, pain out of proportion, fever, chills, etc.       ACTIVITY:   - light  -range of motion as tolerated all parts not in the external fixation device, encouraged range of motion of ankle to prevent euqanis.    - NWB      -PT/OT, home health , Patient is responsible to establish and continue care      PAIN MEDICATION:   - Multimodal pain control  - Pain medication: refill was needed, d/c oxycodone and tylenol and changed to Norco   - Pain medication refill policy provided to patient for review, yes.    - Patient was informed a multi-modal approach is used to treat their pain. With the goal to get off of narcotic pain medication and discontinue as soon as possible.   - ice and elevation to reduce pain and swelling     DVT PROPHYLAXIS:   - ASA 81 mg bid    FOLLOW UP:   - Patient will follow up in the clinic in 1 weeks, skin check and NPO with plans for surgery that day if skin is ready.       If there are any questions prior to scheduled follow up, the patient was instructed to contact the office    Discussed treatment options with patient. Operative vs non-operative treatment discussed with patient. Recommend operative intervention. Patient agreed.     - rest ice and elevation to reduce swelling.    Discussed proper and consistent elevation of extremities, above the level of the heart, while at rest, to help control/improve edema and decrease pain.  - NWB   - consents signed,    - lab, chest x-ray and EKG ordered   previously , results in chart  - aspirin  taking blood thinners       -Discussed COVID19 with the patient, they are aware of our current policies and procedures, were given the option of delaying surgery, and they elect to proceed.      Pre, charu, and post operative procedure and expectations were discussed.  Questions were answered. The patient has been educated and is ready to proceed with surgery.  Approximately 30 minutes was spent discussing surgical outcomes, plans, procedures, pre, charu, and post operative expectations and care. The risks, benefits and alternatives to surgery were discussed with the patient at great length.  These include bleeding, infection, vessel/nerve damage, pain, numbness, tingling, complex regional pain syndrome, hardware/surgical failure, need for further surgery, malunion, nonunion, DVT, PE, arthritis and death. He also understands that the risks of surgery may be greater for some patients due to health or lifestyle issues, such as a current condition or a history of heart disease, obesity, clotting disorders, recurrent infections, smoking, sedentary lifestyle, or noncompliance with medications, therapy, or followup. The degree of the increased risk is hard to estimate with any degree of precision.  Patient states an understanding and wishes to proceed with surgery.   All questions were answered.  No guarantees were implied or stated.  Informed consent was obtained  The patient will contact us if the have any questions, concerns, and changes in their medical condition prior to surgery.

## 2025-04-25 NOTE — H&P (VIEW-ONLY)
"Subjective:      Patient ID: Shlomo Wallace is a 54 y.o. male.    Chief Complaint: Injury, Pain, and Swelling of the Right Lower Leg (Right tibia)    Principal Orthopedic Problem: right bicondylar tibial plateau fracture      Relevant Medical History: according to chart    PMHX: DM    Ambulates without assistance@ baseline   Tobacco Use: Lifetime nonsmoker  Denies IVDU   Lives with his mother in [place], [place]. No stairs to enter the home.    Anticoagulation:  None at baseline  Immunosuppressive medications:  None at baseline  Occupation:  He works for [employer]  No hx of MI, CVA, DVT/PE  No hx of cancer, chemotherapy, or radiation        Lab Results   Component Value Date    HGBA1C 7.6 (H) 04/17/2025     Estimated body mass index is 33.47 kg/m² as calculated from the following:    Height as of 4/17/25: 6' 4" (1.93 m).    Weight as of 4/17/25: 124.7 kg (275 lb).      HPI:   54 year old male, fall 3 feet out of a truck while at work on 04/19/25  RADS: Right bicondylar tibial plateau fracture     04/17/25: :   Spanning external fixation right bicondylar tibial plateau fracture - 20690  Closed treatment of right bicondylar tibial plateau fracture with manipulation under anesthesia     Past Medical History:   Diagnosis Date    Diabetes mellitus, type 2      Past Surgical History:   Procedure Laterality Date    APPLICATION, EXTERNAL FIXATION DEVICE, LOWER EXTREMITY Right 4/17/2025    Procedure: APPLICATION, EXTERNAL FIXATION DEVICE, LOWER EXTREMITY - RIGHT;  Surgeon: Marshal Manuel MD;  Location: [facility] OR [address] [place] [address];  Service: Orthopedics;  Laterality: Right;     Social History     Occupational History    Not on file   Tobacco Use    Smoking status: Never    Smokeless tobacco: Never   Substance and Sexual Activity    Alcohol use: Yes     Comment: occasional    Drug use: Never    Sexual activity: Not on file      ROS  Medications Ordered Prior to Encounter[1]  Review of patient's allergies indicates:  No " "Known Allergies      Objective:      Vitals:    04/24/25 0744   BP: (!) 123/93   Pulse: (!) 121   Resp: 18   Weight: 124.7 kg (274 lb 14.6 oz)   Height: 6' 4" (1.93 m)     Ortho Exam     Gen: WD, WN in NAD   HEENT: NC/AT   Heart: RR   Resp: unlabored breathing   Skin: intact, no lesions pertinent to the surgery site    Assessment:       1. Closed bicondylar fracture of right tibial plateau          Plan:       Surgical intervention per .            [1]   Current Outpatient Medications on File Prior to Visit   Medication Sig Dispense Refill    acetaminophen (TYLENOL) 500 MG tablet Take 2 tablets (1,000 mg total) by mouth every 8 (eight) hours as needed for Pain. 84 tablet 0    aspirin (ECOTRIN) 81 MG EC tablet Take 1 tablet (81 mg total) by mouth 2 (two) times a day. 120 tablet 0    blood sugar diagnostic Strp To check BG 3 times daily, to use with insurance preferred meter 200 each 2    blood-glucose sensor (DEXCOM G6 SENSOR) Sonya Use to check blood sugars twice daily 5 each 3    blood-glucose transmitter (DEXCOM G6 TRANSMITTER) Sonya Use to check blood sugar twice daily 1 each 3    celecoxib (CELEBREX) 200 MG capsule Take 1 capsule (200 mg total) by mouth once daily. 14 capsule 0    lancets Misc To check BG 3 times daily with meals and dinner, to use with insurance preferred meter 100 each 2    methocarbamoL (ROBAXIN) 500 MG Tab Take 1 tablet (500 mg total) by mouth 4 (four) times daily. for 14 days 56 tablet 0    ondansetron (ZOFRAN) 4 MG tablet Take 2 tablets (8 mg total) by mouth every 6 (six) hours as needed for Nausea. 28 tablet 0    pantoprazole (PROTONIX) 40 MG tablet Take 1 tablet (40 mg total) by mouth once daily. 30 tablet 0    pen needle, diabetic 32 gauge x 5/32" Ndle Use to give insulin daily 100 each 2    polyethylene glycol (GLYCOLAX) 17 gram/dose powder Use cap to measure 17g, mix with liquid, and drink by mouth daily as needed for Constipation. 510 g 0    atorvastatin (LIPITOR) 10 MG " tablet Take 1 tablet (10 mg total) by mouth once daily. 90 tablet 3    blood-glucose meter kit To check BG 3 times daily with meals and dinner, to use with insurance preferred meter 1 each 0    insulin detemir U-100 (LEVEMIR FLEXTOUCH U-100 INSULN) 100 unit/mL (3 mL) InPn pen Inject 15 Units into the skin every evening. 6 each 0    metFORMIN (GLUCOPHAGE) 500 MG tablet Take 1 tablet (500 mg total) by mouth daily with breakfast. 30 tablet 0     No current facility-administered medications on file prior to visit.

## 2025-04-25 NOTE — H&P
"Subjective:      Patient ID: Shlomo Wallace is a 54 y.o. male.    Chief Complaint: Injury, Pain, and Swelling of the Right Lower Leg (Right tibia)    Principal Orthopedic Problem: right bicondylar tibial plateau fracture      Relevant Medical History: according to chart    PMHX: DM    Ambulates without assistance@ baseline   Tobacco Use: Lifetime nonsmoker  Denies IVDU   Lives with his mother in [place], [place]. No stairs to enter the home.    Anticoagulation:  None at baseline  Immunosuppressive medications:  None at baseline  Occupation:  He works for [employer]  No hx of MI, CVA, DVT/PE  No hx of cancer, chemotherapy, or radiation        Lab Results   Component Value Date    HGBA1C 7.6 (H) 04/17/2025     Estimated body mass index is 33.47 kg/m² as calculated from the following:    Height as of 4/17/25: 6' 4" (1.93 m).    Weight as of 4/17/25: 124.7 kg (275 lb).      HPI:   54 year old male, fall 3 feet out of a truck while at work on 04/19/25  RADS: Right bicondylar tibial plateau fracture     04/17/25: :   Spanning external fixation right bicondylar tibial plateau fracture - 20690  Closed treatment of right bicondylar tibial plateau fracture with manipulation under anesthesia     Past Medical History:   Diagnosis Date    Diabetes mellitus, type 2      Past Surgical History:   Procedure Laterality Date    APPLICATION, EXTERNAL FIXATION DEVICE, LOWER EXTREMITY Right 4/17/2025    Procedure: APPLICATION, EXTERNAL FIXATION DEVICE, LOWER EXTREMITY - RIGHT;  Surgeon: Marshal Manuel MD;  Location: [facility] OR [address] [place] [address];  Service: Orthopedics;  Laterality: Right;     Social History     Occupational History    Not on file   Tobacco Use    Smoking status: Never    Smokeless tobacco: Never   Substance and Sexual Activity    Alcohol use: Yes     Comment: occasional    Drug use: Never    Sexual activity: Not on file      ROS  Medications Ordered Prior to Encounter[1]  Review of patient's allergies indicates:  No " "Known Allergies      Objective:      Vitals:    04/24/25 0744   BP: (!) 123/93   Pulse: (!) 121   Resp: 18   Weight: 124.7 kg (274 lb 14.6 oz)   Height: 6' 4" (1.93 m)     Ortho Exam     Gen: WD, WN in NAD   HEENT: NC/AT   Heart: RR   Resp: unlabored breathing   Skin: intact, no lesions pertinent to the surgery site    Assessment:       1. Closed bicondylar fracture of right tibial plateau          Plan:       Surgical intervention per .            [1]   Current Outpatient Medications on File Prior to Visit   Medication Sig Dispense Refill    acetaminophen (TYLENOL) 500 MG tablet Take 2 tablets (1,000 mg total) by mouth every 8 (eight) hours as needed for Pain. 84 tablet 0    aspirin (ECOTRIN) 81 MG EC tablet Take 1 tablet (81 mg total) by mouth 2 (two) times a day. 120 tablet 0    blood sugar diagnostic Strp To check BG 3 times daily, to use with insurance preferred meter 200 each 2    blood-glucose sensor (DEXCOM G6 SENSOR) Sonya Use to check blood sugars twice daily 5 each 3    blood-glucose transmitter (DEXCOM G6 TRANSMITTER) Sonya Use to check blood sugar twice daily 1 each 3    celecoxib (CELEBREX) 200 MG capsule Take 1 capsule (200 mg total) by mouth once daily. 14 capsule 0    lancets Misc To check BG 3 times daily with meals and dinner, to use with insurance preferred meter 100 each 2    methocarbamoL (ROBAXIN) 500 MG Tab Take 1 tablet (500 mg total) by mouth 4 (four) times daily. for 14 days 56 tablet 0    ondansetron (ZOFRAN) 4 MG tablet Take 2 tablets (8 mg total) by mouth every 6 (six) hours as needed for Nausea. 28 tablet 0    pantoprazole (PROTONIX) 40 MG tablet Take 1 tablet (40 mg total) by mouth once daily. 30 tablet 0    pen needle, diabetic 32 gauge x 5/32" Ndle Use to give insulin daily 100 each 2    polyethylene glycol (GLYCOLAX) 17 gram/dose powder Use cap to measure 17g, mix with liquid, and drink by mouth daily as needed for Constipation. 510 g 0    atorvastatin (LIPITOR) 10 MG " tablet Take 1 tablet (10 mg total) by mouth once daily. 90 tablet 3    blood-glucose meter kit To check BG 3 times daily with meals and dinner, to use with insurance preferred meter 1 each 0    insulin detemir U-100 (LEVEMIR FLEXTOUCH U-100 INSULN) 100 unit/mL (3 mL) InPn pen Inject 15 Units into the skin every evening. 6 each 0    metFORMIN (GLUCOPHAGE) 500 MG tablet Take 1 tablet (500 mg total) by mouth daily with breakfast. 30 tablet 0     No current facility-administered medications on file prior to visit.

## 2025-04-28 ENCOUNTER — PATIENT MESSAGE (OUTPATIENT)
Dept: ORTHOPEDICS | Facility: CLINIC | Age: 55
End: 2025-04-28
Payer: COMMERCIAL

## 2025-04-28 ENCOUNTER — ANESTHESIA EVENT (OUTPATIENT)
Dept: SURGERY | Facility: HOSPITAL | Age: 55
End: 2025-04-28
Payer: OTHER MISCELLANEOUS

## 2025-04-28 ENCOUNTER — TELEPHONE (OUTPATIENT)
Dept: ORTHOPEDICS | Facility: CLINIC | Age: 55
End: 2025-04-28
Payer: COMMERCIAL

## 2025-04-28 NOTE — TELEPHONE ENCOUNTER
Pt will arrive at 7am so see Mary Pinedo for a skin check then go down to the surgery center after skin check     Hello,     I just want to let you know your surgery time of arrival on 4/29/25.     Dr. Manuel needs you to arrive at 7 am at the [facility] 5th Floor  ([address] [place] [id number]).     Please remember that there is no eating or drinking after midnight on 4/28/25.  Please remember that there is no eating or drinking the morning of surgery 4/29/25.     Please remember to wash the night and the morning of surgery with the Hibiclens that was provided or dial soap.

## 2025-04-29 ENCOUNTER — HOSPITAL ENCOUNTER (INPATIENT)
Facility: HOSPITAL | Age: 55
LOS: 1 days | Discharge: HOME HEALTH | DRG: 494 | End: 2025-05-01
Attending: ORTHOPAEDIC SURGERY | Admitting: ORTHOPAEDIC SURGERY
Payer: OTHER MISCELLANEOUS

## 2025-04-29 ENCOUNTER — OFFICE VISIT (OUTPATIENT)
Dept: ORTHOPEDICS | Facility: CLINIC | Age: 55
End: 2025-04-29
Payer: COMMERCIAL

## 2025-04-29 ENCOUNTER — ANESTHESIA (OUTPATIENT)
Dept: SURGERY | Facility: HOSPITAL | Age: 55
End: 2025-04-29
Payer: OTHER MISCELLANEOUS

## 2025-04-29 DIAGNOSIS — S82.151A CLOSED DISPLACED FRACTURE OF RIGHT TIBIAL TUBEROSITY, INITIAL ENCOUNTER: Primary | ICD-10-CM

## 2025-04-29 DIAGNOSIS — S82.141A CLOSED BICONDYLAR FRACTURE OF RIGHT TIBIAL PLATEAU: ICD-10-CM

## 2025-04-29 DIAGNOSIS — S82.141A CLOSED BICONDYLAR FRACTURE OF RIGHT TIBIAL PLATEAU: Primary | ICD-10-CM

## 2025-04-29 LAB
POCT GLUCOSE: 219 MG/DL (ref 70–110)
POCT GLUCOSE: 270 MG/DL (ref 70–110)
POCT GLUCOSE: 312 MG/DL (ref 70–110)
POCT GLUCOSE: 317 MG/DL (ref 70–110)

## 2025-04-29 PROCEDURE — 63600175 PHARM REV CODE 636 W HCPCS

## 2025-04-29 PROCEDURE — 0QSG04Z REPOSITION RIGHT TIBIA WITH INTERNAL FIXATION DEVICE, OPEN APPROACH: ICD-10-PCS | Performed by: ORTHOPAEDIC SURGERY

## 2025-04-29 PROCEDURE — C1769 GUIDE WIRE: HCPCS | Performed by: ORTHOPAEDIC SURGERY

## 2025-04-29 PROCEDURE — 2W1LX6Z COMPRESSION OF RIGHT LOWER EXTREMITY USING PRESSURE DRESSING: ICD-10-PCS | Performed by: ORTHOPAEDIC SURGERY

## 2025-04-29 PROCEDURE — 25000003 PHARM REV CODE 250: Performed by: NURSE ANESTHETIST, CERTIFIED REGISTERED

## 2025-04-29 PROCEDURE — 63600175 PHARM REV CODE 636 W HCPCS: Performed by: PHYSICIAN ASSISTANT

## 2025-04-29 PROCEDURE — 71000015 HC POSTOP RECOV 1ST HR: Performed by: ORTHOPAEDIC SURGERY

## 2025-04-29 PROCEDURE — 63600175 PHARM REV CODE 636 W HCPCS: Performed by: ORTHOPAEDIC SURGERY

## 2025-04-29 PROCEDURE — 63600175 PHARM REV CODE 636 W HCPCS: Performed by: NURSE ANESTHETIST, CERTIFIED REGISTERED

## 2025-04-29 PROCEDURE — 37000008 HC ANESTHESIA 1ST 15 MINUTES: Performed by: ORTHOPAEDIC SURGERY

## 2025-04-29 PROCEDURE — 36000710: Performed by: ORTHOPAEDIC SURGERY

## 2025-04-29 PROCEDURE — 27201423 OPTIME MED/SURG SUP & DEVICES STERILE SUPPLY: Performed by: ORTHOPAEDIC SURGERY

## 2025-04-29 PROCEDURE — 63600175 PHARM REV CODE 636 W HCPCS: Performed by: ANESTHESIOLOGY

## 2025-04-29 PROCEDURE — 82962 GLUCOSE BLOOD TEST: CPT | Performed by: ORTHOPAEDIC SURGERY

## 2025-04-29 PROCEDURE — 64448 NJX AA&/STRD FEM NRV NFS IMG: CPT

## 2025-04-29 PROCEDURE — 99999 PR PBB SHADOW E&M-EST. PATIENT-LVL III: CPT | Mod: PBBFAC,,, | Performed by: PHYSICIAN ASSISTANT

## 2025-04-29 PROCEDURE — 94761 N-INVAS EAR/PLS OXIMETRY MLT: CPT

## 2025-04-29 PROCEDURE — 0QPG35Z REMOVAL OF EXTERNAL FIXATION DEVICE FROM RIGHT TIBIA, PERCUTANEOUS APPROACH: ICD-10-PCS | Performed by: ORTHOPAEDIC SURGERY

## 2025-04-29 PROCEDURE — 63600175 PHARM REV CODE 636 W HCPCS: Performed by: STUDENT IN AN ORGANIZED HEALTH CARE EDUCATION/TRAINING PROGRAM

## 2025-04-29 PROCEDURE — 25000003 PHARM REV CODE 250: Performed by: PHYSICIAN ASSISTANT

## 2025-04-29 PROCEDURE — 25000003 PHARM REV CODE 250

## 2025-04-29 PROCEDURE — C1713 ANCHOR/SCREW BN/BN,TIS/BN: HCPCS | Performed by: ORTHOPAEDIC SURGERY

## 2025-04-29 PROCEDURE — 0QUG0JZ SUPPLEMENT RIGHT TIBIA WITH SYNTHETIC SUBSTITUTE, OPEN APPROACH: ICD-10-PCS | Performed by: ORTHOPAEDIC SURGERY

## 2025-04-29 PROCEDURE — 27536 TREAT KNEE FRACTURE: CPT | Mod: 58,22,RT, | Performed by: ORTHOPAEDIC SURGERY

## 2025-04-29 PROCEDURE — 25000003 PHARM REV CODE 250: Performed by: STUDENT IN AN ORGANIZED HEALTH CARE EDUCATION/TRAINING PROGRAM

## 2025-04-29 PROCEDURE — 71000033 HC RECOVERY, INTIAL HOUR: Performed by: ORTHOPAEDIC SURGERY

## 2025-04-29 PROCEDURE — 27540 TREAT KNEE FRACTURE: CPT | Mod: 58,51,RT, | Performed by: ORTHOPAEDIC SURGERY

## 2025-04-29 PROCEDURE — 27800903 OPTIME MED/SURG SUP & DEVICES OTHER IMPLANTS: Performed by: ORTHOPAEDIC SURGERY

## 2025-04-29 PROCEDURE — 36000711: Performed by: ORTHOPAEDIC SURGERY

## 2025-04-29 PROCEDURE — 20694 RMVL EXT FIXJ SYS UNDER ANES: CPT | Mod: 58,,, | Performed by: ORTHOPAEDIC SURGERY

## 2025-04-29 PROCEDURE — 37000009 HC ANESTHESIA EA ADD 15 MINS: Performed by: ORTHOPAEDIC SURGERY

## 2025-04-29 PROCEDURE — 64448 NJX AA&/STRD FEM NRV NFS IMG: CPT | Mod: 59,RT,, | Performed by: ANESTHESIOLOGY

## 2025-04-29 PROCEDURE — 0QPB35Z REMOVAL OF EXTERNAL FIXATION DEVICE FROM RIGHT LOWER FEMUR, PERCUTANEOUS APPROACH: ICD-10-PCS | Performed by: ORTHOPAEDIC SURGERY

## 2025-04-29 PROCEDURE — 71000016 HC POSTOP RECOV ADDL HR: Performed by: ORTHOPAEDIC SURGERY

## 2025-04-29 DEVICE — SCREW CORTEX ST 3.5X46MM: Type: IMPLANTABLE DEVICE | Site: TIBIA | Status: FUNCTIONAL

## 2025-04-29 DEVICE — SCREW LOCKING 3.5X 75MM: Type: IMPLANTABLE DEVICE | Site: TIBIA | Status: FUNCTIONAL

## 2025-04-29 DEVICE — SCREW LOCKING 3.5X 65MM: Type: IMPLANTABLE DEVICE | Site: TIBIA | Status: FUNCTIONAL

## 2025-04-29 DEVICE — IMPLANTABLE DEVICE: Type: IMPLANTABLE DEVICE | Site: TIBIA | Status: FUNCTIONAL

## 2025-04-29 DEVICE — SCREW CORTEX 3.5MM X 50MM: Type: IMPLANTABLE DEVICE | Site: TIBIA | Status: FUNCTIONAL

## 2025-04-29 DEVICE — SCREW CORTEX 3.5 38M: Type: IMPLANTABLE DEVICE | Site: TIBIA | Status: FUNCTIONAL

## 2025-04-29 DEVICE — SCREW CRTX LOW PROFILE H 52MM: Type: IMPLANTABLE DEVICE | Site: TIBIA | Status: FUNCTIONAL

## 2025-04-29 DEVICE — SCREW CORTEX 3.5X42MM: Type: IMPLANTABLE DEVICE | Site: TIBIA | Status: FUNCTIONAL

## 2025-04-29 DEVICE — SCREW CORTEX 3.5MM SELF-TAPPIN: Type: IMPLANTABLE DEVICE | Site: TIBIA | Status: FUNCTIONAL

## 2025-04-29 DEVICE — SCREW LOCKING 3.5X 70MM: Type: IMPLANTABLE DEVICE | Site: TIBIA | Status: FUNCTIONAL

## 2025-04-29 DEVICE — SCREW CORTEX 3.5 X 40: Type: IMPLANTABLE DEVICE | Site: TIBIA | Status: FUNCTIONAL

## 2025-04-29 DEVICE — PLATE TUBULAR 1/3 85MM 7HOLE: Type: IMPLANTABLE DEVICE | Site: TIBIA | Status: FUNCTIONAL

## 2025-04-29 DEVICE — SCREW LOCKING 3.5MM/60MM RECES: Type: IMPLANTABLE DEVICE | Site: TIBIA | Status: FUNCTIONAL

## 2025-04-29 DEVICE — SCREW CORTEX S/T 3.5X55: Type: IMPLANTABLE DEVICE | Site: TIBIA | Status: FUNCTIONAL

## 2025-04-29 RX ORDER — POLYETHYLENE GLYCOL 3350 17 G/17G
17 POWDER, FOR SOLUTION ORAL DAILY
Status: DISCONTINUED | OUTPATIENT
Start: 2025-04-29 | End: 2025-05-01 | Stop reason: HOSPADM

## 2025-04-29 RX ORDER — LIDOCAINE HYDROCHLORIDE 10 MG/ML
1 INJECTION, SOLUTION EPIDURAL; INFILTRATION; INTRACAUDAL; PERINEURAL ONCE AS NEEDED
Status: DISCONTINUED | OUTPATIENT
Start: 2025-04-29 | End: 2025-05-01 | Stop reason: HOSPADM

## 2025-04-29 RX ORDER — VANCOMYCIN HYDROCHLORIDE 1 G/20ML
INJECTION, POWDER, LYOPHILIZED, FOR SOLUTION INTRAVENOUS
Status: DISCONTINUED | OUTPATIENT
Start: 2025-04-29 | End: 2025-04-29 | Stop reason: HOSPADM

## 2025-04-29 RX ORDER — ROPIVACAINE HYDROCHLORIDE 5 MG/ML
INJECTION, SOLUTION EPIDURAL; INFILTRATION; PERINEURAL
Status: COMPLETED | OUTPATIENT
Start: 2025-04-29 | End: 2025-04-29

## 2025-04-29 RX ORDER — ACETAMINOPHEN 10 MG/ML
INJECTION, SOLUTION INTRAVENOUS
Status: DISCONTINUED | OUTPATIENT
Start: 2025-04-29 | End: 2025-04-29

## 2025-04-29 RX ORDER — GABAPENTIN 100 MG/1
100 CAPSULE ORAL 3 TIMES DAILY
Status: DISCONTINUED | OUTPATIENT
Start: 2025-04-29 | End: 2025-05-01 | Stop reason: HOSPADM

## 2025-04-29 RX ORDER — ATORVASTATIN CALCIUM 10 MG/1
10 TABLET, FILM COATED ORAL DAILY
Status: DISCONTINUED | OUTPATIENT
Start: 2025-04-30 | End: 2025-05-01 | Stop reason: HOSPADM

## 2025-04-29 RX ORDER — CEFAZOLIN 2 G/1
2 INJECTION, POWDER, FOR SOLUTION INTRAMUSCULAR; INTRAVENOUS
Status: COMPLETED | OUTPATIENT
Start: 2025-04-29 | End: 2025-04-30

## 2025-04-29 RX ORDER — CELECOXIB 200 MG/1
200 CAPSULE ORAL DAILY
Status: DISCONTINUED | OUTPATIENT
Start: 2025-04-29 | End: 2025-05-01 | Stop reason: HOSPADM

## 2025-04-29 RX ORDER — OXYCODONE HYDROCHLORIDE 5 MG/1
5 TABLET ORAL
Status: DISCONTINUED | OUTPATIENT
Start: 2025-04-29 | End: 2025-05-01 | Stop reason: HOSPADM

## 2025-04-29 RX ORDER — GLUCAGON 1 MG
1 KIT INJECTION
Status: DISCONTINUED | OUTPATIENT
Start: 2025-04-29 | End: 2025-05-01 | Stop reason: HOSPADM

## 2025-04-29 RX ORDER — FENTANYL CITRATE 50 UG/ML
25-200 INJECTION, SOLUTION INTRAMUSCULAR; INTRAVENOUS
Status: DISCONTINUED | OUTPATIENT
Start: 2025-04-29 | End: 2025-04-29

## 2025-04-29 RX ORDER — ASPIRIN 81 MG/1
81 TABLET ORAL 2 TIMES DAILY
Status: DISCONTINUED | OUTPATIENT
Start: 2025-04-29 | End: 2025-05-01 | Stop reason: HOSPADM

## 2025-04-29 RX ORDER — KETAMINE HCL IN 0.9 % NACL 50 MG/5 ML
SYRINGE (ML) INTRAVENOUS
Status: DISCONTINUED | OUTPATIENT
Start: 2025-04-29 | End: 2025-04-29

## 2025-04-29 RX ORDER — SODIUM CHLORIDE 9 MG/ML
INJECTION, SOLUTION INTRAVENOUS CONTINUOUS
Status: DISCONTINUED | OUTPATIENT
Start: 2025-04-29 | End: 2025-04-29

## 2025-04-29 RX ORDER — ONDANSETRON 8 MG/1
8 TABLET, ORALLY DISINTEGRATING ORAL EVERY 8 HOURS PRN
Status: DISCONTINUED | OUTPATIENT
Start: 2025-04-29 | End: 2025-05-01 | Stop reason: HOSPADM

## 2025-04-29 RX ORDER — SODIUM CHLORIDE 0.9 % (FLUSH) 0.9 %
10 SYRINGE (ML) INJECTION
Status: DISCONTINUED | OUTPATIENT
Start: 2025-04-29 | End: 2025-05-01 | Stop reason: HOSPADM

## 2025-04-29 RX ORDER — ACETAMINOPHEN 500 MG
1000 TABLET ORAL EVERY 6 HOURS
Status: DISCONTINUED | OUTPATIENT
Start: 2025-04-29 | End: 2025-04-29

## 2025-04-29 RX ORDER — CEFADROXIL 500 MG/1
500 CAPSULE ORAL EVERY 12 HOURS
Status: DISCONTINUED | OUTPATIENT
Start: 2025-04-30 | End: 2025-05-01 | Stop reason: HOSPADM

## 2025-04-29 RX ORDER — ACETAMINOPHEN 500 MG
1000 TABLET ORAL EVERY 6 HOURS
Status: DISCONTINUED | OUTPATIENT
Start: 2025-04-29 | End: 2025-05-01 | Stop reason: HOSPADM

## 2025-04-29 RX ORDER — METHOCARBAMOL 750 MG/1
750 TABLET, FILM COATED ORAL EVERY 8 HOURS
Qty: 42 TABLET | Refills: 0 | Status: SHIPPED | OUTPATIENT
Start: 2025-04-29 | End: 2025-05-14

## 2025-04-29 RX ORDER — GLUCAGON 1 MG
1 KIT INJECTION
Status: DISCONTINUED | OUTPATIENT
Start: 2025-04-29 | End: 2025-04-29 | Stop reason: HOSPADM

## 2025-04-29 RX ORDER — MIDAZOLAM HYDROCHLORIDE 1 MG/ML
.5-4 INJECTION, SOLUTION INTRAMUSCULAR; INTRAVENOUS
Status: DISCONTINUED | OUTPATIENT
Start: 2025-04-29 | End: 2025-04-29

## 2025-04-29 RX ORDER — FENTANYL CITRATE 50 UG/ML
INJECTION, SOLUTION INTRAMUSCULAR; INTRAVENOUS
Status: DISCONTINUED | OUTPATIENT
Start: 2025-04-29 | End: 2025-04-29

## 2025-04-29 RX ORDER — MIDAZOLAM HYDROCHLORIDE 1 MG/ML
INJECTION INTRAMUSCULAR; INTRAVENOUS
Status: DISCONTINUED | OUTPATIENT
Start: 2025-04-29 | End: 2025-04-29

## 2025-04-29 RX ORDER — TRANEXAMIC ACID 100 MG/ML
INJECTION, SOLUTION INTRAVENOUS
Status: DISCONTINUED | OUTPATIENT
Start: 2025-04-29 | End: 2025-04-29

## 2025-04-29 RX ORDER — MUPIROCIN 20 MG/G
OINTMENT TOPICAL
Status: DISCONTINUED | OUTPATIENT
Start: 2025-04-29 | End: 2025-04-29

## 2025-04-29 RX ORDER — HYDROMORPHONE HYDROCHLORIDE 1 MG/ML
0.2 INJECTION, SOLUTION INTRAMUSCULAR; INTRAVENOUS; SUBCUTANEOUS EVERY 5 MIN PRN
Status: DISCONTINUED | OUTPATIENT
Start: 2025-04-29 | End: 2025-04-29 | Stop reason: HOSPADM

## 2025-04-29 RX ORDER — DEXAMETHASONE SODIUM PHOSPHATE 4 MG/ML
INJECTION, SOLUTION INTRA-ARTICULAR; INTRALESIONAL; INTRAMUSCULAR; INTRAVENOUS; SOFT TISSUE
Status: DISCONTINUED | OUTPATIENT
Start: 2025-04-29 | End: 2025-04-29

## 2025-04-29 RX ORDER — ESMOLOL HYDROCHLORIDE 10 MG/ML
INJECTION INTRAVENOUS
Status: DISCONTINUED | OUTPATIENT
Start: 2025-04-29 | End: 2025-04-29

## 2025-04-29 RX ORDER — PROPOFOL 10 MG/ML
VIAL (ML) INTRAVENOUS
Status: DISCONTINUED | OUTPATIENT
Start: 2025-04-29 | End: 2025-04-29

## 2025-04-29 RX ORDER — METFORMIN HYDROCHLORIDE 500 MG/1
500 TABLET ORAL
Status: DISCONTINUED | OUTPATIENT
Start: 2025-04-30 | End: 2025-05-01 | Stop reason: HOSPADM

## 2025-04-29 RX ORDER — IBUPROFEN 200 MG
16 TABLET ORAL
Status: DISCONTINUED | OUTPATIENT
Start: 2025-04-29 | End: 2025-05-01 | Stop reason: HOSPADM

## 2025-04-29 RX ORDER — LIDOCAINE HYDROCHLORIDE 20 MG/ML
INJECTION INTRAVENOUS
Status: DISCONTINUED | OUTPATIENT
Start: 2025-04-29 | End: 2025-04-29

## 2025-04-29 RX ORDER — SODIUM CHLORIDE 0.9 % (FLUSH) 0.9 %
10 SYRINGE (ML) INJECTION
Status: DISCONTINUED | OUTPATIENT
Start: 2025-04-29 | End: 2025-04-29 | Stop reason: HOSPADM

## 2025-04-29 RX ORDER — FENTANYL CITRATE 50 UG/ML
25 INJECTION, SOLUTION INTRAMUSCULAR; INTRAVENOUS EVERY 5 MIN PRN
Status: DISCONTINUED | OUTPATIENT
Start: 2025-04-29 | End: 2025-04-29 | Stop reason: HOSPADM

## 2025-04-29 RX ORDER — PANTOPRAZOLE SODIUM 40 MG/1
40 TABLET, DELAYED RELEASE ORAL DAILY
Status: DISCONTINUED | OUTPATIENT
Start: 2025-04-30 | End: 2025-05-01 | Stop reason: HOSPADM

## 2025-04-29 RX ORDER — OXYCODONE HYDROCHLORIDE 5 MG/1
5-10 TABLET ORAL EVERY 4 HOURS PRN
Qty: 42 TABLET | Refills: 0 | Status: SHIPPED | OUTPATIENT
Start: 2025-04-29

## 2025-04-29 RX ORDER — ONDANSETRON HYDROCHLORIDE 2 MG/ML
INJECTION, SOLUTION INTRAVENOUS
Status: DISCONTINUED | OUTPATIENT
Start: 2025-04-29 | End: 2025-04-29

## 2025-04-29 RX ORDER — IBUPROFEN 200 MG
24 TABLET ORAL
Status: DISCONTINUED | OUTPATIENT
Start: 2025-04-29 | End: 2025-05-01 | Stop reason: HOSPADM

## 2025-04-29 RX ORDER — AMOXICILLIN 250 MG
1 CAPSULE ORAL DAILY
Status: DISCONTINUED | OUTPATIENT
Start: 2025-04-29 | End: 2025-05-01 | Stop reason: HOSPADM

## 2025-04-29 RX ORDER — TALC
6 POWDER (GRAM) TOPICAL NIGHTLY PRN
Status: DISCONTINUED | OUTPATIENT
Start: 2025-04-29 | End: 2025-05-01 | Stop reason: HOSPADM

## 2025-04-29 RX ORDER — CLINDAMYCIN PHOSPHATE 900 MG/50ML
900 INJECTION, SOLUTION INTRAVENOUS
Status: COMPLETED | OUTPATIENT
Start: 2025-04-29 | End: 2025-04-29

## 2025-04-29 RX ORDER — METHOCARBAMOL 750 MG/1
750 TABLET, FILM COATED ORAL EVERY 8 HOURS
Status: DISCONTINUED | OUTPATIENT
Start: 2025-04-29 | End: 2025-04-30

## 2025-04-29 RX ORDER — HALOPERIDOL LACTATE 5 MG/ML
0.5 INJECTION, SOLUTION INTRAMUSCULAR EVERY 10 MIN PRN
Status: DISCONTINUED | OUTPATIENT
Start: 2025-04-29 | End: 2025-04-29 | Stop reason: HOSPADM

## 2025-04-29 RX ORDER — CEFADROXIL 500 MG/1
500 CAPSULE ORAL EVERY 12 HOURS
Qty: 14 CAPSULE | Refills: 0 | Status: SHIPPED | OUTPATIENT
Start: 2025-04-29 | End: 2025-05-07

## 2025-04-29 RX ORDER — INSULIN ASPART 100 [IU]/ML
0-10 INJECTION, SOLUTION INTRAVENOUS; SUBCUTANEOUS
Status: DISCONTINUED | OUTPATIENT
Start: 2025-04-29 | End: 2025-05-01 | Stop reason: HOSPADM

## 2025-04-29 RX ORDER — ROCURONIUM BROMIDE 10 MG/ML
INJECTION, SOLUTION INTRAVENOUS
Status: DISCONTINUED | OUTPATIENT
Start: 2025-04-29 | End: 2025-04-29

## 2025-04-29 RX ORDER — GABAPENTIN 100 MG/1
100 CAPSULE ORAL 3 TIMES DAILY
Status: DISCONTINUED | OUTPATIENT
Start: 2025-04-29 | End: 2025-04-29

## 2025-04-29 RX ORDER — CELECOXIB 200 MG/1
200 CAPSULE ORAL DAILY
Qty: 14 CAPSULE | Refills: 0 | Status: SHIPPED | OUTPATIENT
Start: 2025-04-29 | End: 2025-05-14

## 2025-04-29 RX ORDER — DEXMEDETOMIDINE HYDROCHLORIDE 100 UG/ML
INJECTION, SOLUTION INTRAVENOUS
Status: DISCONTINUED | OUTPATIENT
Start: 2025-04-29 | End: 2025-04-29

## 2025-04-29 RX ORDER — ROPIVACAINE HYDROCHLORIDE 2 MG/ML
INJECTION, SOLUTION EPIDURAL; INFILTRATION; PERINEURAL CONTINUOUS
Status: DISCONTINUED | OUTPATIENT
Start: 2025-04-29 | End: 2025-05-01 | Stop reason: HOSPADM

## 2025-04-29 RX ORDER — METHOCARBAMOL 500 MG/1
500 TABLET, FILM COATED ORAL EVERY 8 HOURS
Status: DISCONTINUED | OUTPATIENT
Start: 2025-04-29 | End: 2025-04-29

## 2025-04-29 RX ORDER — ACETAMINOPHEN 500 MG
1000 TABLET ORAL EVERY 8 HOURS
Qty: 84 TABLET | Refills: 0 | Status: SHIPPED | OUTPATIENT
Start: 2025-04-29 | End: 2025-05-14

## 2025-04-29 RX ADMIN — MIDAZOLAM 2 MG: 1 INJECTION INTRAMUSCULAR; INTRAVENOUS at 08:04

## 2025-04-29 RX ADMIN — Medication 10 MG: at 10:04

## 2025-04-29 RX ADMIN — SODIUM CHLORIDE 3 G: 9 INJECTION, SOLUTION INTRAVENOUS at 01:04

## 2025-04-29 RX ADMIN — ONDANSETRON 4 MG: 2 INJECTION INTRAMUSCULAR; INTRAVENOUS at 01:04

## 2025-04-29 RX ADMIN — SODIUM CHLORIDE, SODIUM GLUCONATE, SODIUM ACETATE, POTASSIUM CHLORIDE, MAGNESIUM CHLORIDE, SODIUM PHOSPHATE, DIBASIC, AND POTASSIUM PHOSPHATE: .53; .5; .37; .037; .03; .012; .00082 INJECTION, SOLUTION INTRAVENOUS at 01:04

## 2025-04-29 RX ADMIN — SUGAMMADEX 249 MG: 100 INJECTION, SOLUTION INTRAVENOUS at 02:04

## 2025-04-29 RX ADMIN — SODIUM CHLORIDE, SODIUM GLUCONATE, SODIUM ACETATE, POTASSIUM CHLORIDE, MAGNESIUM CHLORIDE, SODIUM PHOSPHATE, DIBASIC, AND POTASSIUM PHOSPHATE: .53; .5; .37; .037; .03; .012; .00082 INJECTION, SOLUTION INTRAVENOUS at 10:04

## 2025-04-29 RX ADMIN — METHOCARBAMOL 750 MG: 750 TABLET ORAL at 08:04

## 2025-04-29 RX ADMIN — PROPOFOL 30 MG: 10 INJECTION, EMULSION INTRAVENOUS at 11:04

## 2025-04-29 RX ADMIN — FENTANYL CITRATE 50 MCG: 50 INJECTION, SOLUTION INTRAMUSCULAR; INTRAVENOUS at 09:04

## 2025-04-29 RX ADMIN — ROPIVACAINE HYDROCHLORIDE 7.5 ML: 5 INJECTION EPIDURAL; INFILTRATION; PERINEURAL at 08:04

## 2025-04-29 RX ADMIN — CEFAZOLIN 2 G: 2 INJECTION, POWDER, FOR SOLUTION INTRAMUSCULAR; INTRAVENOUS at 03:04

## 2025-04-29 RX ADMIN — PROPOFOL 170 MG: 10 INJECTION, EMULSION INTRAVENOUS at 09:04

## 2025-04-29 RX ADMIN — TRANEXAMIC ACID 1000 MG: 100 INJECTION, SOLUTION INTRAVENOUS at 09:04

## 2025-04-29 RX ADMIN — ROCURONIUM BROMIDE 40 MG: 10 INJECTION, SOLUTION INTRAVENOUS at 11:04

## 2025-04-29 RX ADMIN — SODIUM CHLORIDE 3 G: 9 INJECTION, SOLUTION INTRAVENOUS at 09:04

## 2025-04-29 RX ADMIN — DEXMEDETOMIDINE 12 MCG: 200 INJECTION, SOLUTION INTRAVENOUS at 09:04

## 2025-04-29 RX ADMIN — LIDOCAINE HYDROCHLORIDE 100 MG: 20 INJECTION INTRAVENOUS at 09:04

## 2025-04-29 RX ADMIN — ROCURONIUM BROMIDE 30 MG: 10 INJECTION, SOLUTION INTRAVENOUS at 10:04

## 2025-04-29 RX ADMIN — INSULIN ASPART 4 UNITS: 100 INJECTION, SOLUTION INTRAVENOUS; SUBCUTANEOUS at 09:04

## 2025-04-29 RX ADMIN — FENTANYL CITRATE 100 MCG: 50 INJECTION INTRAMUSCULAR; INTRAVENOUS at 08:04

## 2025-04-29 RX ADMIN — MIDAZOLAM HYDROCHLORIDE 1 MG: 2 INJECTION, SOLUTION INTRAMUSCULAR; INTRAVENOUS at 09:04

## 2025-04-29 RX ADMIN — GABAPENTIN 100 MG: 100 CAPSULE ORAL at 08:04

## 2025-04-29 RX ADMIN — INSULIN ASPART 8 UNITS: 100 INJECTION, SOLUTION INTRAVENOUS; SUBCUTANEOUS at 04:04

## 2025-04-29 RX ADMIN — ASPIRIN 81 MG: 81 TABLET, COATED ORAL at 08:04

## 2025-04-29 RX ADMIN — Medication 10 MG: at 11:04

## 2025-04-29 RX ADMIN — Medication 10 MG: at 12:04

## 2025-04-29 RX ADMIN — FENTANYL CITRATE 25 MCG: 50 INJECTION, SOLUTION INTRAMUSCULAR; INTRAVENOUS at 11:04

## 2025-04-29 RX ADMIN — Medication 30 MG: at 09:04

## 2025-04-29 RX ADMIN — FENTANYL CITRATE 50 MCG: 50 INJECTION, SOLUTION INTRAMUSCULAR; INTRAVENOUS at 11:04

## 2025-04-29 RX ADMIN — MUPIROCIN: 20 OINTMENT TOPICAL at 07:04

## 2025-04-29 RX ADMIN — VANCOMYCIN HYDROCHLORIDE 1000 MG: 1 INJECTION, POWDER, LYOPHILIZED, FOR SOLUTION INTRAVENOUS at 10:04

## 2025-04-29 RX ADMIN — DEXMEDETOMIDINE 4 MCG: 200 INJECTION, SOLUTION INTRAVENOUS at 11:04

## 2025-04-29 RX ADMIN — ROCURONIUM BROMIDE 30 MG: 10 INJECTION, SOLUTION INTRAVENOUS at 11:04

## 2025-04-29 RX ADMIN — ESMOLOL HYDROCHLORIDE 20 MG: 100 INJECTION, SOLUTION INTRAVENOUS at 12:04

## 2025-04-29 RX ADMIN — ESMOLOL HYDROCHLORIDE 30 MG: 100 INJECTION, SOLUTION INTRAVENOUS at 01:04

## 2025-04-29 RX ADMIN — SODIUM CHLORIDE: 0.9 INJECTION, SOLUTION INTRAVENOUS at 08:04

## 2025-04-29 RX ADMIN — CLINDAMYCIN IN 5 PERCENT DEXTROSE 900 MG: 18 INJECTION, SOLUTION INTRAVENOUS at 09:04

## 2025-04-29 RX ADMIN — FENTANYL CITRATE 50 MCG: 50 INJECTION, SOLUTION INTRAMUSCULAR; INTRAVENOUS at 10:04

## 2025-04-29 RX ADMIN — ACETAMINOPHEN 1000 MG: 10 INJECTION INTRAVENOUS at 01:04

## 2025-04-29 RX ADMIN — CELECOXIB 200 MG: 200 CAPSULE ORAL at 03:04

## 2025-04-29 RX ADMIN — TRANEXAMIC ACID 1000 MG: 100 INJECTION, SOLUTION INTRAVENOUS at 01:04

## 2025-04-29 RX ADMIN — ROCURONIUM BROMIDE 60 MG: 10 INJECTION, SOLUTION INTRAVENOUS at 09:04

## 2025-04-29 RX ADMIN — ACETAMINOPHEN 1000 MG: 500 TABLET ORAL at 05:04

## 2025-04-29 RX ADMIN — Medication: at 02:04

## 2025-04-29 RX ADMIN — ROCURONIUM BROMIDE 20 MG: 10 INJECTION, SOLUTION INTRAVENOUS at 09:04

## 2025-04-29 RX ADMIN — ROCURONIUM BROMIDE 20 MG: 10 INJECTION, SOLUTION INTRAVENOUS at 12:04

## 2025-04-29 RX ADMIN — DEXAMETHASONE SODIUM PHOSPHATE 8 MG: 4 INJECTION, SOLUTION INTRAMUSCULAR; INTRAVENOUS at 09:04

## 2025-04-29 RX ADMIN — DEXMEDETOMIDINE 8 MCG: 200 INJECTION, SOLUTION INTRAVENOUS at 01:04

## 2025-04-29 RX ADMIN — ROCURONIUM BROMIDE 20 MG: 10 INJECTION, SOLUTION INTRAVENOUS at 10:04

## 2025-04-29 RX ADMIN — FENTANYL CITRATE 50 MCG: 50 INJECTION, SOLUTION INTRAMUSCULAR; INTRAVENOUS at 02:04

## 2025-04-29 RX ADMIN — SENNOSIDES AND DOCUSATE SODIUM 1 TABLET: 50; 8.6 TABLET ORAL at 03:04

## 2025-04-29 RX ADMIN — ROCURONIUM BROMIDE 30 MG: 10 INJECTION, SOLUTION INTRAVENOUS at 12:04

## 2025-04-29 RX ADMIN — ESMOLOL HYDROCHLORIDE 20 MG: 100 INJECTION, SOLUTION INTRAVENOUS at 02:04

## 2025-04-29 NOTE — TRANSFER OF CARE
"Anesthesia Transfer of Care Note    Patient: Shlomo Wallace    Procedure(s) Performed: Procedure(s) (LRB):  ORIF, FRACTURE, TIBIAL PLATEAU (Right)  REMOVAL, EXTERNAL FIXATION DEVICE (Right)  ORIF, FRACTURE, TIBIAL TUBEROSITY (Right)    Patient location: PACU    Anesthesia Type: general    Transport from OR: Transported from OR on 6-10 L/min O2 by face mask with adequate spontaneous ventilation    Post pain: adequate analgesia    Post assessment: no apparent anesthetic complications    Post vital signs: stable    Level of consciousness: sedated    Nausea/Vomiting: no nausea/vomiting    Complications: none    Transfer of care protocol was followedComments: Oral airway in place.       Last vitals: Visit Vitals  BP (!) 145/91 (BP Location: Right arm, Patient Position: Lying)   Pulse 101   Temp 36.1 °C (97 °F) (Temporal)   Resp 11   Ht 6' 4" (1.93 m)   Wt 124.7 kg (274 lb 14.6 oz)   SpO2 100%   BMI 33.46 kg/m²     "

## 2025-04-29 NOTE — ANESTHESIA PREPROCEDURE EVALUATION
04/28/2025  [facility]  Anesthesia Pre-Operative Evaluation     Patient Name: Shlomo Wallace  YOB: 1970  MRN: [id number]  [facility]: [id number]       Admit Date: (Not on file)   Admit Team: Networked reference to record PCT      Date of Procedure: 4/29/2025  Anesthesia: Choice Procedure: Procedure(s) (LRB):  ORIF, FRACTURE, TIBIAL PLATEAU (Right)  Pre-Operative Diagnosis: Closed bicondylar fracture of right tibial plateau [S82.141A]  Proceduralist:Surgeons and Role:     * Marshal Manuel MD - Primary  Code Status: Prior   Advanced Directive: <no information>  Isolation Precautions: No active isolations  Capacity: Full capacity     SUBJECTIVE:   Shlomo Wallace is a 54 y.o. male who  has a past medical history of Diabetes mellitus, type 2.  No notes on file      Hospital LOS: 0 days  ICU LOS: Patient does not have an ICU stay during this admission.    he has a current medication list which includes the following long-term medication(s): aspirin, atorvastatin, blood-glucose meter, dexcom g6 sensor, dexcom g6 transmitter, gabapentin, levemir flextouch u100 insulin, metformin, and pantoprazole.     ALLERGIES:   Review of patient's allergies indicates:  No Known Allergies  LDA:   AIRWAY:         [unfilled]     Lines/Drains/Airways       None                  Anesthesia Evaluation      Airway   Mallampati: II  TM distance: Normal  Neck ROM: Normal ROM  Dental      Pulmonary    Cardiovascular     Neuro/Psych      GI/Hepatic/Renal      Endo/Other    (+) diabetes mellitus  Abdominal                    MEDICATIONS:     Current Outpatient Medications on File Prior to Encounter   Medication Sig Dispense Refill Last Dose/Taking    acetaminophen (TYLENOL) 500 MG tablet Take 2 tablets (1,000 mg total) by mouth every 8 (eight) hours as needed for Pain. 84 tablet 0     aspirin (ECOTRIN)  "81 MG EC tablet Take 1 tablet (81 mg total) by mouth 2 (two) times a day. 120 tablet 0     atorvastatin (LIPITOR) 10 MG tablet Take 1 tablet (10 mg total) by mouth once daily. 90 tablet 3     blood sugar diagnostic Strp To check BG 3 times daily, to use with insurance preferred meter 200 each 2     blood-glucose meter kit To check BG 3 times daily with meals and dinner, to use with insurance preferred meter 1 each 0     blood-glucose sensor (DEXCOM G6 SENSOR) Sonya Use to check blood sugars twice daily 5 each 3     blood-glucose transmitter (DEXCOM G6 TRANSMITTER) Sonya Use to check blood sugar twice daily 1 each 3     celecoxib (CELEBREX) 200 MG capsule Take 1 capsule (200 mg total) by mouth once daily. (Patient not taking: Reported on 4/25/2025) 14 capsule 0 Not Taking    gabapentin (NEURONTIN) 100 MG capsule Take 1 capsule (100 mg total) by mouth 3 (three) times daily. (Patient not taking: Reported on 4/25/2025) 90 capsule 11 Not Taking    HYDROcodone-acetaminophen (NORCO) 5-325 mg per tablet Take 1 tablet by mouth every 4 to 6 hours as needed for Pain. 42 tablet 0     insulin detemir U-100 (LEVEMIR FLEXTOUCH U-100 INSULN) 100 unit/mL (3 mL) InPn pen Inject 15 Units into the skin every evening. 6 each 0     lancets Misc To check BG 3 times daily with meals and dinner, to use with insurance preferred meter 100 each 2     metFORMIN (GLUCOPHAGE) 500 MG tablet Take 1 tablet (500 mg total) by mouth daily with breakfast. 30 tablet 0     methocarbamoL (ROBAXIN) 500 MG Tab Take 1 tablet (500 mg total) by mouth 4 (four) times daily. for 14 days 56 tablet 0     ondansetron (ZOFRAN) 4 MG tablet Take 2 tablets (8 mg total) by mouth every 6 (six) hours as needed for Nausea. 28 tablet 0     pantoprazole (PROTONIX) 40 MG tablet Take 1 tablet (40 mg total) by mouth once daily. 30 tablet 0     pen needle, diabetic 32 gauge x 5/32" Ndle Use to give insulin daily 100 each 2     polyethylene glycol (GLYCOLAX) 17 gram/dose powder Use " "cap to measure 17g, mix with liquid, and drink by mouth daily as needed for Constipation. 510 g 0       Inpatient Medications:  Antibiotics (From admission, onward)      None          VTE Risk Mitigation (From admission, onward)      None              Current Medications[1]       History:   There are no hospital problems to display for this patient.    Surgical History:    has a past surgical history that includes application, external fixation device, lower extremity (Right, 4/17/2025).   Social History:    has no history on file for sexual activity.  reports that he has never smoked. He has never used smokeless tobacco. He reports current alcohol use. He reports that he does not use drugs.    There were no vitals filed for this visit.  Vital Signs Range (Last 24H):       There is no height or weight on file to calculate BMI.  Wt Readings from Last 4 Encounters:   04/24/25 124.7 kg (274 lb 14.6 oz)   04/17/25 124.7 kg (275 lb)   01/30/23 126.9 kg (279 lb 12.2 oz)   01/26/23 122.5 kg (270 lb)        Intake/Output - Last 3 Shifts       None          Lab Results   Component Value Date    WBC 8.40 04/17/2025    HGB 13.6 (L) 04/17/2025    HCT 41.2 04/17/2025     (L) 04/17/2025     04/17/2025    K 3.7 04/17/2025     04/17/2025    CREATININE 0.9 04/17/2025    BUN 11 04/17/2025    CO2 22 (L) 04/17/2025     (H) 12/08/2022    CALCIUM 9.2 04/17/2025    MG 1.7 04/17/2025    ALKPHOS 73 04/17/2025    ALT 47 (H) 04/17/2025    AST 31 04/17/2025    ALBUMIN 4.1 04/17/2025    INR 1.1 04/17/2025    APTT 22.4 04/17/2025    HGBA1C 7.6 (H) 04/17/2025    BNP <10 12/01/2022     No results found for this or any previous visit (from the past 12 hours).  No results for input(s): "WBC", "HGB", "HCT", "PLT", "NA", "K", "CREATININE", "GLU", "INR" in the last 168 hours.  No LMP for male patient.    EKG:   Results for orders placed or performed during the hospital encounter of 04/17/25   EKG 12-lead    Collection Time: " "04/17/25 12:21 PM   Result Value Ref Range    QRS Duration 116 ms    [facility] QTC Calculation 435 ms    Narrative    Test Reason : W19.XXXA,    Vent. Rate :  75 BPM     Atrial Rate :  75 BPM     P-R Int : 186 ms          QRS Dur : 116 ms      QT Int : 390 ms       P-R-T Axes :  67  18  28 degrees    QTcB Int : 435 ms    Normal sinus rhythm  Incomplete right bundle branch block  Borderline Abnormal ECG  When compared with ECG of 01-Dec-2022 16:48,  No significant change was found  Confirmed by Arias Fernando ([id number]) on 4/17/2025 12:35:08 PM    Referred By: AAAREFERRAL SELF           Confirmed By: Arias Fernando     TTE:  No results found for this or any previous visit.  No results found for this or any previous visit.  JESSICA:  No results found for this or any previous visit.  Stress Test:  No results found for this or any previous visit.     LHC:  No results found for this or any previous visit.     PFT:  No results found for: "FEV1", "FVC", "DMU1FOT", "TLC", "DLCO"           Pre-op Assessment    I have reviewed the Patient Summary Reports.     I have reviewed the Nursing Notes. I have reviewed the NPO Status.   I have reviewed the Medications.     Review of Systems  Anesthesia Hx:               Denies Personal Hx of Anesthesia complications.                    Endocrine:  Diabetes               Physical Exam  General: Alert, Cooperative and Oriented    Airway:  Mallampati: II   Mouth Opening: Normal  TM Distance: Normal  Tongue: Normal  Neck ROM: Normal ROM        Anesthesia Plan  Type of Anesthesia, risks & benefits discussed:    Anesthesia Type: Gen ETT, Regional  Intra-op Monitoring Plan: Standard ASA Monitors  Post Op Pain Control Plan: multimodal analgesia  Induction:  IV  Airway Plan: Direct  Informed Consent: Informed consent signed with the Patient and all parties understand the risks and agree with anesthesia plan.  All questions answered.   ASA Score: 2  Day of Surgery Review of History & Physical: H&P Update " referred to the surgeon/provider.    Ready For Surgery From Anesthesia Perspective.     .           [1]   No current facility-administered medications for this encounter.     Current Outpatient Medications   Medication Sig Dispense Refill    acetaminophen (TYLENOL) 500 MG tablet Take 2 tablets (1,000 mg total) by mouth every 8 (eight) hours as needed for Pain. 84 tablet 0    aspirin (ECOTRIN) 81 MG EC tablet Take 1 tablet (81 mg total) by mouth 2 (two) times a day. 120 tablet 0    atorvastatin (LIPITOR) 10 MG tablet Take 1 tablet (10 mg total) by mouth once daily. 90 tablet 3    blood sugar diagnostic Strp To check BG 3 times daily, to use with insurance preferred meter 200 each 2    blood-glucose meter kit To check BG 3 times daily with meals and dinner, to use with insurance preferred meter 1 each 0    blood-glucose sensor (DEXCOM G6 SENSOR) Sonya Use to check blood sugars twice daily 5 each 3    blood-glucose transmitter (DEXCOM G6 TRANSMITTER) Sonya Use to check blood sugar twice daily 1 each 3    celecoxib (CELEBREX) 200 MG capsule Take 1 capsule (200 mg total) by mouth once daily. (Patient not taking: Reported on 4/25/2025) 14 capsule 0    gabapentin (NEURONTIN) 100 MG capsule Take 1 capsule (100 mg total) by mouth 3 (three) times daily. (Patient not taking: Reported on 4/25/2025) 90 capsule 11    HYDROcodone-acetaminophen (NORCO) 5-325 mg per tablet Take 1 tablet by mouth every 4 to 6 hours as needed for Pain. 42 tablet 0    insulin detemir U-100 (LEVEMIR FLEXTOUCH U-100 INSULN) 100 unit/mL (3 mL) InPn pen Inject 15 Units into the skin every evening. 6 each 0    lancets Misc To check BG 3 times daily with meals and dinner, to use with insurance preferred meter 100 each 2    metFORMIN (GLUCOPHAGE) 500 MG tablet Take 1 tablet (500 mg total) by mouth daily with breakfast. 30 tablet 0    methocarbamoL (ROBAXIN) 500 MG Tab Take 1 tablet (500 mg total) by mouth 4 (four) times daily. for 14 days 56 tablet 0     "ondansetron (ZOFRAN) 4 MG tablet Take 2 tablets (8 mg total) by mouth every 6 (six) hours as needed for Nausea. 28 tablet 0    pantoprazole (PROTONIX) 40 MG tablet Take 1 tablet (40 mg total) by mouth once daily. 30 tablet 0    pen needle, diabetic 32 gauge x 5/32" Ndle Use to give insulin daily 100 each 2    polyethylene glycol (GLYCOLAX) 17 gram/dose powder Use cap to measure 17g, mix with liquid, and drink by mouth daily as needed for Constipation. 510 g 0     "

## 2025-04-29 NOTE — NURSING TRANSFER
Nursing Transfer Note      4/29/2025   4:12 PM    Nurse giving handoff: Laine PACU RN  Nurse receiving handoff: RENETTA RN    Transfer To: [id number]    Transfer via bed    Transfer with wound vac, CADD pump    Transported by PCTs    Order for Tele Monitor? No    Medicines sent: ropivacaine     Any special needs or follow-up needed: no    Patient belongings transferred with patient: No belongings (family has)    Chart send with patient: Yes    Notified: mother    Patient reassessed at: 1610

## 2025-04-29 NOTE — PROGRESS NOTES
"Principal Orthopedic Problem: right bicondylar tibial plateau fracture      Relevant Medical History: according to chart    PMHX: DM    Ambulates without assistance@ baseline   Tobacco Use: Lifetime nonsmoker  Denies BOYD   Lives with his mother in [place], [place]. No stairs to enter the home.    Anticoagulation:  None at baseline  Immunosuppressive medications:  None at baseline  Occupation:  He works for [employer]  No hx of MI, CVA, DVT/PE  No hx of cancer, chemotherapy, or radiation        Lab Results   Component Value Date    HGBA1C 7.6 (H) 04/17/2025     Estimated body mass index is 33.46 kg/m² as calculated from the following:    Height as of 4/24/25: 6' 4" (1.93 m).    Weight as of 4/24/25: 124.7 kg (274 lb 14.6 oz).      HPI:   54 year old male, fall 3 feet out of a truck while at work on 04/19/25  RADS: Right bicondylar tibial plateau fracture     04/17/25: :   Spanning external fixation right bicondylar tibial plateau fracture - 20690  Closed treatment of right bicondylar tibial plateau fracture with manipulation under anesthesia     Mr. Wallace is here today for a post-operative visit    Interval History:  he reports that he is doing ok.   he is at home . he is  participating in PT/OT. Home health   He is getting pin site cleaning. He is elevating and icing his knee.   Pain is controlled somewhat.  he is  taking pain medication.  Reports oxycodone is not helping at all.   he denies fever, chills, and sweats .     Physical exam:    Patient arrives to exam room: wheelchair.  Patient is  accompanied    Pin sites is clean, dry and intact.    Healing well no signs of breakdown or infection. Skin does wrinkle     RADS: All pertinent images were reviewed by myself:   none done today    Assessment:  Post-op visit ( 2 weeks)    Plan:  Current care, treatment plan, precautions, activity level/ modifications, limitations, rehabilitation exercises and proposed future treatment were discussed with the patient. " We discussed the need to monitor for changes in symptoms and condition and report them to the physician.  Discussed importance of compliance with all appointments and follow up examinations.     WOUND CARE :  - daily pin site cleaning.   -Patient was advised to monitor wound closely and multiple times daily for any problems. Call clinic immediately or report to ED for immediate medical attention for any complications including reopening of wound, drainage, purulence, redness, streaking, odor, pain out of proportion, fever, chills, etc.       ACTIVITY:   - light  -range of motion as tolerated all parts not in the external fixation device, encouraged range of motion of ankle to prevent euqanis.    - NWB      -PT/OT, home health , Patient is responsible to establish and continue care      PAIN MEDICATION:   - Multimodal pain control  - Pain medication: refill was needed, d/c oxycodone and tylenol and changed to Norco   - Pain medication refill policy provided to patient for review, yes.    - Patient was informed a multi-modal approach is used to treat their pain. With the goal to get off of narcotic pain medication and discontinue as soon as possible.   - ice and elevation to reduce pain and swelling     DVT PROPHYLAXIS:   - ASA 81 mg bid    FOLLOW UP:   - Patient will follow up in the clinic in 1 weeks, skin check and NPO with plans for surgery that day if skin is ready.       If there are any questions prior to scheduled follow up, the patient was instructed to contact the office    Discussed treatment options with patient. Operative vs non-operative treatment discussed with patient. Recommend operative intervention. Patient agreed.     - rest ice and elevation to reduce swelling.    Discussed proper and consistent elevation of extremities, above the level of the heart, while at rest, to help control/improve edema and decrease pain.  - NWB   - consents signed,    - lab, chest x-ray and EKG ordered  previously ,  results in chart  - aspirin  taking blood thinners       -Discussed COVID19 with the patient, they are aware of our current policies and procedures, were given the option of delaying surgery, and they elect to proceed.      Pre, charu, and post operative procedure and expectations were discussed.  Questions were answered. The patient has been educated and is ready to proceed with surgery.  Approximately 30 minutes was spent discussing surgical outcomes, plans, procedures, pre, charu, and post operative expectations and care. The risks, benefits and alternatives to surgery were discussed with the patient at great length.  These include bleeding, infection, vessel/nerve damage, pain, numbness, tingling, complex regional pain syndrome, hardware/surgical failure, need for further surgery, malunion, nonunion, DVT, PE, arthritis and death. He also understands that the risks of surgery may be greater for some patients due to health or lifestyle issues, such as a current condition or a history of heart disease, obesity, clotting disorders, recurrent infections, smoking, sedentary lifestyle, or noncompliance with medications, therapy, or followup. The degree of the increased risk is hard to estimate with any degree of precision.  Patient states an understanding and wishes to proceed with surgery.   All questions were answered.  No guarantees were implied or stated.  Informed consent was obtained  The patient will contact us if the have any questions, concerns, and changes in their medical condition prior to surgery.

## 2025-04-29 NOTE — H&P (VIEW-ONLY)
"Principal Orthopedic Problem: right bicondylar tibial plateau fracture      Relevant Medical History: according to chart    PMHX: DM    Ambulates without assistance@ baseline   Tobacco Use: Lifetime nonsmoker  Denies BOYD   Lives with his mother in [place], [place]. No stairs to enter the home.    Anticoagulation:  None at baseline  Immunosuppressive medications:  None at baseline  Occupation:  He works for [employer]  No hx of MI, CVA, DVT/PE  No hx of cancer, chemotherapy, or radiation        Lab Results   Component Value Date    HGBA1C 7.6 (H) 04/17/2025     Estimated body mass index is 33.46 kg/m² as calculated from the following:    Height as of 4/24/25: 6' 4" (1.93 m).    Weight as of 4/24/25: 124.7 kg (274 lb 14.6 oz).      HPI:   54 year old male, fall 3 feet out of a truck while at work on 04/19/25  RADS: Right bicondylar tibial plateau fracture     04/17/25: :   Spanning external fixation right bicondylar tibial plateau fracture - 20690  Closed treatment of right bicondylar tibial plateau fracture with manipulation under anesthesia     Mr. Wallace is here today for a post-operative visit    Interval History:  he reports that he is doing ok.   he is at home . he is  participating in PT/OT. Home health   He is getting pin site cleaning. He is elevating and icing his knee.   Pain is controlled somewhat.  he is  taking pain medication.  Reports oxycodone is not helping at all.   he denies fever, chills, and sweats .     Physical exam:    Patient arrives to exam room: wheelchair.  Patient is  accompanied    Pin sites is clean, dry and intact.    Healing well no signs of breakdown or infection. Skin does wrinkle     RADS: All pertinent images were reviewed by myself:   none done today    Assessment:  Post-op visit ( 2 weeks)    Plan:  Current care, treatment plan, precautions, activity level/ modifications, limitations, rehabilitation exercises and proposed future treatment were discussed with the patient. " We discussed the need to monitor for changes in symptoms and condition and report them to the physician.  Discussed importance of compliance with all appointments and follow up examinations.     WOUND CARE :  - daily pin site cleaning.   -Patient was advised to monitor wound closely and multiple times daily for any problems. Call clinic immediately or report to ED for immediate medical attention for any complications including reopening of wound, drainage, purulence, redness, streaking, odor, pain out of proportion, fever, chills, etc.       ACTIVITY:   - light  -range of motion as tolerated all parts not in the external fixation device, encouraged range of motion of ankle to prevent euqanis.    - NWB      -PT/OT, home health , Patient is responsible to establish and continue care      PAIN MEDICATION:   - Multimodal pain control  - Pain medication: refill was needed, d/c oxycodone and tylenol and changed to Norco   - Pain medication refill policy provided to patient for review, yes.    - Patient was informed a multi-modal approach is used to treat their pain. With the goal to get off of narcotic pain medication and discontinue as soon as possible.   - ice and elevation to reduce pain and swelling     DVT PROPHYLAXIS:   - ASA 81 mg bid    FOLLOW UP:   - Patient will follow up in the clinic in 1 weeks, skin check and NPO with plans for surgery that day if skin is ready.       If there are any questions prior to scheduled follow up, the patient was instructed to contact the office    Discussed treatment options with patient. Operative vs non-operative treatment discussed with patient. Recommend operative intervention. Patient agreed.     - rest ice and elevation to reduce swelling.    Discussed proper and consistent elevation of extremities, above the level of the heart, while at rest, to help control/improve edema and decrease pain.  - NWB   - consents signed,    - lab, chest x-ray and EKG ordered  previously ,  results in chart  - aspirin  taking blood thinners       -Discussed COVID19 with the patient, they are aware of our current policies and procedures, were given the option of delaying surgery, and they elect to proceed.      Pre, charu, and post operative procedure and expectations were discussed.  Questions were answered. The patient has been educated and is ready to proceed with surgery.  Approximately 30 minutes was spent discussing surgical outcomes, plans, procedures, pre, charu, and post operative expectations and care. The risks, benefits and alternatives to surgery were discussed with the patient at great length.  These include bleeding, infection, vessel/nerve damage, pain, numbness, tingling, complex regional pain syndrome, hardware/surgical failure, need for further surgery, malunion, nonunion, DVT, PE, arthritis and death. He also understands that the risks of surgery may be greater for some patients due to health or lifestyle issues, such as a current condition or a history of heart disease, obesity, clotting disorders, recurrent infections, smoking, sedentary lifestyle, or noncompliance with medications, therapy, or followup. The degree of the increased risk is hard to estimate with any degree of precision.  Patient states an understanding and wishes to proceed with surgery.   All questions were answered.  No guarantees were implied or stated.  Informed consent was obtained  The patient will contact us if the have any questions, concerns, and changes in their medical condition prior to surgery.

## 2025-04-29 NOTE — ANESTHESIA PROCEDURE NOTES
Intubation    Date/Time: 4/29/2025 9:11 AM    Performed by: Altaf Chow CRNA  Authorized by: Jaya Castillo MD    Intubation:     Induction:  Intravenous    Intubated:  Postinduction    Mask Ventilation:  Easy mask    Attempts:  1    Attempted By:  CRNA    Method of Intubation:  Video laryngoscopy    Blade:  Bell 4    Laryngeal View Grade: Grade I - full view of cords      Difficult Airway Encountered?: No      Complications:  None    Airway Device:  Oral endotracheal tube    Airway Device Size:  8.0    Style/Cuff Inflation:  Cuffed (inflated to minimal occlusive pressure)    Inflation Amount (mL):  2    Tube secured:  23    Secured at:  The lips    Placement Verified By:  Capnometry and Revisualization with laryngoscopy    Complicating Factors:  None    Findings Post-Intubation:  BS equal bilateral and atraumatic/condition of teeth unchanged

## 2025-04-29 NOTE — OP NOTE
OP NOTE    DOS:  04/29/2025    Preop Dx: Complex right bicondylar tibial plateau fracture status post spanning external fixation    Right tibial tubercle fracture    Postop Dx: Complex right bicondylar tibial plateau fracture status post spanning external fixation    Right tibial tubercle fracture    Procedure: Open reduction internal fixation right bicondylar tibial plateau fracture - 37268    Open reduction internal fixation right tibial tubercle - 63697    Removal, under anesthesia of external fixation right lower extremity - 20680    Surgeon: Marshal Manuel M.D.    Asst:  Lucila Martin M.D    Anesthesia: GETA    EBL:  300 cc    IVF:  2000 cc crystalloid    Implants: Synthes medial and lateral tibial plateau plate.  Posteromedial tibial plateau plate.  Screws in tubercle.    Specimens: None    Tourniquet time: 120 minutes    Findings: Significant comminution.  Good alignment and fixation.    Dispo:  To PACU extubated/stable with soft compartments       Indications for Procedure:      54-year-old male status post fall coming out of a truck sustaining a complex right bicondylar tibial plateau fracture also with a free fracture of the tibial tuberosity at the patellar tendon insertion and distally who was treated initially with spanning external fixation.  We are now returning to the operating room for definitive fixation.  The risks, benefits and alternatives to surgery were discussed the patient prior to going the operating room.  Informed consent was obtained.      I am pending 0.22 modifier to this case given the complexity of the fracture, the need for multiple plate fixation in the extra time and work involved.    Procedure in Detail:    Patient was identified in preoperative holding area.  A perineural catheter was placed proximally.  The patient was wheeled to the operating room placed on the operating table in supine position.  General endotracheal anesthesia was induced and preoperative antibiotics were  administered to include Ancef and clindamycin.  Vancomycin was also added.  A time-out was undertaken to confirm patient, side, site, surgery, surgeon and administration of preoperative antibiotics and TXA.  All agreed we proceeded.    At this point I brought in fluoroscopy and I used a distractor clamps on the external fixator to length and and distract the proximal tibia somewhat to make surgery easier.  I did not distract at the index procedure given the patient's swelling at that time and superior for compartment syndrome.  The leg was then prepped and draped in sterile fashion after placing a nonsterile tourniquet.  Leg was exsanguinated and the tourniquet was raised.      I began on the lateral side making an oblique incision and dissected through the skin subcutaneous tissues.  I dissected down to the level of the iliotibial band and tibialis anterior fascia.  I incised through these centered on Gerdy's tubercle and elevated anteriorly and posteriorly.  I elevated the tibialis anterior origin off of the lateral tibial plateau.  I identified the very anterior fracture line as well as the fracture line in the middle of the lateral plateau.  At this point I turned my attention medially.      I made an oblique incision in the medial to posteromedial aspect of the tibia the skin subcutaneous tissues.  The patient had significant tissue damage on the side.  The fracture had come through some of the deep fascia.  The pes tendons were still intact and I incised those in line with the remainder of the damage from the injury.  Dissected down to the level of the fractures.  The patient had a large anteromedial fragment which was displaced anteriorly and then some comminution posteriorly which stretched along the posterior tibial plateau all the way to the lateral side.  The patient also had a displaced fracture of the tibial tuberosity below the plateau.  I dissected bluntly around the posteromedial plateau and used  chandlers to gain access to that area and keep the vascular structures safe.    I used several large clamps to reduce the anteromedial fragment back to the shaft at the metaphysis as well as smaller clamps on the posteromedial corner.  I placed several 2 mm K-wires to provisionally hold these in place.  At this point I fashioned a 1/3 tubular nonlocking plate to sit in the posteromedial corner and buttress those fragments.      The patient also had a displaced tibial tubercle fracture.  I made a small anterior incision overlying the tibial tubercle and placed a clamp from the tibial tubercle to the posterior aspect of the tibia in order to reduce this prior to any plateau fixation which may inhibit its reduction later.    I placed a single cortical screw distally in the posteromedial 1/3 tubular plate and then a 2nd screw in the flare of the metaphysis as an antiglide to the posteromedial fragments.  I aimed this toward the tibial tubercle in order to also gain some fixation of the tibial tubercle fracture from posterior to anterior.  I was able to successfully do this.  I then went back through my small anterior incision distal to the joint line and placed another anterior to posterior screw in the tibial tubercle gaining very good purchase and holding this in good position.  I turned my attention back to the plateau.    With the posteromedial plate now in place I was able to remove several of the K-wires on the medial side and placed a medial plate.  I placed a cortical screw distally after putting the plate in its appropriate height and then affixed this with a K-wire proximally until I could address the comminution of the lateral side.    I turned back to the lateral plateau.  I performed a submeniscal arthrotomy and tagged the lateral meniscus which was slightly degenerative but overall intact with 2 0 Vicryl sutures in horizontal mattress fashion.  I then suctioned the hemarthrosis out of the joint.  I used a  [place] to gain entrance in the more central or lateral fracture line as the more anterior fracture line was very close to the patellar tendon and booking the entire lateral plateau open from that point would have made for a very difficult time getting it back into position.  I was able to see the joint line through the submeniscal arthrotomy and I used a curved tamp to elevate the depressions which were comminuted back up to an acceptable level.  At this point I used 2 cc of montage calcium phosphate to fill in the void from that depression.      At this point I slid my lateral tibial plateau plate with 2 shuttle sutures for the meniscus down the lateral plateau to its appropriate height.  The patient was very large and I placed the plate at a level where it fit well on the bone.  A little bit below the rim.  I then used a periarticular clamp between the medial T-plate and the lateral plate to compress the joint in the sagittal plane.  I then placed a bicortical conical screw in the 2nd hole in the proximal leg of the plate getting very good compression across the joint surface.  I then checked my position and a lateral fluoroscopic view to ensure the plate was aligned well with the bone distally.  With the clamp still in place I then placed 3 locking screws underneath the joint surface followed by a cortical screw just distal to the metaphysis followed by another distal cortical screw through a small stab incision.  I then turned my attention back medially.    After assuring that the rotational my medial plate was still good in the plate was compressed against bone by the clamp I placed locking screws in the T of the top of the plate followed by another kickstand locking screw and removed the remainder of the K-wires.  I then turned my attention back to the lateral side and placed a kickstand screw in the lateral plate and removed the initial long bicortical conical screw and replaced this with a unicortical locking  screw.    I visualized the entire construct nail and had very good fixation of both the medial and lateral plateau with a good calcium phosphate augmentation and good fixation of the tibial tubercle fracture.    I now deconstructed the bars and clamps from the external fixator removed the pins from the tibia and the femur.  I cleansed the entire area with Betadine and then changed gloves.  The tourniquet had been let down and hemostasis was obtained with electrocautery.  The wounds and the joint were copiously irrigated with normal saline solution.    I then pulled the lateral meniscus sutures through the plate using the shuttled sutures I had placed earlier.  I tied this over a good plate bridge.  On the lateral side I closed the tibialis anterior fascia and the iliotibial band with 0 Vicryl suture in interrupted figure-of-eight fashion over vancomycin and cefepime powder.  The next layer fascia was closed 0 Vicryl suture, the subcutaneous tissue with 2-0 Vicryl suture and the skin with skin staples.  The anterior wound for tibial tubercle fixation was closed with 3-0 Vicryl suture in the subcutaneous tissue and staples in the skin.  On the medial side there was significant soft tissue damage on the way in.  I was able to pull the more proximal posterior portion of the pes tendons and some of that tissue up through plate holes to gain some coverage over the distal portion of the plate with a T still exposed.  I then closed the fascia with 0 Vicryl suture over vancomycin cefepime powder, the subcutaneous tissue with 2-0 Vicryl suture and the skin with skin staples.    Given its proven efficacy in decreasing wound breakdown and improving blood flow to the skin I elected to place a wound VAC.  I thought that the Prevena restore 2 week wound VAC designed for the ankle would work well as an upside-down U in this area.  To avoid tension on the knee with motion we flexed the knee and I took it through a range of motion as  it was stiff from the external fixator anyway.  With it flexed I placed this in an upside-down U covering all the surgical incisions quite well.  A good suction seal was obtained.  The external fixator pin sites were cleansed and covered with Mepilex dressings.    All instrument and sponge counts were reported correct at the end of the case.  There were no complications.  The patient was extubated, awakened and taken to the recovery room in stable condition with soft leg compartments.      Plan the patient:     Range of motion from 0-90.  We will go beyond 90 after 3-4 weeks to allow some healing of the tibial tubercle.  Nonweightbearing right lower extremity with crutch or walker ambulation.  81 mg aspirin DVT prophylaxis for 6 to 8 weeks.  Multimodal pain management limiting narcotics beginning with the perineural.  Given his comminution and then the severity of his fracture, we will hold off on weight-bearing for likely 8-10 weeks postoperative.  X-rays of the right knee at all follow up visits.  Of note, the patient had what appears to be like a psoriatic plaque posterior to the lateral incision and his soft tissue had undergone significant damage with the injury so I am also going to place him on an extended Duricef protocol for antibiotics.        Marshal Manuel MD

## 2025-04-29 NOTE — INTERVAL H&P NOTE
The patient has been examined and the H&P has been reviewed:    No significant change in history and physical was in the skin swelling has calmed down considerably.  Patient does have a plica what appears to be psoriasis in the lateral side slightly posterior to where I need to go.  I will cover this with the [place].  I did tell him this is an increased risk for deep infection.  It does not look like something that can be cleared prior to any intervention.    The risks, benefits and alternatives to surgery were discussed with the patient at great length.  These include bleeding, infection, vessel/nerve damage, pain, numbness, tingling, complex regional pain syndrome, hardware/surgical failure, need for further surgery, malunion, nonunion, compartment syndrome requiring fasciotomies, DVT, PE, arthritis and death.  We discussed the fact that he has severe comminution of his fracture and a high likelihood of some settling and likely valgus and posttraumatic arthritis.  I discussed that this is a fracture we would likely recommend future hardware removal in preparation for total knee.  Patient states an understanding and wishes to proceed with surgery.   All questions were answered.  No guarantees were implied or stated.  Informed consent was obtained.            Active Hospital Problems    Diagnosis  POA    *Closed bicondylar fracture of right tibial plateau [S82.584A]  Yes      Resolved Hospital Problems   No resolved problems to display.

## 2025-04-29 NOTE — ANESTHESIA PROCEDURE NOTES
R high adductor PNC    Patient location during procedure: pre-op   Block not for primary anesthetic.  Reason for block: at surgeon's request and post-op pain management   Post-op Pain Location: Right leg pain   Start time: 4/29/2025 8:15 AM  Timeout: 4/29/2025 8:15 AM   End time: 4/29/2025 8:30 AM    Staffing  Authorizing Provider: Arias Willard MD  Performing Provider: Sonny Patel DO    Staffing  Performed by: Sonny Patel DO  Authorized by: Arias Willard MD    Preanesthetic Checklist  Completed: patient identified, IV checked, site marked, risks and benefits discussed, surgical consent, monitors and equipment checked, pre-op evaluation and timeout performed  Peripheral Block  Patient position: supine  Prep: ChloraPrep and site prepped and draped  Patient monitoring: heart rate, cardiac monitor, continuous pulse ox, continuous capnometry and frequent blood pressure checks  Block type: adductor canal  Laterality: right  Injection technique: continuous  Needle  Needle type: Tuohy   Needle gauge: 17 G  Needle length: 3.5 in  Needle localization: anatomical landmarks and ultrasound guidance  Catheter type: spring wound  Catheter size: 19 G  Catheter at skin depth: 8 cm  Test dose: lidocaine 1.5% with Epi 1-to-200,000 and negative   -ultrasound image captured on disc.  Assessment  Injection assessment: negative aspiration, negative parasthesia and local visualized surrounding nerve  Paresthesia pain: none  Heart rate change: no  Slow fractionated injection: yes  Pain Tolerance: comfortable throughout block  Medications:    Medications: ropivacaine (NAROPIN) injection 0.5% - Perineural   7.5 mL - 4/29/2025 8:30:00 AM    Additional Notes  VSS.  [facility] RN monitoring vitals throughout procedure.  Patient tolerated procedure well.  A time out was conducted. Site inga confirmed with team and patient. Allergies reviewed.   Vital signs stable throughout block. RN monitoring vitals throughout.   Needle advanced under  continuous ultrasound guidance.  Local injected incrementally after confirming negative aspiration. No signs or symptoms of intravascular or intraneural injection noted.   No persistent paresthesias elicited or expressed. Patient tolerated procedure well.  15cc of 0.25% Ropivacaine  with epinephrine 1:300K used for the block.

## 2025-04-30 LAB
POCT GLUCOSE: 188 MG/DL (ref 70–110)
POCT GLUCOSE: 221 MG/DL (ref 70–110)
POCT GLUCOSE: 242 MG/DL (ref 70–110)

## 2025-04-30 PROCEDURE — 25000003 PHARM REV CODE 250

## 2025-04-30 PROCEDURE — 97116 GAIT TRAINING THERAPY: CPT

## 2025-04-30 PROCEDURE — 97530 THERAPEUTIC ACTIVITIES: CPT

## 2025-04-30 PROCEDURE — 63600175 PHARM REV CODE 636 W HCPCS: Performed by: STUDENT IN AN ORGANIZED HEALTH CARE EDUCATION/TRAINING PROGRAM

## 2025-04-30 PROCEDURE — 97162 PT EVAL MOD COMPLEX 30 MIN: CPT

## 2025-04-30 PROCEDURE — 97165 OT EVAL LOW COMPLEX 30 MIN: CPT

## 2025-04-30 PROCEDURE — 25000003 PHARM REV CODE 250: Performed by: STUDENT IN AN ORGANIZED HEALTH CARE EDUCATION/TRAINING PROGRAM

## 2025-04-30 PROCEDURE — 97535 SELF CARE MNGMENT TRAINING: CPT

## 2025-04-30 PROCEDURE — 21400001 HC TELEMETRY ROOM

## 2025-04-30 RX ORDER — METHOCARBAMOL 500 MG/1
1000 TABLET, FILM COATED ORAL EVERY 8 HOURS
Status: DISCONTINUED | OUTPATIENT
Start: 2025-04-30 | End: 2025-05-01 | Stop reason: HOSPADM

## 2025-04-30 RX ADMIN — CEFAZOLIN 2 G: 2 INJECTION, POWDER, FOR SOLUTION INTRAMUSCULAR; INTRAVENOUS at 08:04

## 2025-04-30 RX ADMIN — CELECOXIB 200 MG: 200 CAPSULE ORAL at 08:04

## 2025-04-30 RX ADMIN — GABAPENTIN 100 MG: 100 CAPSULE ORAL at 02:04

## 2025-04-30 RX ADMIN — ASPIRIN 81 MG: 81 TABLET, COATED ORAL at 08:04

## 2025-04-30 RX ADMIN — PANTOPRAZOLE SODIUM 40 MG: 40 TABLET, DELAYED RELEASE ORAL at 08:04

## 2025-04-30 RX ADMIN — INSULIN ASPART 2 UNITS: 100 INJECTION, SOLUTION INTRAVENOUS; SUBCUTANEOUS at 07:04

## 2025-04-30 RX ADMIN — ATORVASTATIN CALCIUM 10 MG: 10 TABLET, FILM COATED ORAL at 08:04

## 2025-04-30 RX ADMIN — OXYCODONE 5 MG: 5 TABLET ORAL at 10:04

## 2025-04-30 RX ADMIN — ACETAMINOPHEN 1000 MG: 500 TABLET ORAL at 11:04

## 2025-04-30 RX ADMIN — CEFADROXIL 500 MG: 500 CAPSULE ORAL at 08:04

## 2025-04-30 RX ADMIN — GABAPENTIN 100 MG: 100 CAPSULE ORAL at 08:04

## 2025-04-30 RX ADMIN — ASPIRIN 81 MG: 81 TABLET, COATED ORAL at 09:04

## 2025-04-30 RX ADMIN — GABAPENTIN 100 MG: 100 CAPSULE ORAL at 09:04

## 2025-04-30 RX ADMIN — INSULIN ASPART 8 UNITS: 100 INJECTION, SOLUTION INTRAVENOUS; SUBCUTANEOUS at 04:04

## 2025-04-30 RX ADMIN — INSULIN ASPART 4 UNITS: 100 INJECTION, SOLUTION INTRAVENOUS; SUBCUTANEOUS at 11:04

## 2025-04-30 RX ADMIN — METHOCARBAMOL 1000 MG: 500 TABLET ORAL at 09:04

## 2025-04-30 RX ADMIN — ACETAMINOPHEN 1000 MG: 500 TABLET ORAL at 05:04

## 2025-04-30 RX ADMIN — CEFAZOLIN 2 G: 2 INJECTION, POWDER, FOR SOLUTION INTRAMUSCULAR; INTRAVENOUS at 12:04

## 2025-04-30 RX ADMIN — METHOCARBAMOL 1000 MG: 500 TABLET ORAL at 02:04

## 2025-04-30 RX ADMIN — CEFADROXIL 500 MG: 500 CAPSULE ORAL at 09:04

## 2025-04-30 RX ADMIN — ACETAMINOPHEN 1000 MG: 500 TABLET ORAL at 12:04

## 2025-04-30 RX ADMIN — METFORMIN HYDROCHLORIDE 500 MG: 500 TABLET ORAL at 07:04

## 2025-04-30 RX ADMIN — METHOCARBAMOL 750 MG: 750 TABLET ORAL at 05:04

## 2025-04-30 RX ADMIN — INSULIN ASPART 2 UNITS: 100 INJECTION, SOLUTION INTRAVENOUS; SUBCUTANEOUS at 09:04

## 2025-04-30 NOTE — SUBJECTIVE & OBJECTIVE
"Principal Problem:Closed bicondylar fracture of right tibial plateau    Principal Orthopedic Problem: same sp ORIF 4/29/25    Interval History: seen and evaluated. ALBER BRUCE Reports intense pain at surgical site. Says he thinks someone from the regional team just came and bolused his catheter. Has been elevating and icing. His mother is bedside     Review of patient's allergies indicates:  No Known Allergies    Current Facility-Administered Medications   Medication    acetaminophen tablet 1,000 mg    aspirin EC tablet 81 mg    atorvastatin tablet 10 mg    cefadroxil capsule 500 mg    ceFAZolin 2 g    celecoxib capsule 200 mg    dextrose 50% injection 12.5 g    dextrose 50% injection 25 g    gabapentin capsule 100 mg    glucagon (human recombinant) injection 1 mg    glucose chewable tablet 16 g    glucose chewable tablet 24 g    insulin aspart U-100 pen 0-10 Units    LIDOcaine (PF) 10 mg/ml (1%) injection 10 mg    melatonin tablet 6 mg    metFORMIN tablet 500 mg    methocarbamoL tablet 750 mg    ondansetron disintegrating tablet 8 mg    oxyCODONE immediate release tablet 5 mg    pantoprazole EC tablet 40 mg    polyethylene glycol packet 17 g    ropivacaine 0.2% Perineural Pump infusion 500 ML    senna-docusate 8.6-50 mg per tablet 1 tablet    sodium chloride 0.9% flush 10 mL     Objective:     Vital Signs (Most Recent):  Temp: 98 °F (36.7 °C) (04/30/25 0442)  Pulse: 110 (04/30/25 0442)  Resp: 17 (04/30/25 0442)  BP: 135/80 (04/30/25 0442)  SpO2: 97 % (04/30/25 0442) Vital Signs (24h Range):  Temp:  [97.7 °F (36.5 °C)-99.5 °F (37.5 °C)] 98 °F (36.7 °C)  Pulse:  [] 110  Resp:  [10-20] 17  SpO2:  [94 %-100 %] 97 %  BP: (123-158)/() 135/80     Weight: 124.7 kg (274 lb 14.6 oz)  Height: 6' 4" (193 cm)  Body mass index is 33.46 kg/m².      Intake/Output Summary (Last 24 hours) at 4/30/2025 0803  Last data filed at 4/30/2025 0549  Gross per 24 hour   Intake 2700 ml   Output 2200 ml   Net 500 ml         Alert, " oriented, NAD  Respirations unlabored        Ortho/SPM Exam  RLE exam  Elevated w ice right proximal tibia using tarik pad barrier  Prevena restore in place w good seal, no output  Pin sites dressed   Compartments soft and compressible  No pain w passive ROM ankle/toes  Active PF/DF intact ankle, toes  SILT   DP 2+     Significant Labs: None    Significant Imaging: I have reviewed and interpreted all pertinent imaging results/findings.

## 2025-04-30 NOTE — PT/OT/SLP EVAL
Occupational Therapy   Co-Evaluation  Co-treatment performed due to patient's multiple deficits requiring two skilled therapists to appropriately and safely assess patient's strength and endurance while facilitating functional tasks in addition to accommodating for patient's activity tolerance.      Name: Shlomo Wallace  MRN: [id number]  Admitting Diagnosis: Closed bicondylar fracture of right tibial plateau  Recent Surgery: Procedure(s) (LRB):  ORIF, FRACTURE, TIBIAL PLATEAU (Right)  REMOVAL, EXTERNAL FIXATION DEVICE (Right)  ORIF, FRACTURE, TIBIAL TUBEROSITY (Right) 1 Day Post-Op    Recommendations:     Discharge Recommendations: Low Intensity Therapy  Discharge Equipment Recommendations:  bedside commode, shower chair, walker, rolling  Barriers to discharge:  None    Assessment:     Shlomo Wallace is a 54 y.o. male with a medical diagnosis of Closed bicondylar fracture of right tibial plateau.  He presents with good participation and motivation. Pt reports utilizing a wheelchair for household mobility since onset of symptoms PTA. Education provided on weightbearing precautions with pt demonstrating good adherence. Performance deficits affecting function: weakness, impaired endurance, impaired self care skills, impaired functional mobility, decreased lower extremity function, gait instability, impaired balance, decreased coordination, decreased safety awareness, pain, decreased ROM, impaired skin, edema, orthopedic precautions.  Patient continues to demonstrate the need for low intensity therapy on a scheduled basis exhibited by decreased independence with self-care and functional mobility     Rehab Prognosis: Good; patient would benefit from acute skilled OT services to address these deficits and reach maximum level of function.       Plan:     Patient to be seen 4 x/week to address the above listed problems via self-care/home management, therapeutic activities, therapeutic exercises, neuromuscular  "re-education  Plan of Care Expires: 05/30/25  Plan of Care Reviewed with: patient, mother    Subjective   "It hurts"  Chief Complaint: R LE pain  Patient/Family Comments/goals: to return to PLOF; to d/c home    Occupational Profile:  Living Environment: Pt lives with his mother in a H with 1 ROBERT. W-I-S with grab bars  Previous level of function: Independent  Roles and Routines: (+) drives; works  Equipment Used at Home: wheelchair, walker, standard  Assistance upon Discharge: mother    Pain/Comfort:  Pain Rating 1: 0/10 (at rest)  Location - Side 1: Right  Location - Orientation 1: generalized  Location 1: leg  Pain Addressed 1: Pre-medicate for activity, Reposition, Distraction  Pain Rating Post-Intervention 1: 10/10 (with functional mobility)    Patients cultural, spiritual, Christian conflicts given the current situation: no    Objective:   Additional staff present:  NUPUR Lewis    Communicated with: RN prior to session.  Patient found HOB elevated with cryotherapy, FCD, perineural catheter, wound vac upon OT entry to room.    General Precautions: Standard, fall  Orthopedic Precautions: RLE non weight bearing  Braces: N/A  Respiratory Status: Room air    Occupational Performance:    Bed Mobility:    Patient completed Scooting/Bridging with stand by assistance  Patient completed Supine to Sit with minimum assistance    Functional Mobility/Transfers:  Patient completed Sit <> Stand Transfer with contact guard assistance  with  rolling walker   Patient completed Bed <> Chair Transfer using Step Transfer technique with contact guard assistance with rolling walker  Functional Mobility: Pt engaged in functional mobility to simulate household/community distances 12 ft with CGA and RW in order to maximize functional endurance and standing balance required for engagement in occupations of choice   No LOB or SOB noted  Pt demo'd good adherence to NWB precautions    Activities of Daily Living:  Upper Body Dressing: stand " by assistance donning [facility] gown over back  Lower Body Dressing: moderate assistance required to pull R sock above heel EOB    Cognitive/Visual Perceptual:  Cognitive/Psychosocial Skills:     -       Oriented to: Person, Place, Time, and Situation   -       Follows Commands/attention:Follows two-step commands  -       Safety awareness/insight to disability: impaired   -       Mood/Affect/Coping skills/emotional control: Cooperative    Physical Exam:  Sensation:    -       Intact  Upper Extremity Range of Motion:     -       Right Upper Extremity: WFL  -       Left Upper Extremity: WFL  Upper Extremity Strength:    -       Right Upper Extremity: WFL  -       Left Upper Extremity: WFL    AMPAC 6 Click ADL:  AMPAC Total Score: 16    Treatment & Education:  -Education on weightbearing precautions  -Education on energy conservation and task modification to maximize safety and (I) during ADLs and mobility  -Education on importance of OOB activity to improve overall activity tolerance and promote recovery  -Pt educated to call for assistance and to transfer with hospital staff only  -Provided education regarding role of OT, POC, & discharge recommendations with pt and pt's mother verbalizing understanding.  Pt had no further questions & when asked whether there were any concerns pt reported none.     Patient left up in chair with all lines intact, call button in reach, RN notified, and pt's mother present    GOALS:   Multidisciplinary Problems       Occupational Therapy Goals          Problem: Occupational Therapy    Goal Priority Disciplines Outcome Interventions   Occupational Therapy Goal     OT, PT/OT Progressing    Description: Goals to be met by: 5/30/25    Patient will increase functional independence with ADLs by performing:    LE Dressing with Stand-by Assistance and AE prn.  Grooming while seated with Modified [place].  Toileting from toilet/bedside commode with Stand-by Assistance for hygiene and  clothing management.   Supine to sit with Supervision..  Step transfer with Supervision and LRAD  Toilet transfer to toilet/bedside commode with Supervision and LRAD.                         DME Justifications:   Shlomo requires a commode for home use because he is confined to a single room.   Shlomo's mobility limitation cannot be sufficiently resolved by the use of a cane. His functional mobility deficit can be sufficiently resolved with the use of a Rolling Walker. Patient's mobility limitation significantly impairs their ability to participate in one of more activities of daily living.  The use of a RW will significantly improve the patient's ability to participate in MRADLS and the patient will use it on regular basis in the home.    History:     Past Medical History:   Diagnosis Date    Diabetes mellitus, type 2          Past Surgical History:   Procedure Laterality Date    APPLICATION, EXTERNAL FIXATION DEVICE, LOWER EXTREMITY Right 4/17/2025    Procedure: APPLICATION, EXTERNAL FIXATION DEVICE, LOWER EXTREMITY - RIGHT;  Surgeon: Marshal Manuel MD;  Location: [address];  Service: Orthopedics;  Laterality: Right;    OPEN REDUCTION AND INTERNAL FIXATION (ORIF) OF FRACTURE OF TIBIAL PLATEAU Right 4/29/2025    Procedure: ORIF, FRACTURE, TIBIAL PLATEAU;  Surgeon: Marshal Manuel MD;  Location: [address];  Service: Orthopedics;  Laterality: Right;    ORIF, FRACTURE, TIBIAL TUBEROSITY Right 4/29/2025    Procedure: ORIF, FRACTURE, TIBIAL TUBEROSITY;  Surgeon: Marshal Manuel MD;  Location: [address];  Service: Orthopedics;  Laterality: Right;    REMOVAL OF EXTERNAL FIXATION DEVICE Right 4/29/2025    Procedure: REMOVAL, EXTERNAL FIXATION DEVICE;  Surgeon: Marshal Manuel MD;  Location: [address];  Service: Orthopedics;  Laterality: Right;       Time Tracking:     OT Date of Treatment: 04/30/25  OT Start Time: 0955  OT Stop Time: 1025  OT Total Time (min): 30 min    Billable  Minutes:Evaluation 7  Self Care/Home Management 10  Therapeutic Activity 13    4/30/2025

## 2025-04-30 NOTE — PLAN OF CARE
OT eval complete. OT POC and goals established.   Problem: Occupational Therapy  Goal: Occupational Therapy Goal  Description: Goals to be met by: 5/30/25    Patient will increase functional independence with ADLs by performing:    LE Dressing with Stand-by Assistance and AE prn.  Grooming while seated with Modified [place].  Toileting from toilet/bedside commode with Stand-by Assistance for hygiene and clothing management.   Supine to sit with Supervision..  Step transfer with Supervision and LRAD  Toilet transfer to toilet/bedside commode with Supervision and LRAD.    Outcome: Progressing

## 2025-04-30 NOTE — PROGRESS NOTES
[facility]  Orthopedics  Progress Note    Attg Note:  Patient seen and examined.  I agree with the resident's assessment and plan.  Significant pain this morning.  Compartments soft.  Large surgery.  APS dosed catheter and he is improving.  Will keep tonight for pain control and d/c in AM.  Changed to obs status.     Marshal Manuel MD      Patient Name: Shlomo Wallace  MRN: [id number]  Admission Date: 4/29/2025  Hospital Length of Stay: 0 days  Attending Provider: Marshal Manuel MD  Primary Care Provider: Cindy Castillo MD  Follow-up For: Procedure(s) (LRB):  ORIF, FRACTURE, TIBIAL PLATEAU (Right)  REMOVAL, EXTERNAL FIXATION DEVICE (Right)  ORIF, FRACTURE, TIBIAL TUBEROSITY (Right)    Post-Operative Day: 1 Day Post-Op  Subjective:     Principal Problem:Closed bicondylar fracture of right tibial plateau    Principal Orthopedic Problem: same sp ORIF 4/29/25    Interval History: seen and evaluated. NAEON. VSS. , 317 last night; on sliding scale insulin, improved to 188 this morning. Reports intense pain at surgical site. Has been getting scheduled nonnarcotic multimodals, has not had any prn narcotics. Says he thinks someone from the regional team just came and bolused his catheter. Has been elevating and icing. His mother is bedside     Review of patient's allergies indicates:  No Known Allergies    Current Facility-Administered Medications   Medication    acetaminophen tablet 1,000 mg    aspirin EC tablet 81 mg    atorvastatin tablet 10 mg    cefadroxil capsule 500 mg    ceFAZolin 2 g    celecoxib capsule 200 mg    dextrose 50% injection 12.5 g    dextrose 50% injection 25 g    gabapentin capsule 100 mg    glucagon (human recombinant) injection 1 mg    glucose chewable tablet 16 g    glucose chewable tablet 24 g    insulin aspart U-100 pen 0-10 Units    LIDOcaine (PF) 10 mg/ml (1%) injection 10 mg    melatonin tablet 6 mg    metFORMIN tablet 500 mg    methocarbamoL tablet 750 mg    ondansetron  "disintegrating tablet 8 mg    oxyCODONE immediate release tablet 5 mg    pantoprazole EC tablet 40 mg    polyethylene glycol packet 17 g    ropivacaine 0.2% Perineural Pump infusion 500 ML    senna-docusate 8.6-50 mg per tablet 1 tablet    sodium chloride 0.9% flush 10 mL     Objective:     Vital Signs (Most Recent):  Temp: 98 °F (36.7 °C) (04/30/25 0442)  Pulse: 110 (04/30/25 0442)  Resp: 17 (04/30/25 0442)  BP: 135/80 (04/30/25 0442)  SpO2: 97 % (04/30/25 0442) Vital Signs (24h Range):  Temp:  [97.7 °F (36.5 °C)-99.5 °F (37.5 °C)] 98 °F (36.7 °C)  Pulse:  [] 110  Resp:  [10-20] 17  SpO2:  [94 %-100 %] 97 %  BP: (123-158)/() 135/80     Weight: 124.7 kg (274 lb 14.6 oz)  Height: 6' 4" (193 cm)  Body mass index is 33.46 kg/m².      Intake/Output Summary (Last 24 hours) at 4/30/2025 0803  Last data filed at 4/30/2025 0549  Gross per 24 hour   Intake 2700 ml   Output 2200 ml   Net 500 ml         Alert, oriented, NAD  Respirations unlabored        Ortho/SPM Exam  RLE exam  Elevated w ice right proximal tibia using tarik pad barrier  Prevena restore in place w good seal, no output  Pin sites dressed   Compartments soft and compressible  No pain w passive ROM ankle/toes  Active PF/DF intact ankle, toes  SILT   DP 2+     Significant Labs: None    Significant Imaging: I have reviewed and interpreted all pertinent imaging results/findings.  Assessment/Plan:     * Closed bicondylar fracture of right tibial plateau  54M fall 3 ft from truck 4/17/25 with bicondylar right tibial plateau fracture    -ex fix 4/17/25  -ex fix removal and ORIF 4/29/25    Reports poor pain control this AM. I spoke w the regional team and they are working on adjusting his PNC and managing his pain.    Admitted to extended recovery under orthopedic surgery Dr aMnuel  Multimodal pain regimen, PNC  NWB RLE, ROM 0-90 at knee   Elevate RLE and ice right proximal tibia while at rest  PT/OT today  Dvt ppx w SCDs, ASA 81mg bid   Prevena restore " right knee (14d life)  Change pin site dressings prn  Extended oral antibiotic prophylaxis with cefadroxil 500mg bid x 1 week  DC today after PT/OT as long as pain controlled  FU 2 wks post op for           Lucila Martin MD  Orthopedics  [facility] - Surgery

## 2025-04-30 NOTE — ANESTHESIA POST-OP PAIN MANAGEMENT
Acute Pain Service Progress Note    Shlomo Wallace is a 54 y.o., male, [id number].    Surgery:  R ORIF Tibial plateau fracture    Post Op Day #: 1    Catheter type: perineural  adductor    Infusion type: Ropivacaine 0.2%  10cc q3h basal and 5cc q30min demand    Problem List:    Active Hospital Problems    Diagnosis  POA    *Closed bicondylar fracture of right tibial plateau [S82.141A]  Yes    Closed fracture of right tibial tuberosity [S82.151A]  Yes      Resolved Hospital Problems   No resolved problems to display.       Subjective:     General appearance of alert, oriented, no complaints   Pain with rest: 10    Numbers   Pain with movement: 10    Numbers   Side Effects    1. Pruritis No    2. Nausea No    3. Motor Blockade No, 0=Ability to raise lower extremities off bed    4. Sedation No, 1=awake and alert    Objective:     Catheter site clean, dry, intact      Vitals   Vitals:    04/30/25 0816   BP: 131/81   Pulse: 100   Resp: 18   Temp: 36.7 °C (98 °F)        Labs    Admission on 04/29/2025   Component Date Value Ref Range Status    POCT Glucose 04/29/2025 219 (H)  70 - 110 mg/dL Final    POCT Glucose 04/29/2025 270 (H)  70 - 110 mg/dL Final    POCT Glucose 04/29/2025 317 (H)  70 - 110 mg/dL Final    POCT Glucose 04/29/2025 312 (H)  70 - 110 mg/dL Final    POCT Glucose 04/30/2025 188 (H)  70 - 110 mg/dL Final        Meds Current Medications[1]    Assessment:     Pain control inadequate. Patient complained of 10/10 the entire night which was unfortunately not communicated to the nursing staff.  As a result, he did not receive any prn narcotics either. Bolused 20cc of 0.2% ropivacaine this AM prior to rounds. Patient says pain decreased from 10/10 to 6/10 pain after 45 minutes. Several hours later on rounds he claims that his pain is 0-1/10.    Plan:       1) Continue R adductor PNC at 10cc q3h IB with 5cc q30 min demand bolus. Will evaluate need to provide additional boluses moving forward.  2) Continue  multimodals with tylenol 1g q6h, robaxin 1 q8h, celecoxib 200mg daily, gabapentin 100mg TID  3) Continue prn narcotics with oxy 5mg q3h prn for mod/severe pain  4) Emphasized to patient to inform nurse of any pain issues     Plan as stated above discussed with Dr. Aceves. Thank you for consulting the pain management team. Please reach out to the acute pain/anesthesia team with any further questions or concerns.    Micheal Langford MD  PGY3 Anesthesiology      Pt seen and examined with resident.  Resident note reviewed.  Agree with findings, assessment, and plan.    Milan Aceves M.D.       [1]   Current Facility-Administered Medications   Medication Dose Route Frequency Provider Last Rate Last Admin    acetaminophen tablet 1,000 mg  1,000 mg Oral Q6H Lucila Martin MD   1,000 mg at 04/30/25 0543    aspirin EC tablet 81 mg  81 mg Oral BID Lucila Martin MD   81 mg at 04/30/25 0824    atorvastatin tablet 10 mg  10 mg Oral Daily Lucila Martin MD   10 mg at 04/30/25 0824    cefadroxil capsule 500 mg  500 mg Oral Q12H Lucila Martin MD   500 mg at 04/30/25 0824    celecoxib capsule 200 mg  200 mg Oral Daily Lucila Martin MD   200 mg at 04/30/25 0824    dextrose 50% injection 12.5 g  12.5 g Intravenous PRN Lucila Martin MD        dextrose 50% injection 25 g  25 g Intravenous PRN Lucila Martin MD        gabapentin capsule 100 mg  100 mg Oral TID Lucila Martin MD   100 mg at 04/30/25 0825    glucagon (human recombinant) injection 1 mg  1 mg Intramuscular PRN Lucila Martin MD        glucose chewable tablet 16 g  16 g Oral PRN Lucila Martin MD        glucose chewable tablet 24 g  24 g Oral PRN Lucila Martin MD        insulin aspart U-100 pen 0-10 Units  0-10 Units Subcutaneous QID (AC + HS) PRN Lucila Martin MD   2 Units at 04/30/25 0713    LIDOcaine (PF) 10 mg/ml (1%) injection 10 mg  1 mL Intradermal Once PRN Lucila Martin MD        melatonin tablet 6 mg  6 mg Oral  Nightly PRN Lucila Martin MD        metFORMIN tablet 500 mg  500 mg Oral Daily with breakfast Lucila Martin MD   500 mg at 04/30/25 0748    methocarbamoL tablet 750 mg  750 mg Oral Q8H Lucila Martin MD   750 mg at 04/30/25 0543    ondansetron disintegrating tablet 8 mg  8 mg Oral Q8H PRN Lucila Martin MD        oxyCODONE immediate release tablet 5 mg  5 mg Oral Q3H PRN Micheal Langford MD        pantoprazole EC tablet 40 mg  40 mg Oral Daily Lucila Martin MD   40 mg at 04/30/25 0825    polyethylene glycol packet 17 g  17 g Oral Daily Micheal Langford MD        ropivacaine 0.2% Perineural Pump infusion 500 ML   Perineural Continuous Micheal Langford MD   New Bag at 04/29/25 1452    senna-docusate 8.6-50 mg per tablet 1 tablet  1 tablet Oral Daily Micheal Langford MD   1 tablet at 04/29/25 1532    sodium chloride 0.9% flush 10 mL  10 mL Intravenous PRN Lucila Martin MD

## 2025-04-30 NOTE — ADDENDUM NOTE
Addendum  created 04/30/25 1303 by Milan Aceves MD    Charge Capture section accepted, Clinical Note Signed

## 2025-04-30 NOTE — ASSESSMENT & PLAN NOTE
54M fall 3 ft from truck 4/17/25 with bicondylar right tibial plateau fracture    -ex fix 4/17/25  -ex fix removal and ORIF 4/29/25    Reports poor pain control this AM. Will touch base with regional team to see if they can adjust his PNC.    Admitted to extended recovery under orthopedic surgery Dr Manuel  Multimodal pain regimen, PNC  NWB RLE, ROM 0-90 at knee   Elevate RLE and ice right proximal tibia while at rest  PT/OT today  Dvt ppx w SCDs, ASA 81mg bid   Prevena restore right knee (14d life)  Change pin site dressings prn  DC today after PT/OT as long as pain controlled  FU 2 wks post op for

## 2025-04-30 NOTE — CARE UPDATE
Patient and mother requesting education regarding home use CADD pump. Educated using verbal, written and video approach. All questions answered. Instructed to removed PNC 5/3 at noon. Understanding verbalized.

## 2025-04-30 NOTE — PLAN OF CARE
[facility]  Initial Discharge Assessment       Primary Care Provider: Cindy Castillo MD    Admission Diagnosis: Closed bicondylar fracture of right tibial plateau [S82.141A]    Admission Date: 4/29/2025  Expected Discharge Date: 4/30/2025    Transition of Care Barriers: None    Payor: LWCC / Plan: LWCC / Product Type: Worker's Comp /     Extended Emergency Contact Information  Primary Emergency Contact: Kesha Narayan  Address: [address]  Mobile Phone: [phone]  Relation: Mother  Preferred language: English   needed? No    Discharge Plan A: Home with family, Home Health  Discharge Plan B: Home with family      [id number] [address] [facility]  [address] [facility] [address]  Phone: [phone] Fax: [phone]      Initial Assessment (most recent)       Adult Discharge Assessment - 04/30/25 1210          Discharge Assessment    Assessment Type Discharge Planning Assessment     Confirmed/corrected address, phone number and insurance Yes     Confirmed Demographics Correct on Facesheet     Source of Information patient     If unable to respond/provide information was family/caregiver contacted? Yes     Contact Name/Number Mother Elvira) # [phone]     Communicated JN with patient/caregiver Yes     People in Home parent(s)     Do you expect to return to your current living situation? Yes     Do you have help at home or someone to help you manage your care at home? Yes     Who are your caregiver(s) and their phone number(s)? Mother Elvira) # [phone]     Prior to hospitilization cognitive status: Alert/Oriented     Current cognitive status: Alert/Oriented     Walking or Climbing Stairs Difficulty no     Dressing/Bathing Difficulty no     Home Accessibility wheelchair accessible     Home Layout Able to live on 1st floor     Equipment Currently Used at Home none      Readmission within 30 days? No     Patient currently being followed by outpatient case management? No     Do you currently have service(s) that help you manage your care at home? No     Do you have prescription coverage? Yes     Coverage LWCC/LWCC     Do you have any problems affording any of your prescribed medications? No     How do you get to doctors appointments? car, drives self;family or friend will provide     Are you on dialysis? No     Do you take coumadin? No     Discharge Plan A Home with family;Home Health     Discharge Plan B Home with family     DME Needed Upon Discharge  walker, rolling;bedside commode     Discharge Plan discussed with: Patient     Transition of Care Barriers None                       SW completed discharge planning assessment with the patient at bedside. SW verified demographic information listed on the pt.'s Face sheet. Pt lives in a home with his mother (Kesha) who plans to help manage the pt's care upon discharge. Pt agreeable to home health, request RW and BSC.    SW faxed referral to Home Health via [employer] for review. SW will follow up as needed.    Discharge Plan A and Plan B have been determined by review of patient's clinical status, future medical and therapeutic needs, and coverage/benefits for post-acute care in coordination with multidisciplinary team members.      Padmini Araya LCSW  Case Management   [facility]   Ext. [phone]

## 2025-04-30 NOTE — PT/OT/SLP EVAL
Physical Therapy Co-Evaluation  Co-eval performed to appropriately and safely assess patient's strength and endurance while facilitating functional tasks in addition to accommodating for patient's activity/pain tolerance.    Patient Name:  Shlomo Wallace   MRN:  [id number]    Recommendations:     Discharge Recommendations: Low Intensity Therapy   Discharge Equipment Recommendations: bedside commode, shower chair, walker, rolling   Barriers to discharge: None    Assessment:     Shlomo Wallace is a 54 y.o. male admitted with a medical diagnosis of Closed bicondylar fracture of right tibial plateau.  He presents with the following impairments/functional limitations: weakness, impaired endurance, impaired self care skills, impaired functional mobility, gait instability, impaired balance, decreased lower extremity function, decreased safety awareness, pain, orthopedic precautions.    Rehab Prognosis: Good; patient would benefit from acute skilled PT services to address these deficits and reach maximum level of function.    Recent Surgery: Procedure(s) (LRB):  ORIF, FRACTURE, TIBIAL PLATEAU (Right)  REMOVAL, EXTERNAL FIXATION DEVICE (Right)  ORIF, FRACTURE, TIBIAL TUBEROSITY (Right) 1 Day Post-Op    Plan:     During this hospitalization, patient to be seen 4 x/week to address the identified rehab impairments via gait training, therapeutic activities, therapeutic exercises, neuromuscular re-education and progress toward the following goals:    Plan of Care Expires:  05/30/25    Subjective     Chief Complaint: pain  Patient/Family Comments/goals: pt reporting significant pain with moving LE  Pain/Comfort:  Pain Rating 1: 0/10 (at rest)  Location - Side 1: Right  Location 1: leg  Pain Addressed 1: Pre-medicate for activity, Reposition, Distraction, Nurse notified  Pain Rating Post-Intervention 1: 10/10 (with mobility)    Patients cultural, spiritual, Sikh conflicts given the current situation: no    Living  "Environment:  "Living Environment: Pt lives with his mother in a H with 1 ROBERT. W-I-S with grab bars  Previous level of function: Independent  Roles and Routines: (+) drives; works  Equipment Used at Home: wheelchair, walker, standard  Assistance upon Discharge: mother"    Objective:     Communicated with RN prior to session.  Patient found HOB elevated with cryotherapy, FCD, perineural catheter, peripheral IV, wound vac  upon PT entry to room.    General Precautions: Standard, fall  Orthopedic Precautions:RLE non weight bearing   Braces: N/A  Respiratory Status: Room air    Exams:  Cognitive Exam:  Patient is oriented to Person, Place, Time, and Situation  Gross Motor Coordination:  WFL  Sensation: intact BLEs  RLE ROM: WFL  RLE Strength: hip flexion 2/5, knee extension 2/5, knee flexion 2/5, DF 3/5  LLE ROM: WFL  LLE Strength: hip flexion 4/5, knee extension 5/5, knee flexion 5/5, DF 5/5    Functional Mobility:  Bed Mobility:     Scooting: minimum assistance  Supine to Sit: minimum assistance  Transfers:     Sit to Stand:  contact guard assistance with rolling walker  Gait: 12 ft, RW, CGA; Wbing status maintained, decreased gait speed and step length, pt reporting pain during mobility      AM-PAC 6 CLICK MOBILITY  Total Score:17       Treatment & Education:  Pt educated on Wbing status.  Patient educated on role of therapy, goals of session, and benefits of mobilizing.   Discussed PT plan of care during hospitalization.   Patient educated on calling for assistance.   Patient educated on how their diagnosis impacts their mobility within PT scope of practice.   Communication board up to date.  All questions answered within PT scope of practice.    Patient left up in chair with all lines intact, call button in reach, RN notified, and pt's mother present.    GOALS:   Multidisciplinary Problems       Physical Therapy Goals          Problem: Physical Therapy    Goal Priority Disciplines Outcome Interventions   Physical " Therapy Goal     PT, PT/OT Progressing    Description: Goals to be met by: 2025     Patient will increase functional independence with mobility by performin. Supine to sit with Supervision  2. Sit to supine with Supervision  3. Sit to stand transfer with Supervision with RW  4. Gait  x 100 feet with Supervision using Rolling Walker.   5. Lower extremity exercise program x10 reps per handout, with supervision                         DME Justifications:   Shlomo's mobility limitation cannot be sufficiently resolved by the use of a cane. His functional mobility deficit can be sufficiently resolved with the use of a Rolling Walker. Patient's mobility limitation significantly impairs their ability to participate in one of more activities of daily living.  The use of a RW will significantly improve the patient's ability to participate in MRADLS and the patient will use it on regular basis in the home.    History:     Past Medical History:   Diagnosis Date    Diabetes mellitus, type 2        Past Surgical History:   Procedure Laterality Date    APPLICATION, EXTERNAL FIXATION DEVICE, LOWER EXTREMITY Right 2025    Procedure: APPLICATION, EXTERNAL FIXATION DEVICE, LOWER EXTREMITY - RIGHT;  Surgeon: Marshal Manuel MD;  Location: [address];  Service: Orthopedics;  Laterality: Right;    OPEN REDUCTION AND INTERNAL FIXATION (ORIF) OF FRACTURE OF TIBIAL PLATEAU Right 2025    Procedure: ORIF, FRACTURE, TIBIAL PLATEAU;  Surgeon: Marshal Manuel MD;  Location: [facility] OR [address] [place] [address];  Service: Orthopedics;  Laterality: Right;    ORIF, FRACTURE, TIBIAL TUBEROSITY Right 2025    Procedure: ORIF, FRACTURE, TIBIAL TUBEROSITY;  Surgeon: Marshal Manuel MD;  Location: [address];  Service: Orthopedics;  Laterality: Right;    REMOVAL OF EXTERNAL FIXATION DEVICE Right 2025    Procedure: REMOVAL, EXTERNAL FIXATION DEVICE;  Surgeon: Marshal Manuel MD;  Location: [facility] OR [address] [place] [address];  Service:  Orthopedics;  Laterality: Right;       Time Tracking:     PT Received On: 04/30/25  PT Start Time: 0955     PT Stop Time: 1026  PT Total Time (min): 31 min     Billable Minutes: Evaluation 8, Gait Training 15, and Therapeutic Activity 8      04/30/2025

## 2025-04-30 NOTE — PLAN OF CARE
Problem: Physical Therapy  Goal: Physical Therapy Goal  Description: Goals to be met by: 2025     Patient will increase functional independence with mobility by performin. Supine to sit with Supervision  2. Sit to supine with Supervision  3. Sit to stand transfer with Supervision with RW  4. Gait  x 100 feet with Supervision using Rolling Walker.   5. Lower extremity exercise program x10 reps per handout, with supervision    Outcome: Progressing

## 2025-04-30 NOTE — ANESTHESIA POSTPROCEDURE EVALUATION
Anesthesia Post Evaluation    Patient: Shlomo Wallace    Procedure(s) Performed: Procedure(s) (LRB):  ORIF, FRACTURE, TIBIAL PLATEAU (Right)  REMOVAL, EXTERNAL FIXATION DEVICE (Right)  ORIF, FRACTURE, TIBIAL TUBEROSITY (Right)    Final Anesthesia Type: general      Patient location during evaluation: floor  Patient participation: Yes- Able to Participate  Level of consciousness: awake and alert  Post-procedure vital signs: reviewed and stable  Pain management: adequate  Airway patency: patent    PONV status at discharge: No PONV  Anesthetic complications: no      Cardiovascular status: blood pressure returned to baseline  Respiratory status: unassisted, spontaneous ventilation and room air  Hydration status: euvolemic  Follow-up not needed.              Vitals Value Taken Time   /81 04/30/25 08:16   Temp 36.7 °C (98 °F) 04/30/25 08:16   Pulse 100 04/30/25 08:16   Resp 18 04/30/25 10:45   SpO2 100 % 04/30/25 08:16         Event Time   Out of Recovery 15:00:00         Pain/Errol Score: Pain Rating Prior to Med Admin: 9 (4/30/2025 10:45 AM)  Pain Rating Post Med Admin: 2 (4/30/2025 10:00 AM)  Errol Score: 9 (4/29/2025  3:00 PM)

## 2025-04-30 NOTE — PLAN OF CARE
04/30/25 1240   Post-Acute Status   Post-Acute Authorization Home Health;Other  (DME)   Home Health Status Referrals Sent   Discharge Plan   Discharge Plan A Home Health     SW called pt's  to obtain approval/1010 form, left voice message for Inocencio Martinez # [phone] and email [email or web address]). SW to follow.    [place]- Accepted pending workman's comp approval and [facility], pending 1010 form.    Padmini Araya LCSW  Case Management   [facility]   Ext. [phone]

## 2025-05-01 ENCOUNTER — TELEPHONE (OUTPATIENT)
Dept: ORTHOPEDICS | Facility: CLINIC | Age: 55
End: 2025-05-01
Payer: COMMERCIAL

## 2025-05-01 VITALS
SYSTOLIC BLOOD PRESSURE: 123 MMHG | DIASTOLIC BLOOD PRESSURE: 56 MMHG | HEART RATE: 114 BPM | RESPIRATION RATE: 18 BRPM | WEIGHT: 274.94 LBS | BODY MASS INDEX: 33.48 KG/M2 | TEMPERATURE: 98 F | HEIGHT: 76 IN | OXYGEN SATURATION: 97 %

## 2025-05-01 LAB
POCT GLUCOSE: 202 MG/DL (ref 70–110)
POCT GLUCOSE: 302 MG/DL (ref 70–110)

## 2025-05-01 PROCEDURE — 25000003 PHARM REV CODE 250

## 2025-05-01 PROCEDURE — 25000003 PHARM REV CODE 250: Performed by: STUDENT IN AN ORGANIZED HEALTH CARE EDUCATION/TRAINING PROGRAM

## 2025-05-01 RX ADMIN — PANTOPRAZOLE SODIUM 40 MG: 40 TABLET, DELAYED RELEASE ORAL at 08:05

## 2025-05-01 RX ADMIN — GABAPENTIN 100 MG: 100 CAPSULE ORAL at 08:05

## 2025-05-01 RX ADMIN — ASPIRIN 81 MG: 81 TABLET, COATED ORAL at 08:05

## 2025-05-01 RX ADMIN — CELECOXIB 200 MG: 200 CAPSULE ORAL at 08:05

## 2025-05-01 RX ADMIN — METHOCARBAMOL 1000 MG: 500 TABLET ORAL at 06:05

## 2025-05-01 RX ADMIN — CEFADROXIL 500 MG: 500 CAPSULE ORAL at 08:05

## 2025-05-01 RX ADMIN — ATORVASTATIN CALCIUM 10 MG: 10 TABLET, FILM COATED ORAL at 08:05

## 2025-05-01 RX ADMIN — ACETAMINOPHEN 1000 MG: 500 TABLET ORAL at 12:05

## 2025-05-01 RX ADMIN — ACETAMINOPHEN 1000 MG: 500 TABLET ORAL at 06:05

## 2025-05-01 RX ADMIN — METFORMIN HYDROCHLORIDE 500 MG: 500 TABLET ORAL at 08:05

## 2025-05-01 RX ADMIN — INSULIN ASPART 4 UNITS: 100 INJECTION, SOLUTION INTRAVENOUS; SUBCUTANEOUS at 06:05

## 2025-05-01 NOTE — PLAN OF CARE
05/01/25 1327   Final Note   Assessment Type Final Discharge Note   Anticipated Discharge Disposition Home-Health     Patient discharged Home with support from his mother and [facility] and [facility] set-up, RW delivered.     Future Appointments   Date Time Provider Department Center   5/13/2025  8:30 AM Mary Pinedo PA-C [facility] [address]   6/10/2025  8:45 AM Mary Pinedo PA-C [facility] [address] Melanie James, LCSW  Case Management   [facility]   Ext. [phone]

## 2025-05-01 NOTE — ANESTHESIA POST-OP PAIN MANAGEMENT
Acute Pain Service Progress Note    Shlomo Wallace is a 54 y.o., male, [id number].    Surgery:  R ORIF Tibial plateau fracture    Post Op Day #: 2    Catheter type: perineural  adductor    Infusion type: Ropivacaine 0.2%  10cc q3h basal and 5cc q30min demand    Problem List:    Active Hospital Problems    Diagnosis  POA    *Closed bicondylar fracture of right tibial plateau [S82.141A]  Yes    Closed fracture of right tibial tuberosity [S82.151A]  Yes      Resolved Hospital Problems   No resolved problems to display.       Subjective:     General appearance of alert, oriented, no complaints   Pain with rest: 2    Numbers   Pain with movement: 8    Numbers   Side Effects    1. Pruritis No    2. Nausea No    3. Motor Blockade No, 0=Ability to raise lower extremities off bed    4. Sedation No, 1=awake and alert    Objective:     Catheter site clean, dry, intact      Vitals   Vitals:    05/01/25 0801   BP: (!) 123/56   Pulse: (!) 114   Resp: 18   Temp: 36.6 °C (97.8 °F)        Labs    Admission on 04/29/2025   Component Date Value Ref Range Status    POCT Glucose 04/29/2025 219 (H)  70 - 110 mg/dL Final    POCT Glucose 04/29/2025 270 (H)  70 - 110 mg/dL Final    POCT Glucose 04/29/2025 317 (H)  70 - 110 mg/dL Final    POCT Glucose 04/29/2025 312 (H)  70 - 110 mg/dL Final    POCT Glucose 04/30/2025 188 (H)  70 - 110 mg/dL Final    POCT Glucose 04/30/2025 242 (H)  70 - 110 mg/dL Final    POCT Glucose 04/30/2025 221 (H)  70 - 110 mg/dL Final    POCT Glucose 05/01/2025 202 (H)  70 - 110 mg/dL Final    POCT Glucose 04/30/2025 302 (H)  70 - 110 mg/dL Final        Meds Current Medications[1]    Assessment:     Pain control adequate. Patient complained of 10/10 from 6-7AM this morning, but reported minimal pain all night and this morning when rounding. PNC dressing leaking so tegaderms applied over site. Patient resting in bed comfortably, eating and working with PT. Eager to go home today with HH.     Plan:    1) Continue R  adductor PNC at 10cc q3h IB with 5cc q30 min demand bolus. Discussed with patient and mother about PNC instructions, when and how to remove the PNC.  2) Continue multimodals with tylenol 1g q6h, robaxin 1 q8h, celecoxib 200mg daily, gabapentin 100mg TID  3) Continue prn narcotics with oxy 5mg q3h prn for mod/severe pain      Plan as stated above discussed with Dr. Aceves. Thank you for consulting the pain management team. Please reach out to the acute pain/anesthesia team with any further questions or concerns.    Sonny Patel DO  PGY3 Anesthesiology             [1]   Current Facility-Administered Medications   Medication Dose Route Frequency Provider Last Rate Last Admin    acetaminophen tablet 1,000 mg  1,000 mg Oral Q6H Lucila Martin MD   1,000 mg at 05/01/25 0624    aspirin EC tablet 81 mg  81 mg Oral BID Lucila Martin MD   81 mg at 05/01/25 0823    atorvastatin tablet 10 mg  10 mg Oral Daily Lucila Martin MD   10 mg at 05/01/25 0824    cefadroxil capsule 500 mg  500 mg Oral Q12H Lucila Martin MD   500 mg at 05/01/25 0823    celecoxib capsule 200 mg  200 mg Oral Daily Lucila Martin MD   200 mg at 05/01/25 0824    dextrose 50% injection 12.5 g  12.5 g Intravenous PRN Lucila Martin MD        dextrose 50% injection 25 g  25 g Intravenous PRN Lucila Martin MD        gabapentin capsule 100 mg  100 mg Oral TID Lucila Martin MD   100 mg at 05/01/25 0824    glucagon (human recombinant) injection 1 mg  1 mg Intramuscular PRN Lucila Martin MD        glucose chewable tablet 16 g  16 g Oral PRN Lucila Martin MD        glucose chewable tablet 24 g  24 g Oral PRN Lucila Martin MD        insulin aspart U-100 pen 0-10 Units  0-10 Units Subcutaneous QID (AC + HS) PRN Lucila Martin MD   4 Units at 05/01/25 0623    LIDOcaine (PF) 10 mg/ml (1%) injection 10 mg  1 mL Intradermal Once PRN Lucila Martin MD        melatonin tablet 6 mg  6 mg Oral Nightly PRN Lucila Martin  MD MERCED        metFORMIN tablet 500 mg  500 mg Oral Daily with breakfast Lucila Martin MD   500 mg at 05/01/25 0824    methocarbamoL tablet 1,000 mg  1,000 mg Oral Q8H Micheal Langford MD   1,000 mg at 05/01/25 0624    ondansetron disintegrating tablet 8 mg  8 mg Oral Q8H PRN Lucila Martin MD        oxyCODONE immediate release tablet 5 mg  5 mg Oral Q3H PRN Micheal Langford MD   5 mg at 04/30/25 1045    pantoprazole EC tablet 40 mg  40 mg Oral Daily Lucila Martin MD   40 mg at 05/01/25 0824    polyethylene glycol packet 17 g  17 g Oral Daily Micheal Langford MD        ropivacaine 0.2% Perineural Pump infusion 500 ML   Perineural Continuous Micheal Langford MD   New Bag at 04/29/25 1452    senna-docusate 8.6-50 mg per tablet 1 tablet  1 tablet Oral Daily Micheal Langford MD   1 tablet at 04/29/25 1532    sodium chloride 0.9% flush 10 mL  10 mL Intravenous PRN Lucila Martin MD

## 2025-05-01 NOTE — ASSESSMENT & PLAN NOTE
54M fall 3 ft from truck 4/17/25 with bicondylar right tibial plateau fracture    Surgery history:  -ex fix 4/17/25  -ex fix removal and ORIF 4/29/25    Pain control much improved this morning.    Plan:  Multimodal pain regimen, PNC  NWB RLE, ROM 0-90 at knee   Elevate RLE and ice right proximal tibia while at rest  Dvt ppx w SCDs, ASA 81mg bid   Prevena restore right knee (14d life)  Change pin site dressings prn    DC today  FU 2 wks post op

## 2025-05-01 NOTE — PROGRESS NOTES
[facility] - [facility]  Orthopedics  Progress Note    Attg Note:  I agree with the resident's assessment and plan.  Feeling great today.  DC home.  Nonweightbearing right lower extremity.    Marshal Manuel MD      Patient Name: Shlomo Wallace  MRN: [id number]  Admission Date: 4/29/2025  Hospital Length of Stay: 1 days  Attending Provider: Marshal Manuel MD  Primary Care Provider: Cindy Castillo MD  Follow-up For: Procedure(s) (LRB):  ORIF, FRACTURE, TIBIAL PLATEAU (Right)  REMOVAL, EXTERNAL FIXATION DEVICE (Right)  ORIF, FRACTURE, TIBIAL TUBEROSITY (Right)    Post-Operative Day: 2 Days Post-Op  Subjective:     Principal Problem:Closed bicondylar fracture of right tibial plateau    Principal Orthopedic Problem: same sp ORIF 4/29/25    Interval History: No acute events overnight. Pain better controlled overall. Patient reports sharp episode of pain from 6 - 7 AM, but was feeling better during my rounds. Ambulated 12 feet with therapy yesterday. Eager to discharge home this morning.    Review of patient's allergies indicates:  No Known Allergies    Current Facility-Administered Medications   Medication    acetaminophen tablet 1,000 mg    aspirin EC tablet 81 mg    atorvastatin tablet 10 mg    cefadroxil capsule 500 mg    celecoxib capsule 200 mg    dextrose 50% injection 12.5 g    dextrose 50% injection 25 g    gabapentin capsule 100 mg    glucagon (human recombinant) injection 1 mg    glucose chewable tablet 16 g    glucose chewable tablet 24 g    insulin aspart U-100 pen 0-10 Units    LIDOcaine (PF) 10 mg/ml (1%) injection 10 mg    melatonin tablet 6 mg    metFORMIN tablet 500 mg    methocarbamoL tablet 1,000 mg    ondansetron disintegrating tablet 8 mg    oxyCODONE immediate release tablet 5 mg    pantoprazole EC tablet 40 mg    polyethylene glycol packet 17 g    ropivacaine 0.2% Perineural Pump infusion 500 ML    senna-docusate 8.6-50 mg per tablet 1 tablet    sodium chloride 0.9% flush 10 mL     Objective:  "    Vital Signs (Most Recent):  Temp: 97.8 °F (36.6 °C) (05/01/25 0801)  Pulse: (!) 114 (05/01/25 0801)  Resp: 18 (05/01/25 0801)  BP: (!) 123/56 (05/01/25 0801)  SpO2: 97 % (05/01/25 0801) Vital Signs (24h Range):  Temp:  [97.2 °F (36.2 °C)-99.5 °F (37.5 °C)] 97.8 °F (36.6 °C)  Pulse:  [107-119] 114  Resp:  [17-18] 18  SpO2:  [97 %-99 %] 97 %  BP: (109-130)/(56-79) 123/56     Weight: 124.7 kg (274 lb 14.6 oz)  Height: 6' 4" (193 cm)  Body mass index is 33.46 kg/m².    No intake or output data in the 24 hours ending 05/01/25 0916    Alert, oriented, NAD  Respirations unlabored        Ortho/SPM Exam  RLE exam:    Elevated with ice over the right proximal tibia  Prevena restore in place with good seal, no output  Pin sites dressed   Compartments soft and compressible  No pain with passive ROM ankle/toes  Active PF/DF intact ankle, toes  SILT  DP 2+     Significant Labs: None    Significant Imaging: I have reviewed and interpreted all pertinent imaging results/findings.  Assessment/Plan:     * Closed bicondylar fracture of right tibial plateau  54M fall 3 ft from truck 4/17/25 with bicondylar right tibial plateau fracture    Surgery history:  -ex fix 4/17/25  -ex fix removal and ORIF 4/29/25    Pain control much improved this morning.    Plan:  Multimodal pain regimen, PNC  NWB RLE, ROM 0-90 at knee   Elevate RLE and ice right proximal tibia while at rest  Dvt ppx w SCDs, ASA 81mg bid   Prevena restore right knee (14d life)  Change pin site dressings prn    DC today  FU 2 wks post op          Roque Arriola MD  Orthopedics  [facility] - Surgery    "

## 2025-05-01 NOTE — SUBJECTIVE & OBJECTIVE
"Principal Problem:Closed bicondylar fracture of right tibial plateau    Principal Orthopedic Problem: same sp ORIF 4/29/25    Interval History: No acute events overnight. Pain better controlled overall. Patient reports sharp episode of pain from 6 - 7 AM, but was feeling better during my rounds. Ambulated 12 feet with therapy yesterday. Eager to discharge home this morning.    Review of patient's allergies indicates:  No Known Allergies    Current Facility-Administered Medications   Medication    acetaminophen tablet 1,000 mg    aspirin EC tablet 81 mg    atorvastatin tablet 10 mg    cefadroxil capsule 500 mg    celecoxib capsule 200 mg    dextrose 50% injection 12.5 g    dextrose 50% injection 25 g    gabapentin capsule 100 mg    glucagon (human recombinant) injection 1 mg    glucose chewable tablet 16 g    glucose chewable tablet 24 g    insulin aspart U-100 pen 0-10 Units    LIDOcaine (PF) 10 mg/ml (1%) injection 10 mg    melatonin tablet 6 mg    metFORMIN tablet 500 mg    methocarbamoL tablet 1,000 mg    ondansetron disintegrating tablet 8 mg    oxyCODONE immediate release tablet 5 mg    pantoprazole EC tablet 40 mg    polyethylene glycol packet 17 g    ropivacaine 0.2% Perineural Pump infusion 500 ML    senna-docusate 8.6-50 mg per tablet 1 tablet    sodium chloride 0.9% flush 10 mL     Objective:     Vital Signs (Most Recent):  Temp: 97.8 °F (36.6 °C) (05/01/25 0801)  Pulse: (!) 114 (05/01/25 0801)  Resp: 18 (05/01/25 0801)  BP: (!) 123/56 (05/01/25 0801)  SpO2: 97 % (05/01/25 0801) Vital Signs (24h Range):  Temp:  [97.2 °F (36.2 °C)-99.5 °F (37.5 °C)] 97.8 °F (36.6 °C)  Pulse:  [107-119] 114  Resp:  [17-18] 18  SpO2:  [97 %-99 %] 97 %  BP: (109-130)/(56-79) 123/56     Weight: 124.7 kg (274 lb 14.6 oz)  Height: 6' 4" (193 cm)  Body mass index is 33.46 kg/m².    No intake or output data in the 24 hours ending 05/01/25 0916    Alert, oriented, NAD  Respirations unlabored        Ortho/SPM Exam  RLE " exam:    Elevated with ice over the right proximal tibia  Prevena restore in place with good seal, no output  Pin sites dressed   Compartments soft and compressible  No pain with passive ROM ankle/toes  Active PF/DF intact ankle, toes  SILT  DP 2+     Significant Labs: None    Significant Imaging: I have reviewed and interpreted all pertinent imaging results/findings.

## 2025-05-01 NOTE — ADDENDUM NOTE
Addendum  created 05/01/25 1208 by Milan Aceves MD    Charge Capture section accepted, Cosign clinical note with attestation

## 2025-05-01 NOTE — PLAN OF CARE
05/01/25 0740   Post-Acute Status   Post-Acute Authorization Home Health   Home Health Status Pending Payor Review   Discharge Plan   Discharge Plan A Home with family;Home Health     SW reached out to pt's workman's comp  via email to follow up on approval for HH/DME, Inocencio Martinez # [phone] and email [email or web address]).  SW to follow.    [facility] and [facility] set-up, RW delivered.      Padmini Araya LCSW  Case Management   [facility]   Ext. [phone]

## 2025-05-01 NOTE — PLAN OF CARE
Problem: Adult Inpatient Plan of Care  Goal: Patient-Specific Goal (Individualized)  Outcome: Progressing  Goal: Absence of Hospital-Acquired Illness or Injury  Outcome: Progressing  Goal: Readiness for Transition of Care  Outcome: Progressing     Problem: Wound  Goal: Optimal Coping  Outcome: Progressing

## 2025-05-01 NOTE — PLAN OF CARE
Problem: Adult Inpatient Plan of Care  Goal: Plan of Care Review  Outcome: Progressing  Goal: Patient-Specific Goal (Individualized)  Outcome: Progressing  Goal: Absence of Hospital-Acquired Illness or Injury  Outcome: Progressing  Goal: Optimal Comfort and Wellbeing  Outcome: Progressing  Goal: Readiness for Transition of Care  Outcome: Progressing     Problem: Wound  Goal: Optimal Coping  Outcome: Progressing  Goal: Optimal Functional Ability  Outcome: Progressing  Goal: Absence of Infection Signs and Symptoms  Outcome: Progressing  Goal: Improved Oral Intake  Outcome: Progressing  Goal: Optimal Pain Control and Function  Outcome: Progressing  Goal: Skin Health and Integrity  Outcome: Progressing  Goal: Optimal Wound Healing  Outcome: Progressing     Problem: Skin Injury Risk Increased  Goal: Skin Health and Integrity  Outcome: Progressing    Pt currently resting in bed with blankets propped up on affected extremity with blankets, pillows and ice packs. Pain was described as tolerable as no PRN medications asked for. No distress at this time as bed is low, wheels locked in place, side rail up x2 with call light in reach for assistance. Continue with plan of care.

## 2025-05-02 NOTE — DISCHARGE SUMMARY
[facility] - [facility]  Orthopedics  Discharge Summary      Patient Name: Shlomo Wallace  MRN: [id number]  Admission Date: 4/29/2025  Hospital Length of Stay: 1 days  Discharge Date and Time: 5/1/2025 12:30 PM  Attending Physician: Shi att. providers found   Discharging Provider: Roque Arriola MD  Primary Care Provider: Cindy Castillo MD    HPI: 54 year old male, fall 3 feet out of a truck while at work on 04/19/25. Sustained a right bicondylar tibial plateau fracture .     04/17/25: Dr. Manuel:   Spanning external fixation right bicondylar tibial plateau fracture - 20690  Closed treatment of right bicondylar tibial plateau fracture with manipulation under anesthesia.    Procedure(s) (LRB):  ORIF, FRACTURE, TIBIAL PLATEAU (Right)  REMOVAL, EXTERNAL FIXATION DEVICE (Right)  ORIF, FRACTURE, TIBIAL TUBEROSITY (Right)      Hospital Course: On 4/29/25, the patient arrived to the [facility] for pre-operative management.  Upon completion of the pre-operative preparation, the patient was taken back to the operative theatre. The above procedure was performed without complication and the patient was transported to the post anesthesia care unit in stable condition.  After appropriate recovery from the anaesthetic agents used during the surgery, the patient was then transported to the inpatient floor.  The interim of the hospital stay from arrival on the floor up to discharge has been uncomplicated. The patient has tolerated regular diet.  The patient's pain has been controlled using a multimodal approach. Currently, the patient's pain is well controlled on an oral regimen.  The patient has been voiding without difficulty.  The patient began participation in physical therapy after surgery and has progressed throughout the entire hospital stay.  Currently, the patient's progress is sufficient to allow the them to be discharged to home with home health safely.  The patient agrees with this assessment and  "desires a discharge today.    Significant Diagnostic Studies: No pertinent studies.    Pending Diagnostic Studies:       None          Final Active Diagnoses:    Diagnosis Date Noted POA    PRINCIPAL PROBLEM:  Closed bicondylar fracture of right tibial plateau [S82.141A] 04/17/2025 Yes    Closed fracture of right tibial tuberosity [S82.151A] 04/29/2025 Yes      Problems Resolved During this Admission:      Discharged Condition: good    Disposition: Home-Health Care [facility] [place]    Follow Up:  In Ortho Trauma Clinic in 2 weeks     Contact information for after-discharge care       Home Medical Care       [facility] .    Service: Home Health Services  Contact information:  [address]  [facility] [id number]  [phone]                                 Patient Instructions:   Keep wound VAC in place with the appropriate power and suction.  Change ex fix pin site dressings as needed.       WALKER FOR HOME USE     Order Specific Question Answer Comments   Type of Walker: Adult (5'4"-6'6")    With wheels? Yes    Height: 6' 4" (1.93 m)    Weight: 124.7 kg (274 lb 14.6 oz)    Length of need (1-99 months): 99    Does patient have medical equipment at home? wheelchair    Does patient have medical equipment at home? walker, standard    Please check all that apply: Patient needs help to get in and out of chair.    Please check all that apply: Walker will be used for gait training.    Please check all that apply: Patient is unable to safely ambulate without equipment.    Please check all that apply: Patient's condition impairs ambulation.      COMMODE FOR HOME USE     Order Specific Question Answer Comments   Type: Standard    Height: 6' 4" (1.93 m)    Weight: 124.7 kg (274 lb 14.6 oz)    Does patient have medical equipment at home? wheelchair    Does patient have medical equipment at home? walker, standard    Length of need (1-99 months): 99      Diet general     Call MD for:  temperature " >100.4     Call MD for:  persistent nausea and vomiting     Call MD for:  severe uncontrolled pain     Call MD for:  difficulty breathing, headache or visual disturbances     Call MD for:  redness, tenderness, or signs of infection (pain, swelling, redness, odor or green/yellow discharge around incision site)     Call MD for:  hives     Call MD for:  persistent dizziness or light-headedness     Call MD for:  extreme fatigue     Keep surgical extremity elevated     Ice to affected area   Order Comments: using barrier between ice and skin     Leave dressing on - Keep it clean, dry, and intact until clinic visit     Activity as tolerated     Non weight bearing   Order Comments: Non weight bearing right lower extremity. Range of motion 0-90 at the knee     Medications:  Reconciled Home Medications:      Medication List        START taking these medications      cefadroxil 500 MG Cap  Commonly known as: DURICEF  Take 1 capsule (500 mg total) by mouth every 12 (twelve) hours. for 7 days     oxyCODONE 5 MG immediate release tablet  Commonly known as: ROXICODONE  Take 1-2 tablets (5-10 mg total) by mouth every 4 (four) hours as needed for Pain.            CHANGE how you take these medications      acetaminophen 500 MG tablet  Commonly known as: TYLENOL  Take 2 tablets (1,000 mg total) by mouth every 8 (eight) hours. for 14 days  What changed:   when to take this  reasons to take this     methocarbamoL 750 MG Tab  Commonly known as: ROBAXIN  Take 1 tablet (750 mg total) by mouth every 8 (eight) hours. for 14 days  What changed:   medication strength  how much to take  when to take this            CONTINUE taking these medications      aspirin 81 MG EC tablet  Commonly known as: ECOTRIN  Take 1 tablet (81 mg total) by mouth 2 (two) times a day.     atorvastatin 10 MG tablet  Commonly known as: LIPITOR  Take 1 tablet (10 mg total) by mouth once daily.     blood sugar diagnostic Strp  To check BG 3 times daily, to use with  "insurance preferred meter     blood-glucose meter kit  To check BG 3 times daily with meals and dinner, to use with insurance preferred meter     celecoxib 200 MG capsule  Commonly known as: CeleBREX  Take 1 capsule (200 mg total) by mouth once daily. for 14 days     DEXCOM G6 SENSOR Sonya  Generic drug: blood-glucose sensor  Use to check blood sugars twice daily     DEXCOM G6 TRANSMITTER Sonya  Generic drug: blood-glucose transmitter  Use to check blood sugar twice daily     gabapentin 100 MG capsule  Commonly known as: NEURONTIN  Take 1 capsule (100 mg total) by mouth 3 (three) times daily.     lancets Misc  To check BG 3 times daily with meals and dinner, to use with insurance preferred meter     LEVEMIR FLEXTOUCH U100 INSULIN 100 unit/mL (3 mL) Inpn pen  Generic drug: insulin detemir U-100 (Levemir)  Inject 15 Units into the skin every evening.     metFORMIN 500 MG tablet  Commonly known as: GLUCOPHAGE  Take 1 tablet (500 mg total) by mouth daily with breakfast.     ondansetron 4 MG tablet  Commonly known as: ZOFRAN  Take 2 tablets (8 mg total) by mouth every 6 (six) hours as needed for Nausea.     pantoprazole 40 MG tablet  Commonly known as: PROTONIX  Take 1 tablet (40 mg total) by mouth once daily.     pen needle, diabetic 32 gauge x 5/32" Ndle  Use to give insulin daily     polyethylene glycol 17 gram/dose powder  Commonly known as: GLYCOLAX  Use cap to measure 17g, mix with liquid, and drink by mouth daily as needed for Constipation.            STOP taking these medications      HYDROcodone-acetaminophen 5-325 mg per tablet  Commonly known as: MARLEY Arriola MD  Orthopedics  [facility] - Surgery  "

## 2025-05-03 NOTE — CARE UPDATE
I called Mr. Wallace this morning on POD #4. He was able to remove the PNC without any complications. He reports the blue tip was intact at the catheter end and there was no redness or swelling at the insertion site. He will be mailing the pump back on Monday.    Barber Stern MD  [facility] PGY-2

## 2025-05-12 ENCOUNTER — TELEPHONE (OUTPATIENT)
Dept: ORTHOPEDICS | Facility: CLINIC | Age: 55
End: 2025-05-12
Payer: COMMERCIAL

## 2025-05-12 NOTE — TELEPHONE ENCOUNTER
Confirm appointment for 05/13/25  Spoke with pt.. Pt was informed to wear comfortable clothing with no button, zip, pins, or metal. For a right tibia xray. Patient states verbal understanding and has no further questions.

## 2025-05-13 ENCOUNTER — OFFICE VISIT (OUTPATIENT)
Dept: ORTHOPEDICS | Facility: CLINIC | Age: 55
End: 2025-05-13
Payer: COMMERCIAL

## 2025-05-13 ENCOUNTER — HOSPITAL ENCOUNTER (OUTPATIENT)
Dept: RADIOLOGY | Facility: HOSPITAL | Age: 55
Discharge: HOME | End: 2025-05-13
Attending: PHYSICIAN ASSISTANT
Payer: COMMERCIAL

## 2025-05-13 DIAGNOSIS — S82.141A CLOSED BICONDYLAR FRACTURE OF RIGHT TIBIAL PLATEAU: ICD-10-CM

## 2025-05-13 DIAGNOSIS — S82.141A CLOSED BICONDYLAR FRACTURE OF RIGHT TIBIAL PLATEAU: Primary | ICD-10-CM

## 2025-05-13 PROCEDURE — 3051F HG A1C>EQUAL 7.0%<8.0%: CPT | Mod: CPTII,S$GLB,, | Performed by: PHYSICIAN ASSISTANT

## 2025-05-13 PROCEDURE — 1159F MED LIST DOCD IN RCRD: CPT | Mod: CPTII,S$GLB,, | Performed by: PHYSICIAN ASSISTANT

## 2025-05-13 PROCEDURE — 99999 PR PBB SHADOW E&M-EST. PATIENT-LVL III: CPT | Mod: PBBFAC,,, | Performed by: PHYSICIAN ASSISTANT

## 2025-05-13 PROCEDURE — 73560 X-RAY EXAM OF KNEE 1 OR 2: CPT | Mod: 26,RT,, | Performed by: RADIOLOGY

## 2025-05-13 PROCEDURE — 99024 POSTOP FOLLOW-UP VISIT: CPT | Mod: S$GLB,,, | Performed by: PHYSICIAN ASSISTANT

## 2025-05-13 PROCEDURE — 73560 X-RAY EXAM OF KNEE 1 OR 2: CPT | Mod: TC,RT

## 2025-05-13 RX ORDER — METHOCARBAMOL 750 MG/1
750 TABLET, FILM COATED ORAL EVERY 8 HOURS
Qty: 42 TABLET | Refills: 0 | Status: SHIPPED | OUTPATIENT
Start: 2025-05-13 | End: 2025-05-27

## 2025-05-13 RX ORDER — CELECOXIB 200 MG/1
200 CAPSULE ORAL DAILY
Qty: 14 CAPSULE | Refills: 0 | Status: SHIPPED | OUTPATIENT
Start: 2025-05-13 | End: 2025-05-27

## 2025-05-13 RX ORDER — HYDROCODONE BITARTRATE AND ACETAMINOPHEN 10; 325 MG/1; MG/1
1 TABLET ORAL EVERY 4 HOURS PRN
Qty: 42 TABLET | Refills: 0 | Status: SHIPPED | OUTPATIENT
Start: 2025-05-13

## 2025-05-13 NOTE — PROGRESS NOTES
"Principal Orthopedic Problem: right bicondylar tibial plateau fracture      Relevant Medical History: according to chart    PMHX: DM    Ambulates without assistance@ baseline   Tobacco Use: Lifetime nonsmoker  Denies BOYD   Lives with his mother in [place], [place]. No stairs to enter the home.    Anticoagulation:  None at baseline  Immunosuppressive medications:  None at baseline  Occupation:  He works for [employer]  No hx of MI, CVA, DVT/PE  No hx of cancer, chemotherapy, or radiation        Lab Results   Component Value Date    HGBA1C 7.6 (H) 04/17/2025     Estimated body mass index is 33.46 kg/m² as calculated from the following:    Height as of 5/1/25: 6' 4" (1.93 m).    Weight as of 5/1/25: 124.7 kg (274 lb 14.6 oz).      HPI:   54 year old male, fall 3 feet out of a truck while at work on 04/19/25  RADS: Right bicondylar tibial plateau fracture     04/17/25: :   Spanning external fixation right bicondylar tibial plateau fracture - 20690  Closed treatment of right bicondylar tibial plateau fracture with manipulation under anesthesia     Mr. Wallace is here today for a post-operative visit    Interval History:  he reports that he is doing ok.   he is at home . he is  participating in PT/OT. Home health   Pain is controlled somewhat.  he is  taking pain medication.  Reports oxycodone is not helping/ lasting. He also taking Robaxin, tylenol and Celebrex.   Wound vac removed yesterday by home health,. Had come off.   Reports one fall since last visit.   he denies fever, chills, and sweats .     Physical exam:    Patient arrives to exam room: wheelchair.  Patient is  accompanied    Incision sites is clean, dry and intact.    Healing well no signs of breakdown or infection. Every other staple removed the rest left in place.     RADS: All pertinent images were reviewed by myself:   Postoperative changes are again identified relating to prior internal fixation of the comminuted proximal tibial fracture " initially documented on 04/17/2025, plate/screw constructs in place as before. Note is also again made of a fracture involving the proximal aspect of the right fibula. No unusual postoperative findings or detrimental interval change since 04/29/2025 is appreciated.     Assessment:  Post-op visit ( 2 weeks)    Plan:  Current care, treatment plan, precautions, activity level/ modifications, limitations, rehabilitation exercises and proposed future treatment were discussed with the patient. We discussed the need to monitor for changes in symptoms and condition and report them to the physician.  Discussed importance of compliance with all appointments and follow up examinations.     WOUND CARE :  - keep area covered.   -P monitor wound closely and multiple times daily for any problems. Call clinic immediately or report to ED for immediate medical attention for any complications including reopening of wound, drainage, purulence, redness, streaking, odor, pain out of proportion, fever, chills, etc.       ACTIVITY:   - light  -range of motion 0-90 x 4 weeks then ok ROMAT   - NWB 8-10 weeks pending healing     -PT/OT, home health , Patient is responsible to establish and continue care      PAIN MEDICATION:   - Multimodal pain control  - Pain medication: refill was needed, d/c oxycodone and tylenol and changed to Norco   - Pain medication refill policy provided to patient for review, yes.    - Patient was informed a multi-modal approach is used to treat their pain. With the goal to get off of narcotic pain medication and discontinue as soon as possible.   - ice and elevation to reduce pain and swelling     DVT PROPHYLAXIS:   - ASA 81 mg bid    FOLLOW UP:   - Patient will follow up in the clinic in 1 weeks, possible removal of remaining staples.   -X-rays of the right knee at all follow up visits

## 2025-05-26 ENCOUNTER — HOSPITAL ENCOUNTER (OUTPATIENT)
Dept: RADIOLOGY | Facility: HOSPITAL | Age: 55
Discharge: HOME | End: 2025-05-26
Attending: PHYSICIAN ASSISTANT
Payer: COMMERCIAL

## 2025-05-26 ENCOUNTER — OFFICE VISIT (OUTPATIENT)
Dept: ORTHOPEDICS | Facility: CLINIC | Age: 55
End: 2025-05-26
Payer: COMMERCIAL

## 2025-05-26 VITALS — WEIGHT: 274.94 LBS | HEIGHT: 76 IN | BODY MASS INDEX: 33.48 KG/M2

## 2025-05-26 DIAGNOSIS — S82.141A CLOSED BICONDYLAR FRACTURE OF RIGHT TIBIAL PLATEAU: Primary | ICD-10-CM

## 2025-05-26 DIAGNOSIS — S82.141A CLOSED BICONDYLAR FRACTURE OF RIGHT TIBIAL PLATEAU: ICD-10-CM

## 2025-05-26 PROCEDURE — 99999 PR PBB SHADOW E&M-EST. PATIENT-LVL III: CPT | Mod: PBBFAC,,, | Performed by: PHYSICIAN ASSISTANT

## 2025-05-26 PROCEDURE — 1159F MED LIST DOCD IN RCRD: CPT | Mod: CPTII,S$GLB,, | Performed by: PHYSICIAN ASSISTANT

## 2025-05-26 PROCEDURE — 3051F HG A1C>EQUAL 7.0%<8.0%: CPT | Mod: CPTII,S$GLB,, | Performed by: PHYSICIAN ASSISTANT

## 2025-05-26 PROCEDURE — 99024 POSTOP FOLLOW-UP VISIT: CPT | Mod: S$GLB,,, | Performed by: PHYSICIAN ASSISTANT

## 2025-05-26 NOTE — PROGRESS NOTES
"Principal Orthopedic Problem: right bicondylar tibial plateau fracture      Relevant Medical History: according to chart    PMHX: DM    Ambulates without assistance@ baseline   Tobacco Use: Lifetime nonsmoker  Denies BOYD   Lives with his mother in [place], [place]. No stairs to enter the home.    Anticoagulation:  None at baseline  Immunosuppressive medications:  None at baseline  Occupation:  He works for [employer]  No hx of MI, CVA, DVT/PE  No hx of cancer, chemotherapy, or radiation        Lab Results   Component Value Date    HGBA1C 7.6 (H) 04/17/2025     Estimated body mass index is 33.46 kg/m² as calculated from the following:    Height as of 5/1/25: 6' 4" (1.93 m).    Weight as of 5/1/25: 124.7 kg (274 lb 14.6 oz).      HPI:   54 year old male, fall 3 feet out of a truck while at work on 04/19/25  RADS: Right bicondylar tibial plateau fracture     04/17/25: :   Spanning external fixation right bicondylar tibial plateau fracture - 20690  Closed treatment of right bicondylar tibial plateau fracture with manipulation under anesthesia     Mr. Wallace is here today for a post-operative visit    Interval History:  he reports that he is doing ok.   he is at home . he is  participating in PT/OT. Home health   Pain is controlled somewhat.  he is  taking pain medication.  Taking Robaxin, Norco and Celebrex. Getting muscle spasms.   he denies fever, chills, and sweats .     Physical exam:    Patient arrives to exam room: wheelchair.  Patient is  accompanied    Incision sites is clean, dry and intact.    Healing well no signs of breakdown or infection. Remaining staples removed.      RADS: All pertinent images were reviewed by myself:   None done today     Assessment:  Post-op visit ( 3 weeks)    Plan:  Current care, treatment plan, precautions, activity level/ modifications, limitations, rehabilitation exercises and proposed future treatment were discussed with the patient. We discussed the need to monitor for " changes in symptoms and condition and report them to the physician.  Discussed importance of compliance with all appointments and follow up examinations.     WOUND CARE :  - The patient was advised to keep the incision clean and dry for the next 24 hours after which he may wash the area with antibacterial soap in the shower. Will not submerge until the incision is completely healed  -Patient was advised to monitor wound closely and multiple times daily for any problems. Call clinic immediately or report to ED for immediate medical attention for any complications including reopening of wound, drainage, purulence, redness, streaking, odor, pain out of proportion, fever, chills, etc.   -- If there are signs of infection, please call Ortho Clinic [phone] for further instructions and to make appt to be seen.         ACTIVITY:   - light  -range of motion 0-90 x 4 weeks then ok ROMAT   - NWB 8-10 weeks pending healing     -PT/OT, home health , Patient is responsible to establish and continue care      PAIN MEDICATION:   - Multimodal pain control  - Pain medication: refill was not needed,    - Pain medication refill policy provided to patient for review, yes.    - Patient was informed a multi-modal approach is used to treat their pain. With the goal to get off of narcotic pain medication and discontinue as soon as possible.   - ice and elevation to reduce pain and swelling     DVT PROPHYLAXIS:   - ASA 81 mg bid    FOLLOW UP:   - Patient will follow up in the clinic 6 weeks post op.   -X-rays of the right knee needed  - consider when to advance weightbearing.

## 2025-05-27 ENCOUNTER — TELEPHONE (OUTPATIENT)
Dept: ORTHOPEDICS | Facility: CLINIC | Age: 55
End: 2025-05-27
Payer: COMMERCIAL

## 2025-05-27 NOTE — TELEPHONE ENCOUNTER
----- Message from Martha sent at 5/27/2025  7:55 AM CDT -----  Name of Caller:Arias basurto/ [employer]  Nature of Call: requesting phys therapy orders Best Call Back Number: [phone] Additional Information:Arias basurto/ [employer] calling regarding Orders for a verbal extinction for phys therapy. Please call to assist and if he does not answer, please leave a detailed message to inform

## 2025-05-28 ENCOUNTER — PATIENT MESSAGE (OUTPATIENT)
Dept: ORTHOPEDICS | Facility: CLINIC | Age: 55
End: 2025-05-28
Payer: COMMERCIAL

## 2025-05-28 DIAGNOSIS — S82.141A CLOSED BICONDYLAR FRACTURE OF RIGHT TIBIAL PLATEAU: ICD-10-CM

## 2025-05-28 RX ORDER — HYDROCODONE BITARTRATE AND ACETAMINOPHEN 10; 325 MG/1; MG/1
1 TABLET ORAL EVERY 6 HOURS PRN
Qty: 28 TABLET | Refills: 0 | Status: SHIPPED | OUTPATIENT
Start: 2025-05-28 | End: 2025-06-04

## 2025-06-09 ENCOUNTER — TELEPHONE (OUTPATIENT)
Dept: ORTHOPEDICS | Facility: CLINIC | Age: 55
End: 2025-06-09
Payer: COMMERCIAL

## 2025-06-09 NOTE — TELEPHONE ENCOUNTER
Confirm appointment for 6/10/25.  Spoke with pt.. Pt was informed to wear comfortable clothing with no button, zip, pins, or metal. For a LEFT TIBIA xray. Patient states verbal understanding and has no further questions.

## 2025-06-10 ENCOUNTER — HOSPITAL ENCOUNTER (OUTPATIENT)
Dept: RADIOLOGY | Facility: HOSPITAL | Age: 55
Discharge: HOME | End: 2025-06-10
Attending: PHYSICIAN ASSISTANT
Payer: COMMERCIAL

## 2025-06-10 ENCOUNTER — OFFICE VISIT (OUTPATIENT)
Dept: ORTHOPEDICS | Facility: CLINIC | Age: 55
End: 2025-06-10
Payer: OTHER MISCELLANEOUS

## 2025-06-10 VITALS — WEIGHT: 274.94 LBS | BODY MASS INDEX: 33.48 KG/M2 | HEIGHT: 76 IN

## 2025-06-10 DIAGNOSIS — S82.141A CLOSED BICONDYLAR FRACTURE OF RIGHT TIBIAL PLATEAU: Primary | ICD-10-CM

## 2025-06-10 DIAGNOSIS — S82.141A CLOSED BICONDYLAR FRACTURE OF RIGHT TIBIAL PLATEAU: ICD-10-CM

## 2025-06-10 PROCEDURE — 73590 X-RAY EXAM OF LOWER LEG: CPT | Mod: TC,RT

## 2025-06-10 PROCEDURE — 99999 PR PBB SHADOW E&M-EST. PATIENT-LVL IV: CPT | Mod: PBBFAC,,, | Performed by: PHYSICIAN ASSISTANT

## 2025-06-10 PROCEDURE — 99024 POSTOP FOLLOW-UP VISIT: CPT | Mod: S$GLB,,, | Performed by: PHYSICIAN ASSISTANT

## 2025-06-10 PROCEDURE — 73590 X-RAY EXAM OF LOWER LEG: CPT | Mod: 26,RT,, | Performed by: RADIOLOGY

## 2025-06-10 RX ORDER — SULFAMETHOXAZOLE AND TRIMETHOPRIM 800; 160 MG/1; MG/1
1 TABLET ORAL 2 TIMES DAILY
Qty: 20 TABLET | Refills: 0 | Status: SHIPPED | OUTPATIENT
Start: 2025-06-10 | End: 2025-06-20

## 2025-06-10 NOTE — PROGRESS NOTES
"Principal Orthopedic Problem: right bicondylar tibial plateau fracture      Relevant Medical History: according to chart    PMHX: DM    Ambulates without assistance@ baseline   Tobacco Use: Lifetime nonsmoker  Denies BOYD   Lives with his mother in [place], [place]. No stairs to enter the home.    Anticoagulation:  None at baseline  Immunosuppressive medications:  None at baseline  Occupation:  He works for [employer]  No hx of MI, CVA, DVT/PE  No hx of cancer, chemotherapy, or radiation        Lab Results   Component Value Date    HGBA1C 7.6 (H) 04/17/2025     Estimated body mass index is 33.46 kg/m² as calculated from the following:    Height as of this encounter: 6' 4" (1.93 m).    Weight as of this encounter: 124.7 kg (274 lb 14.6 oz).      HPI:   54 year old male, fall 3 feet out of a truck while at work on 04/19/25  RADS: Right bicondylar tibial plateau fracture     04/17/25: :   Spanning external fixation right bicondylar tibial plateau fracture - 20690  Closed treatment of right bicondylar tibial plateau fracture with manipulation under anesthesia     Mr. Wallace is here today for a post-operative visit    Interval History:  he reports that he is doing ok.   he is at home . he is  participating in PT/OT. Home health   Pain is controlled somewhat.  he is  taking pain medication.  Taking gabapentin, aspirin, norco prn.   he denies fever, chills, and sweats .     Physical exam:    Patient arrives to exam room: wheelchair.  Patient is  accompanied    Range of motion 0-100                     RADS: All pertinent images were reviewed by myself:   internal fixation of the comminuted proximal tibial fracture initially documented on 04/17/2025, plate/screw constructs in place as before. Note is also again made of a fracture involving the proximal aspect of the right fibula.      Assessment:  Post-op visit ( 7 weeks)    Plan:  Current care, treatment plan, precautions, activity level/ modifications, limitations, " rehabilitation exercises and proposed future treatment were discussed with the patient. We discussed the need to monitor for changes in symptoms and condition and report them to the physician.  Discussed importance of compliance with all appointments and follow up examinations.     WOUND CARE :  - he may wash the area with antibacterial soap in the shower. Will not submerge until the incision is completely healed  -Patient was advised to monitor wound closely and multiple times daily for any problems. Call clinic immediately or report to ED for immediate medical attention for any complications including reopening of wound, drainage, purulence, redness, streaking, odor, pain out of proportion, fever, chills, etc.   -- If there are signs of infection, please call Ortho Clinic [phone] for further instructions and to make appt to be seen.         ACTIVITY:   - light  -range of motion 0-90 x 4 weeks then ok ROMAT   - NWB x 2 more weeks then slowly advance weightbearing with walker     -PT/OT,  home health , Patient is responsible to establish and continue care      PAIN MEDICATION:   - Multimodal pain control  - Pain medication: refill was not needed,    - Pain medication refill policy provided to patient for review, yes.    - Patient was informed a multi-modal approach is used to treat their pain. With the goal to get off of narcotic pain medication and discontinue as soon as possible.   - ice and elevation to reduce pain and swelling     DVT PROPHYLAXIS:   - ASA 81 mg bid    FOLLOW UP:   - Patient will follow up in the clinic 4 weeks   -X-rays of the right knee needed

## 2025-06-16 ENCOUNTER — PATIENT MESSAGE (OUTPATIENT)
Dept: ORTHOPEDICS | Facility: CLINIC | Age: 55
End: 2025-06-16
Payer: COMMERCIAL

## 2025-06-16 RX ORDER — ASPIRIN 81 MG/1
81 TABLET ORAL 2 TIMES DAILY
Qty: 120 TABLET | Refills: 0 | Status: SHIPPED | OUTPATIENT
Start: 2025-06-16 | End: 2025-08-15

## 2025-07-08 ENCOUNTER — EXTERNAL HOME HEALTH (OUTPATIENT)
Dept: HOME HEALTH SERVICES | Facility: HOSPITAL | Age: 55
End: 2025-07-08
Payer: COMMERCIAL

## 2025-07-09 ENCOUNTER — OFFICE VISIT (OUTPATIENT)
Dept: ORTHOPEDICS | Facility: CLINIC | Age: 55
End: 2025-07-09
Payer: COMMERCIAL

## 2025-07-09 ENCOUNTER — HOSPITAL ENCOUNTER (OUTPATIENT)
Dept: RADIOLOGY | Facility: HOSPITAL | Age: 55
Discharge: HOME | End: 2025-07-09
Attending: PHYSICIAN ASSISTANT
Payer: COMMERCIAL

## 2025-07-09 VITALS — WEIGHT: 274.94 LBS | BODY MASS INDEX: 33.48 KG/M2 | HEIGHT: 76 IN

## 2025-07-09 DIAGNOSIS — S82.141A CLOSED BICONDYLAR FRACTURE OF RIGHT TIBIAL PLATEAU: Primary | ICD-10-CM

## 2025-07-09 DIAGNOSIS — S82.141A CLOSED BICONDYLAR FRACTURE OF RIGHT TIBIAL PLATEAU: ICD-10-CM

## 2025-07-09 PROCEDURE — 1160F RVW MEDS BY RX/DR IN RCRD: CPT | Mod: CPTII,S$GLB,, | Performed by: PHYSICIAN ASSISTANT

## 2025-07-09 PROCEDURE — 73560 X-RAY EXAM OF KNEE 1 OR 2: CPT | Mod: TC,LT

## 2025-07-09 PROCEDURE — 73590 X-RAY EXAM OF LOWER LEG: CPT | Mod: 26,RT,, | Performed by: RADIOLOGY

## 2025-07-09 PROCEDURE — 99024 POSTOP FOLLOW-UP VISIT: CPT | Mod: S$GLB,,, | Performed by: PHYSICIAN ASSISTANT

## 2025-07-09 PROCEDURE — 73590 X-RAY EXAM OF LOWER LEG: CPT | Mod: TC,RT

## 2025-07-09 PROCEDURE — 1159F MED LIST DOCD IN RCRD: CPT | Mod: CPTII,S$GLB,, | Performed by: PHYSICIAN ASSISTANT

## 2025-07-09 PROCEDURE — 3051F HG A1C>EQUAL 7.0%<8.0%: CPT | Mod: CPTII,S$GLB,, | Performed by: PHYSICIAN ASSISTANT

## 2025-07-09 PROCEDURE — 99999 PR PBB SHADOW E&M-EST. PATIENT-LVL IV: CPT | Mod: PBBFAC,,, | Performed by: PHYSICIAN ASSISTANT

## 2025-07-09 PROCEDURE — 73560 X-RAY EXAM OF KNEE 1 OR 2: CPT | Mod: 26,LT,, | Performed by: RADIOLOGY

## 2025-07-09 NOTE — PROGRESS NOTES
"Principal Orthopedic Problem: right bicondylar tibial plateau fracture      Relevant Medical History: according to chart    PMHX: DM    Ambulates without assistance@ baseline   Tobacco Use: Lifetime nonsmoker  Denies BOYD   Lives with his mother in [place], [place]. No stairs to enter the home.    Anticoagulation:  None at baseline  Immunosuppressive medications:  None at baseline  Occupation:  He works for [employer]  No hx of MI, CVA, DVT/PE  No hx of cancer, chemotherapy, or radiation        Lab Results   Component Value Date    HGBA1C 7.6 (H) 04/17/2025     Estimated body mass index is 33.46 kg/m² as calculated from the following:    Height as of this encounter: 6' 4" (1.93 m).    Weight as of this encounter: 124.7 kg (274 lb 14.6 oz).      HPI:   54 year old male, fall 3 feet out of a truck while at work on 04/19/25  RADS: Right bicondylar tibial plateau fracture     04/17/25: :   Spanning external fixation right bicondylar tibial plateau fracture - 20690  Closed treatment of right bicondylar tibial plateau fracture with manipulation under anesthesia     04/29/25: Urvashi:  Open reduction internal fixation right bicondylar tibial plateau fracture - 27536  Open reduction internal fixation right tibial tubercle - 88752  Removal, under anesthesia of external fixation right lower extremity - 20680      Mr. Wallace is here today for a post-operative visit    Interval History:  he reports that he is doing ok.   he is at home . he is  participating in PT/OT. Home health   Pain is controlled somewhat.  he is  taking pain medication.  Taking gabapentin, aspirin, norco prn.   he denies fever, chills, and sweats .     Physical exam:    Patient arrives to exam room: wheelchair.  Patient is  accompanied    Range of motion 0-100       RADS: All pertinent images were reviewed by myself:   There are proximal tibial fixation plates and fixation screws stabilizing a proximal tibial fracture which involves the lateral " tibial plateau articular surface with depression. There is also a fracture of the fibula. There is DJD. There are spurs on the patella. There is a joint effusion. No hardware failure complication seen.       Assessment:  Post-op visit     Plan:  Current care, treatment plan, precautions, activity level/ modifications, limitations, rehabilitation exercises and proposed future treatment were discussed with the patient. We discussed the need to monitor for changes in symptoms and condition and report them to the physician.  Discussed importance of compliance with all appointments and follow up examinations.     WOUND CARE :  - he may wash the area with antibacterial soap in the shower. Will not submerge until the incision is completely healed  -Patient was advised to monitor wound closely and multiple times daily for any problems. Call clinic immediately or report to ED for immediate medical attention for any complications including reopening of wound, drainage, purulence, redness, streaking, odor, pain out of proportion, fever, chills, etc.   -- If there are signs of infection, please call Ortho Clinic [phone] for further instructions and to make appt to be seen.         ACTIVITY:   - light  -range of motion 0-90 x 4 weeks then ok ROMAT   - weight bearing as tolerated     -PT/OT,  ordered , Patient is responsible to establish and continue care      PAIN MEDICATION:   - Multimodal pain control  - Pain medication: refill was not needed,    - Pain medication refill policy provided to patient for review, yes.    - Patient was informed a multi-modal approach is used to treat their pain. With the goal to get off of narcotic pain medication and discontinue as soon as possible.   - ice and elevation to reduce pain and swelling     DVT PROPHYLAXIS:   - ASA 81 mg bid    FOLLOW UP:   - Patient will follow up in the clinic 6 weeks   -X-rays of the right knee needed

## 2025-07-17 ENCOUNTER — DOCUMENT SCAN (OUTPATIENT)
Dept: HOME HEALTH SERVICES | Facility: HOSPITAL | Age: 55
End: 2025-07-17
Payer: COMMERCIAL

## 2025-07-17 ENCOUNTER — CLINICAL SUPPORT (OUTPATIENT)
Dept: REHABILITATION | Facility: HOSPITAL | Age: 55
End: 2025-07-17
Payer: OTHER MISCELLANEOUS

## 2025-07-17 DIAGNOSIS — M62.81 DECREASED MUSCLE STRENGTH: ICD-10-CM

## 2025-07-17 DIAGNOSIS — S82.141A CLOSED BICONDYLAR FRACTURE OF RIGHT TIBIAL PLATEAU: ICD-10-CM

## 2025-07-17 DIAGNOSIS — M25.661 DECREASED RANGE OF MOTION (ROM) OF RIGHT KNEE: Primary | ICD-10-CM

## 2025-07-17 PROCEDURE — 97110 THERAPEUTIC EXERCISES: CPT

## 2025-07-17 PROCEDURE — 97140 MANUAL THERAPY 1/> REGIONS: CPT

## 2025-07-17 PROCEDURE — 97161 PT EVAL LOW COMPLEX 20 MIN: CPT

## 2025-07-17 NOTE — PROGRESS NOTES
"  Outpatient Rehab    Physical Therapy Evaluation    Patient Name: Shlomo Wallace  MRN: [id number]  YOB: 1970  Encounter Date: 7/17/2025    Therapy Diagnosis:   Encounter Diagnoses   Name Primary?    Closed bicondylar fracture of right tibial plateau     Decreased range of motion (ROM) of right knee Yes    Decreased muscle strength      Physician: Mary Pinedo PA-C    Physician Orders: Eval and Treat  Medical Diagnosis: Closed bicondylar fracture of right tibial plateau  Surgical Diagnosis: Not applicable for this Episode   Surgical Date: Not applicable for this Episode  Days Since Last Surgery: Not applicable for this Episode    Visit # / Visits Authorized:  1 / 12  Insurance Authorization Period: 7/9/2025 to 7/9/2026  Date of Evaluation: 7/17/2025  Plan of Care Certification: 7/17/2025 to 10/9/2025     Time In:   1:00pm  Time Out:  2:00pm  Total Time (in minutes):   60  Total Billable Time (in minutes):  60    Intake Outcome Measure for FOTO Survey    Therapist reviewed FOTO scores for Shlomo Wallace on 7/17/2025.   FOTO report - see Media section or FOTO account episode details.     Intake Score: Not applicable for this Episode%    Precautions:       Subjective       The patient reports to physical therapy approx 11 weeks s/p R tibial plateau ORIF. He has been participating in Home health PT 2x/wk since his injury, but feels like he has hit a plateau and not where he wants to be. His initial mechanism of injury was jumping off of a truck trailer and felt his knee "give out" and fell directly to his knees on the ground. He is currently ambulating with a RW, and has trouble walking, going up stairs, squatting, straightening his knee, bending his knee. He denies any N/T. Right now his pain is a 2/10, at best is 0/10 when lying down, and at worst 2/10. His biggest pain location is medially along pes anserine that feels like cramping/muscle strain. He works at [employer], which involves going " up/down stairs to hit on the trailer (3 steps), and occasionally lifting 15-50# objects, but not often. He also reports enjoying playing basketball at the [employer]. His goal is to get back to his previous level of function.    Past Medical History/Physical Systems Review:   Shlomo Wallace  has a past medical history of Diabetes mellitus, type 2.    Shlomo Wallace  has a past surgical history that includes application, external fixation device, lower extremity (Right, 4/17/2025); Open reduction and internal fixation (ORIF) of fracture of tibial plateau (Right, 4/29/2025); Removal of external fixation device (Right, 4/29/2025); and orif, fracture, tibial tuberosity (Right, 4/29/2025).    Shlomo has a current medication list which includes the following prescription(s): aspirin, atorvastatin, blood sugar diagnostic, blood-glucose meter, dexcom g6 sensor, dexcom g6 transmitter, levemir flextouch u100 insulin, lancets, metformin, ondansetron, pantoprazole, pen needle, diabetic, and polyethylene glycol.    Review of patient's allergies indicates:  No Known Allergies     Objective            Gait: ambulation with RW, toe touch gait pattern, decreased willingness to accept load, decreased knee extension        Knee Passive Range of Motion:   Right  Left    Flexion 90 125   Extension Missing 20 from 0 10 HE       Ankle Active Range of Motion: R foot/ankle significantly restricted in all planes      Quad Set: poor, no visible quad tone, glute/hamstring compensatory pattern      Joint Mobility: hypomobile fat pad and patellar mobility       Palpation: no TTP            Special Tests: Not performed today due to post-op status   Strength: Not performed today due to post-op status.      Treatment         Shlomo received the treatments listed below:      therapeutic exercises to develop strength, endurance, ROM, flexibility, posture, and core stabilization for 15 minutes including:  Heel prop 10 min  Quad sets  10x 10 sec holds  Prone TKE 10x 10 sec holds  Tailgaters w/ inferior patella glide 5 min    manual therapy techniques: Joint mobilizations, Manual traction, Myofacial release, Manual Lymphatic Drainage, Soft tissue Mobilization, and Friction Massage were applied to the: knee/ankle for 10 minutes, including:  Patella mobs  Fat pad mobs    neuromuscular re-education activities to improve: Balance, Coordination, Kinesthetic, Sense, Proprioception, and Posture for 0 minutes. The following activities were included:      therapeutic activities to improve functional performance for 0  minutes, including:       Time Entry(in minutes):   25    Assessment & Plan   Assessment  Shlomo presents with a condition of Low complexity.   Presentation of Symptoms: Stable  Will Comorbidities Impact Care: No       Functional Limitations: Activity tolerance, Completing self-care activities, Proprioception, Range of motion, Participating in leisure activities, Pain with ADLs/IADLs, Gross motor coordination  Impairments: Pain with functional activity, Impaired physical strength  Personal Factors Affecting Prognosis: Pain    Prognosis: Good  Assessment Details: Patient presents to clinic 11 weeks s/p above procedure. They are significantly behind with regard to knee ROM, quad activation, and ability to ambulate. These deficits with range of motion, strength, and function limit his ability to participate in school, work, and recreational activities. They would benefit from skilled PT services to normalize kinetic chain mobility, strength, and function to safely return to their prior level of activity.    Plan  From a physical therapy perspective, the patient would benefit from: Skilled Rehab Services    Planned therapy interventions include: Therapeutic exercise, Therapeutic activities, Neuromuscular re-education, Manual therapy, ADLs/IADLs, Other (Comment), and Gait training. Dry Needling (prn)  Planned modalities to include: Biofeedback,  Electrical stimulation - attended, Electrical stimulation - passive/unattended, Thermotherapy (hot pack), and Cryotherapy (cold pack).        Visit Frequency: 2 times Per Week for 12 Weeks.       This plan was discussed with Patient.   Discussion participants: Agreed Upon Plan of Care  Plan details: Frequency and duration of treatment to be adjusted as needed          The patient's spiritual, cultural, and educational needs were considered, and the patient is agreeable to the plan of care and goals.           Goals:   Active       Long Term       Pt will demonstrate increased knee flexion AROM to 120 degrees or greater for return to functional activity       Start:  07/17/25            Pt will demonstrate increased knee extension AROM to symmetrical active hyperextension for standing ability.       Start:  07/17/25            Pt will demonstrate increased RLE strength by 4+/5 grade for improved functional stability       Start:  07/17/25            The patient will be independent amb with no assistive device on all surfaces for community distances.       Start:  07/17/25            Pt will be able to perform 10 consecutive pain free step ups to a 10 inch box for return to job duties.       Start:  07/17/25            Patient will improve FOTO score to </= see FOTO% limited to decrease perceived limitation with mobility.       Start:  07/17/25               Short Term       Pt to achieve symmetrical degrees of passive knee extension for restoring functional knee mobility, ambulating with proper gait pattern       Start:  07/17/25            Pt to achieve 100 degrees of passive knee flexion for restoring functional knee mobility, ambulating with proper gait pattern and sit to stand ability.        Start:  07/17/25            Pt to demonstrate symmetrical weight bearing w/o increased pain for stability and functional ambulation .       Start:  07/17/25            Pt will demonstrate improved quad activation by  performing a quad set for clearly visible quad tone and no compensatory pattens.       Start:  07/17/25            Pt to exhibit correct return demonstration of exercises for self-management and independence with HEP       Start:  07/17/25                Og Rivera, PT

## 2025-07-18 ENCOUNTER — PATIENT MESSAGE (OUTPATIENT)
Dept: REHABILITATION | Facility: HOSPITAL | Age: 55
End: 2025-07-18
Payer: COMMERCIAL

## 2025-07-22 ENCOUNTER — CLINICAL SUPPORT (OUTPATIENT)
Dept: REHABILITATION | Facility: HOSPITAL | Age: 55
End: 2025-07-22
Payer: OTHER MISCELLANEOUS

## 2025-07-22 DIAGNOSIS — M62.81 DECREASED MUSCLE STRENGTH: ICD-10-CM

## 2025-07-22 DIAGNOSIS — M25.661 DECREASED RANGE OF MOTION (ROM) OF RIGHT KNEE: Primary | ICD-10-CM

## 2025-07-22 PROCEDURE — 97140 MANUAL THERAPY 1/> REGIONS: CPT

## 2025-07-22 PROCEDURE — 97112 NEUROMUSCULAR REEDUCATION: CPT

## 2025-07-22 PROCEDURE — 97110 THERAPEUTIC EXERCISES: CPT

## 2025-07-22 NOTE — PROGRESS NOTES
Outpatient Rehab    Physical Therapy Visit    Patient Name: Shlomo Wallace  MRN: [id number]  YOB: 1970  Encounter Date: 7/22/2025    Therapy Diagnosis:   Encounter Diagnoses   Name Primary?    Decreased range of motion (ROM) of right knee Yes    Decreased muscle strength      Physician: Mary Pinedo PA-C    Physician Orders: Eval and Treat  Medical Diagnosis: Closed bicondylar fracture of right tibial plateau  Surgical Diagnosis: Not applicable for this Episode   Surgical Date: Not applicable for this Episode  Days Since Last Surgery: Not applicable for this Episode    Visit # / Visits Authorized:  2 / 12  Insurance Authorization Period: 7/9/2025 to 7/9/2026  Date of Evaluation: 7/17/2025  Plan of Care Certification: 7/17/2025 to 10/9/2025      PT/PTA:     Number of PTA visits since last PT visit:   Time In:   3:00pm  Time Out:  4:00pm  Total Time (in minutes):   60  Total Billable Time (in minutes):  60    FOTO:  Intake Score (%): Not applicable for this Episode  Survey Score 2 (%): Not applicable for this Episode  Survey Score 3 (%): Not applicable for this Episode    Precautions:         Subjective          Hasn't felt like he made any progress. He wants to know if he should get a cane or crutches.    Objective        7/22/2025  Knee Passive Range of Motion:    Right  Left    Flexion 90 125   Extension Pre- missing 25  Post missing 15 10 HE         Ankle Active Range of Motion: R foot/ankle significantly restricted in all planes        Quad Set: poor, no visible quad tone, glute/hamstring compensatory pattern- improved post NMES        Joint Mobility: hypomobile fat pad and patellar mobility - improved post jt mobs       Treatment            Shlomo received the treatments listed below:       therapeutic exercises to develop strength, endurance, ROM, flexibility, posture, and core stabilization for 15 minutes including:  Heel prop long sitting 10 min  Supine heel prop 5 min       manual  therapy techniques: Joint mobilizations, Manual traction, Myofacial release, Manual Lymphatic Drainage, Soft tissue Mobilization, and Friction Massage were applied to the: knee/ankle for 10 minutes, including:  Patella mobs  Fat pad mobs   lateral tibiofemoral jt mobs  neuromuscular re-education activities to improve: Balance, Coordination, Kinesthetic, Sense, Proprioception, and Posture for 35 minutes. The following activities were included:   NMES- [place] STIM 10 seconds on 20 seconds off  - QS 7 min  - Standing QS leaning against mat 5 min  - Standing TKE 5 min  - Prone TKE 7 min    Pt education: NMES- quad activation during ADLs     therapeutic activities to improve functional performance for 0  minutes, including:          Time Entry(in minutes):       Assessment & Plan   Assessment:     The patient completed today's session as listed above. Presented with continued significant irritbility and no change in knee extension rom from initial eval. Responded excellently today to manual and NMES for extension rom and quad activation, leading to very significant improvement in gait pattern post session. Pt educated on importance of quad activation during ambulation. Required heavy verbal and tactile cues for quad activation. Will continue to progress.    The patient will continue to benefit from skilled outpatient physical therapy in order to address the deficits listed in the problem list on the initial evaluation, provide patient and family education, and maximize the patients level of independence in the home and community environments.     The patient's spiritual, cultural, and educational needs were considered, and the patient is agreeable to the plan of care and goals.           Plan:  Continue POC    Goals:   Active       Long Term       Pt will demonstrate increased knee flexion AROM to 120 degrees or greater for return to functional activity       Start:  07/17/25            Pt will demonstrate increased  knee extension AROM to symmetrical active hyperextension for standing ability.       Start:  07/17/25            Pt will demonstrate increased RLE strength by 4+/5 grade for improved functional stability       Start:  07/17/25            The patient will be independent amb with no assistive device on all surfaces for community distances.       Start:  07/17/25            Pt will be able to perform 10 consecutive pain free step ups to a 10 inch box for return to job duties.       Start:  07/17/25            Patient will improve FOTO score to </= see FOTO% limited to decrease perceived limitation with mobility.       Start:  07/17/25               Short Term       Pt to achieve symmetrical degrees of passive knee extension for restoring functional knee mobility, ambulating with proper gait pattern       Start:  07/17/25            Pt to achieve 100 degrees of passive knee flexion for restoring functional knee mobility, ambulating with proper gait pattern and sit to stand ability.        Start:  07/17/25            Pt to demonstrate symmetrical weight bearing w/o increased pain for stability and functional ambulation .       Start:  07/17/25            Pt will demonstrate improved quad activation by performing a quad set for clearly visible quad tone and no compensatory pattens.       Start:  07/17/25            Pt to exhibit correct return demonstration of exercises for self-management and independence with HEP       Start:  07/17/25                Og Rivera, PT

## 2025-07-23 ENCOUNTER — PATIENT MESSAGE (OUTPATIENT)
Dept: ORTHOPEDICS | Facility: CLINIC | Age: 55
End: 2025-07-23
Payer: COMMERCIAL

## 2025-07-24 ENCOUNTER — CLINICAL SUPPORT (OUTPATIENT)
Dept: REHABILITATION | Facility: HOSPITAL | Age: 55
End: 2025-07-24
Payer: OTHER MISCELLANEOUS

## 2025-07-24 DIAGNOSIS — M62.81 DECREASED MUSCLE STRENGTH: ICD-10-CM

## 2025-07-24 DIAGNOSIS — S82.141A CLOSED BICONDYLAR FRACTURE OF RIGHT TIBIAL PLATEAU: Primary | ICD-10-CM

## 2025-07-24 DIAGNOSIS — M25.661 DECREASED RANGE OF MOTION (ROM) OF RIGHT KNEE: Primary | ICD-10-CM

## 2025-07-24 PROCEDURE — 97110 THERAPEUTIC EXERCISES: CPT

## 2025-07-24 PROCEDURE — 97112 NEUROMUSCULAR REEDUCATION: CPT

## 2025-07-24 PROCEDURE — 97140 MANUAL THERAPY 1/> REGIONS: CPT

## 2025-07-24 NOTE — PROGRESS NOTES
Outpatient Rehab    Physical Therapy Visit    Patient Name: Shlomo Wallace  MRN: [id number]  YOB: 1970  Encounter Date: 7/24/2025    Therapy Diagnosis:   Encounter Diagnoses   Name Primary?    Decreased range of motion (ROM) of right knee Yes    Decreased muscle strength      Physician: Mary Pinedo PA-C    Physician Orders: Eval and Treat  Medical Diagnosis: Closed bicondylar fracture of right tibial plateau  Surgical Diagnosis: Not applicable for this Episode   Surgical Date: Not applicable for this Episode  Days Since Last Surgery: Not applicable for this Episode    Visit # / Visits Authorized:  3 / 12  Insurance Authorization Period: 7/9/2025 to 7/9/2026  Date of Evaluation: 7/17/2025  Plan of Care Certification: 7/17/2025 to 10/9/2025      PT/PTA:     Number of PTA visits since last PT visit:   Time In:   3:00pm  Time Out:  4:00pm  Total Time (in minutes):   60  Total Billable Time (in minutes):  60    FOTO:  Intake Score (%): Not applicable for this Episode  Survey Score 2 (%): Not applicable for this Episode  Survey Score 3 (%): Not applicable for this Episode    Precautions:         Subjective          his quads are really sore after last visit. Hasn't been doing his exercises    Objective        7/22/2025  Knee Passive Range of Motion:    Right  Left    Flexion 90 125   Extension Pre- missing 25  Post missing 15 10 HE         Ankle Active Range of Motion: R foot/ankle significantly restricted in all planes        Quad Set: poor, no visible quad tone, glute/hamstring compensatory pattern- improved post NMES        Joint Mobility: hypomobile fat pad and patellar mobility - improved post jt mobs       Treatment            Shlomo received the treatments listed below:       therapeutic exercises to develop strength, endurance, ROM, flexibility, posture, and core stabilization for 15 minutes including:  Heel prop long sitting 10 min  Supine heel prop 5 min       manual therapy techniques:  Joint mobilizations, Manual traction, Myofacial release, Manual Lymphatic Drainage, Soft tissue Mobilization, and Friction Massage were applied to the: knee/ankle for 10 minutes, including:  Patella mobs  Fat pad mobs   lateral tibiofemoral jt mobs  neuromuscular re-education activities to improve: Balance, Coordination, Kinesthetic, Sense, Proprioception, and Posture for 35 minutes. The following activities were included:   NMES- [place] STIM 10 seconds on 20 seconds off  - QS 7 min  - Standing QS leaning against mat 5 min  - Standing TKE 5 min  - Prone TKE 7 min    Pt education: NMES- quad activation during ADLs     therapeutic activities to improve functional performance for 0  minutes, including:          Time Entry(in minutes):       Assessment & Plan   Assessment:     The patient completed today's session as listed above. Presented with continued significant irritbility and no change in knee extension rom from initial eval. Responded excellently today to manual and NMES for extension rom and quad activation, leading to very significant improvement in gait pattern post session. Pt educated on importance of quad activation during ambulation. Required heavy verbal and tactile cues for quad activation. Will continue to progress.    The patient will continue to benefit from skilled outpatient physical therapy in order to address the deficits listed in the problem list on the initial evaluation, provide patient and family education, and maximize the patients level of independence in the home and community environments.     The patient's spiritual, cultural, and educational needs were considered, and the patient is agreeable to the plan of care and goals.           Plan:  Continue POC    Goals:   Active       Long Term       Pt will demonstrate increased knee flexion AROM to 120 degrees or greater for return to functional activity       Start:  07/17/25            Pt will demonstrate increased knee extension AROM to  symmetrical active hyperextension for standing ability.       Start:  07/17/25            Pt will demonstrate increased RLE strength by 4+/5 grade for improved functional stability       Start:  07/17/25            The patient will be independent amb with no assistive device on all surfaces for community distances.       Start:  07/17/25            Pt will be able to perform 10 consecutive pain free step ups to a 10 inch box for return to job duties.       Start:  07/17/25            Patient will improve FOTO score to </= see FOTO% limited to decrease perceived limitation with mobility.       Start:  07/17/25               Short Term       Pt to achieve symmetrical degrees of passive knee extension for restoring functional knee mobility, ambulating with proper gait pattern       Start:  07/17/25            Pt to achieve 100 degrees of passive knee flexion for restoring functional knee mobility, ambulating with proper gait pattern and sit to stand ability.        Start:  07/17/25            Pt to demonstrate symmetrical weight bearing w/o increased pain for stability and functional ambulation .       Start:  07/17/25            Pt will demonstrate improved quad activation by performing a quad set for clearly visible quad tone and no compensatory pattens.       Start:  07/17/25            Pt to exhibit correct return demonstration of exercises for self-management and independence with HEP       Start:  07/17/25                Og Rivera, PT

## 2025-07-29 ENCOUNTER — CLINICAL SUPPORT (OUTPATIENT)
Dept: REHABILITATION | Facility: HOSPITAL | Age: 55
End: 2025-07-29
Payer: OTHER MISCELLANEOUS

## 2025-07-29 DIAGNOSIS — M62.81 DECREASED MUSCLE STRENGTH: ICD-10-CM

## 2025-07-29 DIAGNOSIS — M25.661 DECREASED RANGE OF MOTION (ROM) OF RIGHT KNEE: Primary | ICD-10-CM

## 2025-07-29 PROCEDURE — 97110 THERAPEUTIC EXERCISES: CPT

## 2025-07-29 PROCEDURE — 97140 MANUAL THERAPY 1/> REGIONS: CPT

## 2025-07-29 PROCEDURE — 97112 NEUROMUSCULAR REEDUCATION: CPT

## 2025-07-29 NOTE — PROGRESS NOTES
Outpatient Rehab    Physical Therapy Visit    Patient Name: Shlomo Wallace  MRN: [id number]  YOB: 1970  Encounter Date: 7/29/2025    Therapy Diagnosis:   Encounter Diagnoses   Name Primary?    Decreased range of motion (ROM) of right knee Yes    Decreased muscle strength      Physician: Mary Pinedo PA-C    Physician Orders: Eval and Treat  Medical Diagnosis: Closed bicondylar fracture of right tibial plateau  Surgical Diagnosis: Not applicable for this Episode   Surgical Date: Not applicable for this Episode  Days Since Last Surgery: Not applicable for this Episode    Visit # / Visits Authorized:  4 / 12  Insurance Authorization Period: 7/9/2025 to 7/9/2026  Date of Evaluation: 7/17/2025  Plan of Care Certification: 7/17/2025 to 10/9/2025      PT/PTA:     Number of PTA visits since last PT visit:   Time In:   3:00pm  Time Out:  4:00pm  Total Time (in minutes):   60  Total Billable Time (in minutes):  60    FOTO:  Intake Score (%): Not applicable for this Episode  Survey Score 2 (%): Not applicable for this Episode  Survey Score 3 (%): Not applicable for this Episode    Precautions:         Subjective          his quads are really sore after last visit. Hasn't been doing his exercises    Objective        7/22/2025  Knee Passive Range of Motion:    Right  Left    Flexion 90 125   Extension Pre- missing 25  Post missing 15 10 HE         Ankle Active Range of Motion: R foot/ankle significantly restricted in all planes        Quad Set: poor, no visible quad tone, glute/hamstring compensatory pattern- improved post NMES        Joint Mobility: hypomobile fat pad and patellar mobility - improved post jt mobs       Treatment            Shlomo received the treatments listed below:       therapeutic exercises to develop strength, endurance, ROM, flexibility, posture, and core stabilization for 10 minutes including:  Supine heel prop 10 min       manual therapy techniques: Joint mobilizations, Manual  traction, Myofacial release, Manual Lymphatic Drainage, Soft tissue Mobilization, and Friction Massage were applied to the: knee/ankle for 15 minutes, including:  Patella mobs  Fat pad mobs  TF jt mobs  TC manip  TC jt mobs  neuromuscular re-education activities to improve: Balance, Coordination, Kinesthetic, Sense, Proprioception, and Posture for 35 minutes. The following activities were included:   NMES- [place] STIM 10 seconds on 20 seconds off  - Standing QS leaning against mat 10 min  - Standing TKE 5 min  - Prone TKE 5 min    SL shuttle press 1 band 10 sec hold 3x10     therapeutic activities to improve functional performance for 0  minutes, including:          Time Entry(in minutes):       Assessment & Plan   Assessment:     The patient completed today's session as listed above. Presented with significantly improved patellar mobility carryover from previous visit, but poor carryover in knee extension rom.  A near 10 degree improvement pre to post session was noted in ROM. He does well with SL shuttle press for closed chain end range knee extension in available range, a new addition today. Today's focus was prioritizing closed chain to help facilitate proprioceptive feedback. Pt educated on importance of quad activation during ambulation. Required heavy verbal and tactile cues for quad activation. Will continue to progress.    The patient will continue to benefit from skilled outpatient physical therapy in order to address the deficits listed in the problem list on the initial evaluation, provide patient and family education, and maximize the patients level of independence in the home and community environments.     The patient's spiritual, cultural, and educational needs were considered, and the patient is agreeable to the plan of care and goals.           Plan:  Continue POC    Goals:   Active       Long Term       Pt will demonstrate increased knee flexion AROM to 120 degrees or greater for return to  functional activity       Start:  07/17/25            Pt will demonstrate increased knee extension AROM to symmetrical active hyperextension for standing ability.       Start:  07/17/25            Pt will demonstrate increased RLE strength by 4+/5 grade for improved functional stability       Start:  07/17/25            The patient will be independent amb with no assistive device on all surfaces for community distances.       Start:  07/17/25            Pt will be able to perform 10 consecutive pain free step ups to a 10 inch box for return to job duties.       Start:  07/17/25            Patient will improve FOTO score to </= see FOTO% limited to decrease perceived limitation with mobility.       Start:  07/17/25               Short Term       Pt to achieve symmetrical degrees of passive knee extension for restoring functional knee mobility, ambulating with proper gait pattern       Start:  07/17/25            Pt to achieve 100 degrees of passive knee flexion for restoring functional knee mobility, ambulating with proper gait pattern and sit to stand ability.        Start:  07/17/25            Pt to demonstrate symmetrical weight bearing w/o increased pain for stability and functional ambulation .       Start:  07/17/25            Pt will demonstrate improved quad activation by performing a quad set for clearly visible quad tone and no compensatory pattens.       Start:  07/17/25            Pt to exhibit correct return demonstration of exercises for self-management and independence with HEP       Start:  07/17/25                Og Rivera, PT

## 2025-07-31 ENCOUNTER — CLINICAL SUPPORT (OUTPATIENT)
Dept: REHABILITATION | Facility: HOSPITAL | Age: 55
End: 2025-07-31
Payer: OTHER MISCELLANEOUS

## 2025-07-31 DIAGNOSIS — M25.661 DECREASED RANGE OF MOTION (ROM) OF RIGHT KNEE: Primary | ICD-10-CM

## 2025-07-31 DIAGNOSIS — M62.81 DECREASED MUSCLE STRENGTH: ICD-10-CM

## 2025-07-31 PROCEDURE — 97110 THERAPEUTIC EXERCISES: CPT

## 2025-07-31 PROCEDURE — 97112 NEUROMUSCULAR REEDUCATION: CPT

## 2025-07-31 PROCEDURE — 97140 MANUAL THERAPY 1/> REGIONS: CPT

## 2025-07-31 NOTE — PROGRESS NOTES
Outpatient Rehab    Physical Therapy Visit    Patient Name: Shlomo Wallace  MRN: [id number]  YOB: 1970  Encounter Date: 7/31/2025    Therapy Diagnosis:   Encounter Diagnoses   Name Primary?    Decreased range of motion (ROM) of right knee Yes    Decreased muscle strength      Physician: Mary Pinedo PA-C    Physician Orders: Eval and Treat  Medical Diagnosis: Closed bicondylar fracture of right tibial plateau  Surgical Diagnosis: Not applicable for this Episode   Surgical Date: Not applicable for this Episode  Days Since Last Surgery: Not applicable for this Episode    Visit # / Visits Authorized:  5 / 12  Insurance Authorization Period: 7/9/2025 to 7/9/2026  Date of Evaluation: 7/17/2025  Plan of Care Certification: 7/17/2025 to 10/9/2025      PT/PTA:     Number of PTA visits since last PT visit:   Time In:   10:00am  Time Out:  11:05am  Total Time (in minutes):   65  Total Billable Time (in minutes):  65    FOTO:  Intake Score (%): Not applicable for this Episode  Survey Score 2 (%): Not applicable for this Episode  Survey Score 3 (%): Not applicable for this Episode    Precautions:         Subjective          He feels like he's not making any progress at home. He is open to getting a [employer] prescription. He was partially compliant with HEP.    Objective        7/31/2025  Knee Passive Range of Motion:    Right  Left    Flexion 90 125   Extension Pre- missing 20  Post missing 10 10 HE         Ankle Active Range of Motion: R foot/ankle significantly restricted in all planes        Quad Set: poor, no visible quad tone, glute/hamstring compensatory pattern- improved post NMES        Joint Mobility: hypomobile fat pad, TF, proximal tibfib, and patellar mobility - improved post jt mobs       Treatment            Shlomo received the treatments listed below:       therapeutic exercises to develop strength, endurance, ROM, flexibility, posture, and core stabilization for 10 minutes  including:  Supine heel prop 10 min       manual therapy techniques: Joint mobilizations, Manual traction, Myofacial release, Manual Lymphatic Drainage, Soft tissue Mobilization, and Friction Massage were applied to the: knee/ankle for 25 minutes, including:  Patella mobs  Fat pad mobs  TF jt mobs  TC manip  TC jt mobs  neuromuscular re-education activities to improve: Balance, Coordination, Kinesthetic, Sense, Proprioception, and Posture for 35 minutes. The following activities were included:   NMES- [place] STIM 10 seconds on 20 seconds off  - Standing QS leaning against mat 10 min  - Long sitting QS 10 min  - Prone TKE 5 min    SL shuttle press 1 band 10 sec hold 3x10     therapeutic activities to improve functional performance for 0  minutes, including:          Time Entry(in minutes):       Assessment & Plan   Assessment:     The patient completed today's session as listed above. Presented with significantly improved patellar mobility carryover from previous visit, and min carryover in knee extension rom (5 degrees).  A near 10 degree improvement pre to post session was noted in ROM. He does well with SL shuttle press for closed chain end range knee extension in available range, a new addition today. Today's focus was prioritizing closed chain to help facilitate proprioceptive feedback. Pt educated on importance of quad activation during ambulation. Pt educated heavily on need for compliance with HEP. Will continue to progress.    The patient will continue to benefit from skilled outpatient physical therapy in order to address the deficits listed in the problem list on the initial evaluation, provide patient and family education, and maximize the patients level of independence in the home and community environments.     The patient's spiritual, cultural, and educational needs were considered, and the patient is agreeable to the plan of care and goals.           Plan:  Continue POC    Goals:   Active       Long  Term       Pt will demonstrate increased knee flexion AROM to 120 degrees or greater for return to functional activity       Start:  07/17/25            Pt will demonstrate increased knee extension AROM to symmetrical active hyperextension for standing ability.       Start:  07/17/25            Pt will demonstrate increased RLE strength by 4+/5 grade for improved functional stability       Start:  07/17/25            The patient will be independent amb with no assistive device on all surfaces for community distances.       Start:  07/17/25            Pt will be able to perform 10 consecutive pain free step ups to a 10 inch box for return to job duties.       Start:  07/17/25            Patient will improve FOTO score to </= see FOTO% limited to decrease perceived limitation with mobility.       Start:  07/17/25               Short Term       Pt to achieve symmetrical degrees of passive knee extension for restoring functional knee mobility, ambulating with proper gait pattern       Start:  07/17/25            Pt to achieve 100 degrees of passive knee flexion for restoring functional knee mobility, ambulating with proper gait pattern and sit to stand ability.        Start:  07/17/25            Pt to demonstrate symmetrical weight bearing w/o increased pain for stability and functional ambulation .       Start:  07/17/25            Pt will demonstrate improved quad activation by performing a quad set for clearly visible quad tone and no compensatory pattens.       Start:  07/17/25            Pt to exhibit correct return demonstration of exercises for self-management and independence with HEP       Start:  07/17/25                Og Rivera, PT

## 2025-08-04 ENCOUNTER — CLINICAL SUPPORT (OUTPATIENT)
Dept: REHABILITATION | Facility: HOSPITAL | Age: 55
End: 2025-08-04
Payer: OTHER MISCELLANEOUS

## 2025-08-04 DIAGNOSIS — M25.661 DECREASED RANGE OF MOTION (ROM) OF RIGHT KNEE: Primary | ICD-10-CM

## 2025-08-04 DIAGNOSIS — M62.81 DECREASED MUSCLE STRENGTH: ICD-10-CM

## 2025-08-04 PROCEDURE — 97112 NEUROMUSCULAR REEDUCATION: CPT

## 2025-08-04 PROCEDURE — 97110 THERAPEUTIC EXERCISES: CPT

## 2025-08-04 PROCEDURE — 97140 MANUAL THERAPY 1/> REGIONS: CPT

## 2025-08-04 NOTE — PROGRESS NOTES
Outpatient Rehab    Physical Therapy Visit    Patient Name: Shlomo Wallace  MRN: [id number]  YOB: 1970  Encounter Date: 8/4/2025    Therapy Diagnosis:   Encounter Diagnoses   Name Primary?    Decreased range of motion (ROM) of right knee Yes    Decreased muscle strength      Physician: Mary Pinedo PA-C    Physician Orders: Eval and Treat  Medical Diagnosis: Closed bicondylar fracture of right tibial plateau  Surgical Diagnosis: Not applicable for this Episode   Surgical Date: Not applicable for this Episode  Days Since Last Surgery: Not applicable for this Episode    Visit # / Visits Authorized:  6 / 12  Insurance Authorization Period: 7/9/2025 to 7/9/2026  Date of Evaluation: 7/17/2025  Plan of Care Certification: 7/17/2025 to 10/9/2025      PT/PTA:     Number of PTA visits since last PT visit:   Time In:   10:00am  Time Out:  11:15 am  Total Time (in minutes):   75  Total Billable Time (in minutes):  75    FOTO:  Intake Score (%): Not applicable for this Episode  Survey Score 2 (%): Not applicable for this Episode  Survey Score 3 (%): Not applicable for this Episode    Precautions:         Subjective          He got his gym membership and has been going. States he's being compliant with HEP, but his mother reports partial compliance.    Objective        8/4/2025  Knee Passive Range of Motion:    Right  Left    Flexion 90 125   Extension Pre- missing 20  Post missing 15 10 HE         Ankle Active Range of Motion: R foot/ankle significantly restricted in all planes        Quad Set: poor, no visible quad tone, glute/hamstring compensatory pattern- improved post NMES        Joint Mobility: hypomobile fat pad, TF, proximal tibfib, and patellar mobility - improved post jt mobs       Treatment            Shlomo received the treatments listed below:       therapeutic exercises to develop strength, endurance, ROM, flexibility, posture, and core stabilization for 15 minutes including:  Supine heel  prop 10 min     closed chain dorsiflexion mob on box 25x  manual therapy techniques: Joint mobilizations, Manual traction, Myofacial release, Manual Lymphatic Drainage, Soft tissue Mobilization, and Friction Massage were applied to the: knee/ankle for 25 minutes, including:  Patella mobs  Fat pad mobs  TF jt mobs  TC manip  TC jt mobs  neuromuscular re-education activities to improve: Balance, Coordination, Kinesthetic, Sense, Proprioception, and Posture for 35 minutes. The following activities were included:   NMES- [place] STIM 10 seconds on 20 seconds off  - Standing QS leaning against mat 10 min  - Supine QS w/ intermittent patellar and fat pad mobs10 min  - Prone TKE 5 min    SL shuttle press 1 band 10 sec hold 3x10     therapeutic activities to improve functional performance for 0  minutes, including:          Time Entry(in minutes):       Assessment & Plan   Assessment:     The patient completed today's session as listed above. Presented with poor carryover from previous session in knee extension ROM, and continued significant improvement pre to post session. Modified Quad sets to supine today to help decrease guarding. Added closed chain dorsiflexion mobs to help improve early heel off during stance phase of gait. Pt educated on importance of quad activation during ambulation and compliance with HEP.  Will continue to progress.    The patient will continue to benefit from skilled outpatient physical therapy in order to address the deficits listed in the problem list on the initial evaluation, provide patient and family education, and maximize the patients level of independence in the home and community environments.     The patient's spiritual, cultural, and educational needs were considered, and the patient is agreeable to the plan of care and goals.           Plan:  Continue POC    Goals:   Active       Long Term       Pt will demonstrate increased knee flexion AROM to 120 degrees or greater for return to  functional activity       Start:  07/17/25            Pt will demonstrate increased knee extension AROM to symmetrical active hyperextension for standing ability.       Start:  07/17/25            Pt will demonstrate increased RLE strength by 4+/5 grade for improved functional stability       Start:  07/17/25            The patient will be independent amb with no assistive device on all surfaces for community distances.       Start:  07/17/25            Pt will be able to perform 10 consecutive pain free step ups to a 10 inch box for return to job duties.       Start:  07/17/25            Patient will improve FOTO score to </= see FOTO% limited to decrease perceived limitation with mobility.       Start:  07/17/25               Short Term       Pt to achieve symmetrical degrees of passive knee extension for restoring functional knee mobility, ambulating with proper gait pattern       Start:  07/17/25            Pt to achieve 100 degrees of passive knee flexion for restoring functional knee mobility, ambulating with proper gait pattern and sit to stand ability.        Start:  07/17/25            Pt to demonstrate symmetrical weight bearing w/o increased pain for stability and functional ambulation .       Start:  07/17/25            Pt will demonstrate improved quad activation by performing a quad set for clearly visible quad tone and no compensatory pattens.       Start:  07/17/25            Pt to exhibit correct return demonstration of exercises for self-management and independence with HEP       Start:  07/17/25                Og Rivera, PT

## 2025-08-06 ENCOUNTER — CLINICAL SUPPORT (OUTPATIENT)
Dept: REHABILITATION | Facility: HOSPITAL | Age: 55
End: 2025-08-06
Payer: OTHER MISCELLANEOUS

## 2025-08-06 DIAGNOSIS — M25.661 DECREASED RANGE OF MOTION (ROM) OF RIGHT KNEE: Primary | ICD-10-CM

## 2025-08-06 DIAGNOSIS — M62.81 DECREASED MUSCLE STRENGTH: ICD-10-CM

## 2025-08-06 PROCEDURE — 97140 MANUAL THERAPY 1/> REGIONS: CPT

## 2025-08-06 PROCEDURE — 97110 THERAPEUTIC EXERCISES: CPT

## 2025-08-06 NOTE — PROGRESS NOTES
Outpatient Rehab    Physical Therapy Visit    Patient Name: Shlomo Wallace  MRN: [id number]  YOB: 1970  Encounter Date: 8/6/2025    Therapy Diagnosis:   Encounter Diagnoses   Name Primary?    Decreased range of motion (ROM) of right knee Yes    Decreased muscle strength      Physician: Mary Pinedo PA-C    Physician Orders: Eval and Treat  Medical Diagnosis: Closed bicondylar fracture of right tibial plateau  Surgical Diagnosis: Not applicable for this Episode   Surgical Date: Not applicable for this Episode  Days Since Last Surgery: Not applicable for this Episode    Visit # / Visits Authorized:  7 / 12  Insurance Authorization Period: 7/9/2025 to 7/9/2026  Date of Evaluation: 7/17/2025  Plan of Care Certification: 7/17/2025 to 10/9/2025      PT/PTA:     Number of PTA visits since last PT visit:   Time In:   10:00am  Time Out:  11:15 am  Total Time (in minutes):   75  Total Billable Time (in minutes):  75    FOTO:  Intake Score (%): Not applicable for this Episode  Survey Score 2 (%): Not applicable for this Episode  Survey Score 3 (%): Not applicable for this Episode    Precautions:         Subjective          Feels like he is walking better.    Objective        8/6/2025  Knee Passive Range of Motion:    Right  Left    Flexion 90 125   Extension Pre- missing 20  Post missing 15 10 HE         Ankle Active Range of Motion: R foot/ankle significantly restricted in all planes        Quad Set: poor, no visible quad tone, glute/hamstring compensatory pattern- improved post NMES        Joint Mobility: hypomobile fat pad, TF, proximal tibfib, and patellar mobility - improved post jt mobs       Treatment            Shlomo received the treatments listed below:       therapeutic exercises to develop strength, endurance, ROM, flexibility, posture, and core stabilization for 45 minutes including:  Supine heel prop 10 min  Hinge knee extension mob w/ quad set w/ strap 5 sec holds 20x  Prone knee hand w/  intermittent hamstring curls 2 min hold>> 10 hamstring curls 5 rounds  Reassessment  Pt education     closed chain dorsiflexion mob on box 25x-NT  manual therapy techniques: Joint mobilizations, Manual traction, Myofacial release, Manual Lymphatic Drainage, Soft tissue Mobilization, and Friction Massage were applied to the: knee/ankle for 15 minutes, including:  Patella mobs  Fat pad mobs  TF jt mobs  TC manip  TC jt mobs  neuromuscular re-education activities to improve: Balance, Coordination, Kinesthetic, Sense, Proprioception, and Posture for 0 minutes. The following activities were included:            NT:   NMES- [place] STIM 10 seconds on 20 seconds off  - Standing QS leaning against mat 10 min  - Supine QS w/ intermittent patellar and fat pad mobs10 min  - Prone TKE 5 min    SL shuttle press 1 band 10 sec hold 3x10     therapeutic activities to improve functional performance for 0  minutes, including:          Time Entry(in minutes):       Assessment & Plan   Assessment:     The patient completed today's session as listed above. Presented with poor carryover from previous session in knee extension ROM, and continued significant improvement pre to post session. Intro'd prone hangs w/ intermittent hamstring curls for tissue extensibility, and decreased hamstring guarding w/ fatigue. Updated the patient's HEP, and educated on compliance strategies.  Will continue to progress.    The patient will continue to benefit from skilled outpatient physical therapy in order to address the deficits listed in the problem list on the initial evaluation, provide patient and family education, and maximize the patients level of independence in the home and community environments.     The patient's spiritual, cultural, and educational needs were considered, and the patient is agreeable to the plan of care and goals.           Plan:  Continue POC    Goals:   Active       Long Term       Pt will demonstrate increased knee flexion  AROM to 120 degrees or greater for return to functional activity       Start:  07/17/25            Pt will demonstrate increased knee extension AROM to symmetrical active hyperextension for standing ability.       Start:  07/17/25            Pt will demonstrate increased RLE strength by 4+/5 grade for improved functional stability       Start:  07/17/25            The patient will be independent amb with no assistive device on all surfaces for community distances.       Start:  07/17/25            Pt will be able to perform 10 consecutive pain free step ups to a 10 inch box for return to job duties.       Start:  07/17/25            Patient will improve FOTO score to </= see FOTO% limited to decrease perceived limitation with mobility.       Start:  07/17/25               Short Term       Pt to achieve symmetrical degrees of passive knee extension for restoring functional knee mobility, ambulating with proper gait pattern       Start:  07/17/25            Pt to achieve 100 degrees of passive knee flexion for restoring functional knee mobility, ambulating with proper gait pattern and sit to stand ability.        Start:  07/17/25            Pt to demonstrate symmetrical weight bearing w/o increased pain for stability and functional ambulation .       Start:  07/17/25            Pt will demonstrate improved quad activation by performing a quad set for clearly visible quad tone and no compensatory pattens.       Start:  07/17/25            Pt to exhibit correct return demonstration of exercises for self-management and independence with HEP       Start:  07/17/25                Og Rivera, PT      Jean Gonzalez PT, DPT

## 2025-08-12 ENCOUNTER — CLINICAL SUPPORT (OUTPATIENT)
Dept: REHABILITATION | Facility: HOSPITAL | Age: 55
End: 2025-08-12
Payer: OTHER MISCELLANEOUS

## 2025-08-12 DIAGNOSIS — M62.81 DECREASED MUSCLE STRENGTH: ICD-10-CM

## 2025-08-12 DIAGNOSIS — M25.661 DECREASED RANGE OF MOTION (ROM) OF RIGHT KNEE: Primary | ICD-10-CM

## 2025-08-12 PROCEDURE — 97110 THERAPEUTIC EXERCISES: CPT

## 2025-08-12 PROCEDURE — 97140 MANUAL THERAPY 1/> REGIONS: CPT

## 2025-08-12 PROCEDURE — 97112 NEUROMUSCULAR REEDUCATION: CPT

## 2025-08-14 ENCOUNTER — CLINICAL SUPPORT (OUTPATIENT)
Dept: REHABILITATION | Facility: HOSPITAL | Age: 55
End: 2025-08-14
Payer: OTHER MISCELLANEOUS

## 2025-08-14 DIAGNOSIS — M25.661 DECREASED RANGE OF MOTION (ROM) OF RIGHT KNEE: Primary | ICD-10-CM

## 2025-08-14 DIAGNOSIS — M62.81 DECREASED MUSCLE STRENGTH: ICD-10-CM

## 2025-08-14 PROCEDURE — 97112 NEUROMUSCULAR REEDUCATION: CPT

## 2025-08-14 PROCEDURE — 97140 MANUAL THERAPY 1/> REGIONS: CPT

## 2025-08-14 PROCEDURE — 97110 THERAPEUTIC EXERCISES: CPT

## 2025-08-14 PROCEDURE — 97530 THERAPEUTIC ACTIVITIES: CPT

## 2025-08-15 DIAGNOSIS — S82.141A CLOSED BICONDYLAR FRACTURE OF RIGHT TIBIAL PLATEAU: Primary | ICD-10-CM

## 2025-08-18 ENCOUNTER — CLINICAL SUPPORT (OUTPATIENT)
Dept: REHABILITATION | Facility: HOSPITAL | Age: 55
End: 2025-08-18
Payer: OTHER MISCELLANEOUS

## 2025-08-18 DIAGNOSIS — M25.661 DECREASED RANGE OF MOTION (ROM) OF RIGHT KNEE: Primary | ICD-10-CM

## 2025-08-18 DIAGNOSIS — M62.81 DECREASED MUSCLE STRENGTH: ICD-10-CM

## 2025-08-18 PROCEDURE — 97530 THERAPEUTIC ACTIVITIES: CPT

## 2025-08-18 PROCEDURE — 97110 THERAPEUTIC EXERCISES: CPT

## 2025-08-18 PROCEDURE — 97140 MANUAL THERAPY 1/> REGIONS: CPT

## 2025-08-18 PROCEDURE — 97112 NEUROMUSCULAR REEDUCATION: CPT

## 2025-08-19 DIAGNOSIS — S82.141A CLOSED BICONDYLAR FRACTURE OF RIGHT TIBIAL PLATEAU: Primary | ICD-10-CM

## 2025-08-20 ENCOUNTER — OFFICE VISIT (OUTPATIENT)
Dept: ORTHOPEDICS | Facility: CLINIC | Age: 55
End: 2025-08-20
Payer: COMMERCIAL

## 2025-08-20 ENCOUNTER — HOSPITAL ENCOUNTER (OUTPATIENT)
Dept: RADIOLOGY | Facility: HOSPITAL | Age: 55
Discharge: HOME | End: 2025-08-20
Attending: ORTHOPAEDIC SURGERY
Payer: OTHER MISCELLANEOUS

## 2025-08-20 ENCOUNTER — CLINICAL SUPPORT (OUTPATIENT)
Dept: REHABILITATION | Facility: HOSPITAL | Age: 55
End: 2025-08-20
Payer: OTHER MISCELLANEOUS

## 2025-08-20 VITALS — HEIGHT: 76 IN | WEIGHT: 274.94 LBS | BODY MASS INDEX: 33.48 KG/M2

## 2025-08-20 DIAGNOSIS — M25.661 DECREASED RANGE OF MOTION (ROM) OF RIGHT KNEE: Primary | ICD-10-CM

## 2025-08-20 DIAGNOSIS — M62.81 DECREASED MUSCLE STRENGTH: ICD-10-CM

## 2025-08-20 DIAGNOSIS — S82.141A CLOSED BICONDYLAR FRACTURE OF RIGHT TIBIAL PLATEAU: ICD-10-CM

## 2025-08-20 DIAGNOSIS — S82.151D CLOSED DISPLACED FRACTURE OF RIGHT TIBIAL TUBEROSITY WITH ROUTINE HEALING, SUBSEQUENT ENCOUNTER: ICD-10-CM

## 2025-08-20 PROCEDURE — 3008F BODY MASS INDEX DOCD: CPT | Mod: CPTII,S$GLB,, | Performed by: ORTHOPAEDIC SURGERY

## 2025-08-20 PROCEDURE — 97140 MANUAL THERAPY 1/> REGIONS: CPT

## 2025-08-20 PROCEDURE — 1159F MED LIST DOCD IN RCRD: CPT | Mod: CPTII,S$GLB,, | Performed by: ORTHOPAEDIC SURGERY

## 2025-08-20 PROCEDURE — 73590 X-RAY EXAM OF LOWER LEG: CPT | Mod: TC,RT

## 2025-08-20 PROCEDURE — 99214 OFFICE O/P EST MOD 30 MIN: CPT | Mod: S$GLB,,, | Performed by: ORTHOPAEDIC SURGERY

## 2025-08-20 PROCEDURE — 73560 X-RAY EXAM OF KNEE 1 OR 2: CPT | Mod: TC,RT

## 2025-08-20 PROCEDURE — 99999 PR PBB SHADOW E&M-EST. PATIENT-LVL III: CPT | Mod: PBBFAC,,, | Performed by: ORTHOPAEDIC SURGERY

## 2025-08-20 PROCEDURE — 3051F HG A1C>EQUAL 7.0%<8.0%: CPT | Mod: CPTII,S$GLB,, | Performed by: ORTHOPAEDIC SURGERY

## 2025-08-20 PROCEDURE — 97110 THERAPEUTIC EXERCISES: CPT

## 2025-08-20 PROCEDURE — 97112 NEUROMUSCULAR REEDUCATION: CPT

## 2025-08-23 ENCOUNTER — DOCUMENT SCAN (OUTPATIENT)
Dept: HOME HEALTH SERVICES | Facility: HOSPITAL | Age: 55
End: 2025-08-23
Payer: COMMERCIAL

## 2025-08-25 ENCOUNTER — CLINICAL SUPPORT (OUTPATIENT)
Dept: REHABILITATION | Facility: HOSPITAL | Age: 55
End: 2025-08-25
Payer: OTHER MISCELLANEOUS

## 2025-08-25 DIAGNOSIS — M62.81 DECREASED MUSCLE STRENGTH: ICD-10-CM

## 2025-08-25 DIAGNOSIS — M25.661 DECREASED RANGE OF MOTION (ROM) OF RIGHT KNEE: Primary | ICD-10-CM

## 2025-08-25 PROCEDURE — 97112 NEUROMUSCULAR REEDUCATION: CPT

## 2025-08-25 PROCEDURE — 97140 MANUAL THERAPY 1/> REGIONS: CPT

## 2025-08-25 PROCEDURE — 97110 THERAPEUTIC EXERCISES: CPT

## 2025-08-27 ENCOUNTER — CLINICAL SUPPORT (OUTPATIENT)
Dept: REHABILITATION | Facility: HOSPITAL | Age: 55
End: 2025-08-27
Payer: OTHER MISCELLANEOUS

## 2025-08-27 ENCOUNTER — EXTERNAL HOME HEALTH (OUTPATIENT)
Dept: HOME HEALTH SERVICES | Facility: HOSPITAL | Age: 55
End: 2025-08-27
Payer: COMMERCIAL

## 2025-08-27 DIAGNOSIS — M25.661 DECREASED RANGE OF MOTION (ROM) OF RIGHT KNEE: Primary | ICD-10-CM

## 2025-08-27 DIAGNOSIS — M62.81 DECREASED MUSCLE STRENGTH: ICD-10-CM

## 2025-08-27 PROCEDURE — 97140 MANUAL THERAPY 1/> REGIONS: CPT

## 2025-08-27 PROCEDURE — 97110 THERAPEUTIC EXERCISES: CPT

## 2025-08-27 PROCEDURE — 97112 NEUROMUSCULAR REEDUCATION: CPT

## 2025-09-02 ENCOUNTER — CLINICAL SUPPORT (OUTPATIENT)
Dept: REHABILITATION | Facility: HOSPITAL | Age: 55
End: 2025-09-02
Payer: OTHER MISCELLANEOUS

## 2025-09-02 DIAGNOSIS — M25.661 DECREASED RANGE OF MOTION (ROM) OF RIGHT KNEE: Primary | ICD-10-CM

## 2025-09-02 DIAGNOSIS — M62.81 DECREASED MUSCLE STRENGTH: ICD-10-CM

## 2025-09-02 PROCEDURE — 97110 THERAPEUTIC EXERCISES: CPT

## 2025-09-02 PROCEDURE — 97140 MANUAL THERAPY 1/> REGIONS: CPT

## 2025-09-02 PROCEDURE — 97112 NEUROMUSCULAR REEDUCATION: CPT

## 2025-09-03 ENCOUNTER — DOCUMENT SCAN (OUTPATIENT)
Dept: HOME HEALTH SERVICES | Facility: HOSPITAL | Age: 55
End: 2025-09-03
Payer: COMMERCIAL

## 2025-09-04 ENCOUNTER — CLINICAL SUPPORT (OUTPATIENT)
Dept: REHABILITATION | Facility: HOSPITAL | Age: 55
End: 2025-09-04
Payer: OTHER MISCELLANEOUS

## 2025-09-04 DIAGNOSIS — M25.661 DECREASED RANGE OF MOTION (ROM) OF RIGHT KNEE: Primary | ICD-10-CM

## 2025-09-04 DIAGNOSIS — M62.81 DECREASED MUSCLE STRENGTH: ICD-10-CM

## 2025-09-04 PROCEDURE — 97140 MANUAL THERAPY 1/> REGIONS: CPT

## 2025-09-04 PROCEDURE — 97112 NEUROMUSCULAR REEDUCATION: CPT

## 2025-09-04 PROCEDURE — 97110 THERAPEUTIC EXERCISES: CPT

## (undated) DEVICE — DRAPE TOP 53X102IN

## (undated) DEVICE — BIT DRILL BONE QC 3.5X195MM SS

## (undated) DEVICE — SPONGE LAP 18X18 PREWASHED

## (undated) DEVICE — SUT VICRYL PLUS 0 CT1 18IN

## (undated) DEVICE — DRAPE STERI U-SHAPED 47X51IN

## (undated) DEVICE — TRAY MINOR ORTHO OMC

## (undated) DEVICE — DRAPE C-ARMOR EQUIPMENT COVER

## (undated) DEVICE — WIRE-K 20MM X 150MM.
Type: IMPLANTABLE DEVICE | Site: TIBIA | Status: NON-FUNCTIONAL
Removed: 2025-04-29

## (undated) DEVICE — DRAPE T EXTRM SURG 121X128X90

## (undated) DEVICE — SOCKINETTE IMPERVIOUS 12X48IN

## (undated) DEVICE — BIT DRILL CALIB 80MM 2.5X170MM

## (undated) DEVICE — SEE MEDLINE ITEM 157171

## (undated) DEVICE — BIT DRILL CALIB 45MM 2.5X135MM

## (undated) DEVICE — PREVENA RESTOR

## (undated) DEVICE — STAPLER SKIN PROXIMATE WIDE

## (undated) DEVICE — DRESSING MEPILEX FLEX 4X4IN

## (undated) DEVICE — DRAPE THREE-QTR REINF 53X77IN

## (undated) DEVICE — TOURNIQUET SB QC DP 34X4IN

## (undated) DEVICE — CATH SUCTION 10FR

## (undated) DEVICE — SEE MEDLINE ITEM 157150

## (undated) DEVICE — BNDG COFLEX FOAM LF2 ST 6X5YD

## (undated) DEVICE — TAPE SURG DURAPORE 2 X10YD

## (undated) DEVICE — PENCIL ROCKER SWITCH 10FT CORD

## (undated) DEVICE — APPLICATOR CHLORAPREP ORN 26ML

## (undated) DEVICE — BIT DRILL QC STRL SS 3.2X145

## (undated) DEVICE — SUT VICRYL PLUS 3-0 SH 18IN

## (undated) DEVICE — BLADE SURG CARBON STEEL #10

## (undated) DEVICE — SUT VICRYL PLUS 2-0 CT1 18

## (undated) DEVICE — SPONGE COTTON TRAY 4X4IN

## (undated) DEVICE — BIT DRLL CALIB 45MM 2.8X135MM

## (undated) DEVICE — DRAPE C-ARM ELAS CLIP 42X120IN

## (undated) DEVICE — K-WIRE TRCR PT1.6MM DIA 150MM
Type: IMPLANTABLE DEVICE | Site: TIBIA | Status: NON-FUNCTIONAL
Removed: 2025-04-29

## (undated) DEVICE — ELECTRODE MEGADYNE RETURN DUAL

## (undated) DEVICE — CANISTER INFOV.A.C WOUND 500ML

## (undated) DEVICE — GOWN AERO CHROME W/ TOWEL XL

## (undated) DEVICE — BANDAGE ESMARK 6X12